# Patient Record
Sex: FEMALE | Race: WHITE | NOT HISPANIC OR LATINO | Employment: OTHER | URBAN - METROPOLITAN AREA
[De-identification: names, ages, dates, MRNs, and addresses within clinical notes are randomized per-mention and may not be internally consistent; named-entity substitution may affect disease eponyms.]

---

## 2017-02-21 ENCOUNTER — TELEPHONE (OUTPATIENT)
Dept: OBSTETRICS AND GYNECOLOGY | Age: 57
End: 2017-02-21

## 2017-02-21 NOTE — TELEPHONE ENCOUNTER
Pt states when she eats chocolate she gets knots under her skin, usually around her neck. Pt states about 10 days ago she ate a lot of chocolate and since then has had three pea sized knots near her vagina. Please advise.    Pt # [phone]

## 2017-02-22 NOTE — TELEPHONE ENCOUNTER
If she stops eating the chocolate these will go away, sounds like little sebaceous cysts.  Stop the chocolate, call if they haven't resolved in a month.

## 2017-11-15 DIAGNOSIS — Z78.0 MENOPAUSE: ICD-10-CM

## 2017-11-15 DIAGNOSIS — Z79.890 POST-MENOPAUSE ON HRT (HORMONE REPLACEMENT THERAPY): ICD-10-CM

## 2017-11-15 RX ORDER — ESTRADIOL 0.04 MG/D
FILM, EXTENDED RELEASE TRANSDERMAL
Qty: 8 PATCH | Refills: 3 | Status: SHIPPED | OUTPATIENT
Start: 2017-11-15 | End: 2017-12-05 | Stop reason: SDUPTHER

## 2017-12-04 ENCOUNTER — OFFICE VISIT (OUTPATIENT)
Dept: OBSTETRICS AND GYNECOLOGY | Age: 57
End: 2017-12-04

## 2017-12-04 VITALS
DIASTOLIC BLOOD PRESSURE: 94 MMHG | WEIGHT: 157 LBS | SYSTOLIC BLOOD PRESSURE: 140 MMHG | HEIGHT: 63 IN | BODY MASS INDEX: 27.82 KG/M2

## 2017-12-04 DIAGNOSIS — Z79.890 POST-MENOPAUSE ON HRT (HORMONE REPLACEMENT THERAPY): ICD-10-CM

## 2017-12-04 DIAGNOSIS — N39.46 MIXED STRESS AND URGE URINARY INCONTINENCE: ICD-10-CM

## 2017-12-04 DIAGNOSIS — Z01.419 WELL WOMAN EXAM WITH ROUTINE GYNECOLOGICAL EXAM: Primary | ICD-10-CM

## 2017-12-04 DIAGNOSIS — Z78.0 MENOPAUSE: ICD-10-CM

## 2017-12-04 LAB
BILIRUB BLD-MCNC: NEGATIVE MG/DL
GLUCOSE UR STRIP-MCNC: NEGATIVE MG/DL
KETONES UR QL: NEGATIVE
LEUKOCYTE EST, POC: NEGATIVE
NITRITE UR-MCNC: NEGATIVE MG/ML
PH UR: 6.5 [PH] (ref 5–8)
PROT UR STRIP-MCNC: NEGATIVE MG/DL
RBC # UR STRIP: ABNORMAL /UL
SP GR UR: 1.02 (ref 1–1.03)
UROBILINOGEN UR QL: NORMAL

## 2017-12-04 PROCEDURE — 81003 URINALYSIS AUTO W/O SCOPE: CPT | Performed by: OBSTETRICS & GYNECOLOGY

## 2017-12-04 PROCEDURE — 99396 PREV VISIT EST AGE 40-64: CPT | Performed by: OBSTETRICS & GYNECOLOGY

## 2017-12-04 NOTE — PROGRESS NOTES
Routine Annual Visit    2017    Patient: Marguerite Abarca          MR#:[id number]    Chief Complaint   Patient presents with   • Gynecologic Exam     Marguerite is here for her Annual Exam and Mammogram.  Her last pap smear was in  neg pap, neg HR-HPV.  Next pap .  She had a colonoscopy in , Dr. Booker.  Currently experiencing upper middle ab pain.  Also has UI.  No new surgeries.  Dr. Espinal discontinued relafen due to stomach pain.        57 y.o. female  who presents for annual exam.     HISTORY OF PRESENT ILLNESS:    GYN Complaints:  Urinary incontinence, both with cough and sneeze, lifting etc, but also spontaneously.     Other Complaints:  Stomach Pains, mid epigastric, better since stopping Relafen. Will see PCP tomorrow.    SCREENINGS:  =Family history of breast cancer: no       Perform regular self breast exam: yes - Several times per Month       Breast self-examination technique  reviewed and the patient encouraged to perform self breast exams monthly.   =Family history of ovarian cancer: no  =Family history of uterine cancer: no  =Family History of colon cancer: yes - Father, sister had colon polyps.    Mammogram: done today.  Colonoscopy: up to date.   Dr. Mcneal,   2016  Dr. Booker= No polyps.   DEXA: up to date. Followed by Dr Espinal.    LIFESTYLE:  =Diet: Not eating healthy.   = We discussed healthy lifestyle modifications.      =Exercise:  no. Active and lifts at work.   =I recommended 30 minutes of aerobic exercise 5 times a week.     =Calcium  no.But drinks 2 glasses of milk daily.  =Vitamin D:  yes -  50,000 units q week..   = We discussed calcium intake needs to prevent osteoporosis. 50,000 units q week.    GYN HISTORY:  1.  Menstrual Hx:, WILLIAM,BSO                 HRT:  yes - .0375 estradiol patch twice weekly.         Current contraception: post menopausal status    2.  History of abnormal Pap smear:  neg pap, neg HR-HPV. Next Pap .       Pap smear Hx: Never had an  "abnormal Pap smear.     NEW PAST MEDICAL HISTORY:    3.  New Medical Hx:  None.    4.  New Surgical Hx:  None.    5.  New Family Medical Hx:  None.    Review of Systems   Constitutional: Positive for fatigue.   HENT: Negative.    Eyes: Negative.    Respiratory: Positive for cough.    Cardiovascular:        Legs and feet toes hurt   Gastrointestinal:        HEARTBURN   Endocrine:        HAIR LOSS   Genitourinary: Positive for frequency.        URINARY INCONTINENCE     Musculoskeletal:        JOINT PAIN   Skin: Negative.    Allergic/Immunologic: Negative.    Neurological: Negative.    Hematological: Negative.    Psychiatric/Behavioral: Negative.        Objective     /94  Ht 160 cm (63\")  Wt 71.2 kg (157 lb)  Breastfeeding? No  BMI 27.81 kg/m2    PHYSICAL EXAM    Constitutional: Well-developed, well-nourished, overweight  female, in no distress, alert and well oriented ×3.    Skin is warm, dry, without rash.       Neck appears normal, trachea in the midline.  Thyroid exam normal.  No carotid bruits bilaterally.         No cervical lymphadenopathy.    Lungs clear to auscultation.  Chest wall appears normal.    Heart with regular rhythm without murmur or gallop.    Breasts:         Right breast nontender, without dominant mass.  No nipple discharge.            No superficial skin changes.  No axillary adenopathy.        Left breast nontender, without dominant mass.  No nipple discharge.            No superficial skin changes.  No axillary adenopathy.      Abdomen is flat, soft and nontender.No palpable mass.           No hepatosplenomegaly.  Flanks are negative.          Bowel sounds normal.  No abdominal bruit.          No inguinal lymphadenopathy bilaterally.     Pelvic exam :  Vulva normal.           External genitalia normal.  BUS negative.             Introitus normal.  Vaginal mucosa normal.  Well estrogenized.  Mild cystocele.           Vaginal cuff normal, uterus absent.  No Pap smear.          " Bimanual exam normal, no tenderness, no mass.  Adnexa absent.          Rectovaginal exam negative .      Musc /Skel: Lower extremities without edema.     Neuro: Coordination is grossly normal.  Gait is normal.    Psych: Mood and affect is normal.  Behavior normal.  Thought content normal.            Judgment normal.       =All questions were answered.      Assessment/Plan   Marguerite was seen today for gynecologic exam.    Diagnoses and all orders for this visit:    Well woman exam with routine gynecological exam  -     POC Urinalysis Dipstick, Automated    Menopause  -     estradiol (VIVELLE-DOT) 0.0375 MG/24HR; Place 1 patch on the skin 2 (Two) Times a Week.    Post-menopause on HRT (hormone replacement therapy)  -     estradiol (VIVELLE-DOT) 0.0375 MG/24HR; Place 1 patch on the skin 2 (Two) Times a Week.    Mixed stress and urge urinary incontinence  Comments:  Mostly stress urinary incontinence, also some degree of urgency incontinence.  Given Myrbetriq as a trial.  May need to see urogynecology.  Orders:  -     Mirabegron ER (MYRBETRIQ) 50 MG tablet sustained-release 24 hour 24 hr tablet; Take 50 mg by mouth Daily.      COMMENTS: Trial of Myrbetriq to see how much improvement that gives her bladder incontinence.  We'll probably need a TVT consult in the future.    Malik Lopez MD  12/5/2017

## 2017-12-05 PROBLEM — N39.46 MIXED STRESS AND URGE URINARY INCONTINENCE: Status: ACTIVE | Noted: 2017-12-05

## 2017-12-05 RX ORDER — ESTRADIOL 0.04 MG/D
1 FILM, EXTENDED RELEASE TRANSDERMAL 2 TIMES WEEKLY
Qty: 8 PATCH | Refills: 12 | Status: SHIPPED | OUTPATIENT
Start: 2017-12-07 | End: 2018-12-13 | Stop reason: SDUPTHER

## 2017-12-12 ENCOUNTER — TELEPHONE (OUTPATIENT)
Dept: OBSTETRICS AND GYNECOLOGY | Age: 57
End: 2017-12-12

## 2017-12-15 ENCOUNTER — OFFICE VISIT (OUTPATIENT)
Dept: GASTROENTEROLOGY | Facility: CLINIC | Age: 57
End: 2017-12-15

## 2017-12-15 VITALS
WEIGHT: 159.4 LBS | DIASTOLIC BLOOD PRESSURE: 98 MMHG | TEMPERATURE: 98.1 F | SYSTOLIC BLOOD PRESSURE: 142 MMHG | HEIGHT: 63 IN | BODY MASS INDEX: 28.24 KG/M2

## 2017-12-15 DIAGNOSIS — R10.10 PAIN OF UPPER ABDOMEN: Primary | ICD-10-CM

## 2017-12-15 PROCEDURE — 99204 OFFICE O/P NEW MOD 45 MIN: CPT | Performed by: INTERNAL MEDICINE

## 2017-12-15 NOTE — PROGRESS NOTES
Chief Complaint   Patient presents with   • Abdominal Pain       Marguerite Abarca is a 57 y.o. female who presents with Epigastric pain, on NSAIDs, 6 weeks    HPI Comments: Colonoscopy last year showed diverticulosis otherwise negative    Abdominal Pain   This is a new problem. The current episode started more than 1 month ago. The onset quality is gradual. The problem occurs daily. The problem has been waxing and waning. The pain is located in the RUQ, epigastric region and LUQ. The pain is at a severity of 5/10. The pain is moderate. The quality of the pain is colicky, burning and aching. The abdominal pain does not radiate. Associated symptoms include belching. Pertinent negatives include no anorexia, constipation, diarrhea, fever, flatus, frequency, headaches, hematochezia, melena, myalgias, nausea, vomiting or weight loss. The pain is aggravated by certain positions and movement. The pain is relieved by being still and certain positions. Treatments tried: She has stopped her NSAIDs. The treatment provided mild relief. There is no history of abdominal surgery, colon cancer, Crohn's disease, gallstones, GERD, irritable bowel syndrome, pancreatitis, PUD or ulcerative colitis.       Past Medical History:   Diagnosis Date   • Anal fissure    • Chronic pelvic pain in female 1999    Resolved with WILLIAM, BSO.   • Diverticulosis    • GERD (gastroesophageal reflux disease)    • Goiter    • Menopause    • Osteoporosis    • Postmenopausal HRT (hormone replacement therapy)    • Rheumatoid arthritis     Dr Chava Espinal; Methotrexate,  Stopped relafen 12/1/2017.   • Uterine fibroid 1999    Resolved with WILLIAM, BSO.   • Vasovagal syncope    • Vitamin D deficiency        Past Surgical History:   Procedure Laterality Date   • COLONOSCOPY N/A 8/25/2016    Diverticulosis in the sigmoid colon, The examination was otherwise normal on direct and retoflexion views   • CYST REMOVAL      ON WRIST-BENIGN   • DIAGNOSTIC LAPAROSCOPY  1983     Exploratory Laparoscopy with myomectomy (twice) and xautery of endometriosis. The ovaries were preserved.   • EXCISION TUMOR NECK / THORAX       Removal of Tumor from her back that was benign, Lumbo-Sacral area. Benign.   • MYOMECTOMY      Exploratory Laparoscopy with myomectomy (twice) and xautery of endometriosis. The ovaries were preserved.   • TOTAL ABDOMINAL HYSTERECTOMY Bilateral     Total Abdominal Hysterectomy with Removal of Both Ovaries. for cronic pelvic pain and adhesions.         Current Outpatient Prescriptions:   •  estradiol (VIVELLE-DOT) 0.0375 MG/24HR, Place 1 patch on the skin 2 (Two) Times a Week., Disp: 8 patch, Rfl: 12  •  folic acid (FOLVITE) 1 MG tablet, Take 1 mg by mouth daily., Disp: , Rfl:   •  methotrexate 2.5 MG tablet, Take 2.5 mg by mouth. 6 tablets taken all at once one day a week, Disp: , Rfl:   •  Mirabegron ER (MYRBETRIQ) 50 MG tablet sustained-release 24 hour 24 hr tablet, Take 50 mg by mouth Daily., Disp: 7 tablet, Rfl: 1  •  pantoprazole (PROTONIX) 40 MG EC tablet, Take 40 mg by mouth daily., Disp: , Rfl:   •  vitamin D (ERGOCALCIFEROL) 38048 UNITS capsule capsule, Take 50,000 Units by mouth 1 (one) time per week., Disp: , Rfl:     No Known Allergies    Social History     Social History   • Marital status:      Spouse name: LETITIA   • Number of children: 0   • Years of education: N/A     Occupational History   • Not on file.     Social History Main Topics   • Smoking status: Never Smoker   • Smokeless tobacco: Never Used   • Alcohol use 0.6 oz/week     1 Shots of liquor per week      Comment: rarely   • Drug use: No   • Sexual activity: Defer      Comment: spouse = LETITIA      Other Topics Concern   • Not on file     Social History Narrative       Family History   Problem Relation Age of Onset   • Valvular heart disease Father       of CHF.   • Colon cancer Father    • Colon polyps Sister    • COPD Mother    • No Known Problems Brother    • No Known Problems  Maternal Grandmother    • No Known Problems Maternal Grandfather    • No Known Problems Paternal Grandmother    • No Known Problems Paternal Grandfather    • No Known Problems Maternal Aunt    • No Known Problems Paternal Aunt    • Kidney disease Maternal Uncle    • BRCA 1/2 Neg Hx    • Breast cancer Neg Hx    • Endometrial cancer Neg Hx    • Ovarian cancer Neg Hx        Review of Systems   Constitutional: Negative for fever and weight loss.   Gastrointestinal: Positive for abdominal pain. Negative for anorexia, constipation, diarrhea, flatus, hematochezia, melena, nausea and vomiting.   Genitourinary: Negative for frequency.   Musculoskeletal: Negative for myalgias.   Neurological: Negative for headaches.   All other systems reviewed and are negative.      There were no vitals filed for this visit.    Physical Exam   Constitutional: She is oriented to person, place, and time. She appears well-developed and well-nourished.   HENT:   Head: Normocephalic and atraumatic.   Eyes: Pupils are equal, round, and reactive to light.   Cardiovascular: Normal rate, regular rhythm and normal heart sounds.    Pulmonary/Chest: Effort normal and breath sounds normal.   Abdominal: Soft. Bowel sounds are normal. She exhibits no shifting dullness, no distension, no pulsatile liver, no fluid wave, no abdominal bruit, no ascites, no pulsatile midline mass and no mass. There is no hepatosplenomegaly. There is tenderness. There is no rigidity and no guarding. No hernia.   She is tender in the epigastric region   Musculoskeletal: Normal range of motion.   Neurological: She is alert and oriented to person, place, and time.   Skin: Skin is warm and dry.   Psychiatric: She has a normal mood and affect. Her behavior is normal. Thought content normal.   Nursing note and vitals reviewed.      Problem list    Epigastric pain  Left upper quadrant pain  NSAID use      Assessment/Plan    I'm going to perform EGD to look for peptic ulcer disease, if  that is negative we'll move to CAT scan of the abdomen    If those tests are unrevealing we may perform a gallbladder workup

## 2017-12-30 ENCOUNTER — ANESTHESIA EVENT (OUTPATIENT)
Dept: GASTROENTEROLOGY | Facility: HOSPITAL | Age: 57
End: 2017-12-30

## 2017-12-30 ENCOUNTER — HOSPITAL ENCOUNTER (OUTPATIENT)
Facility: HOSPITAL | Age: 57
Setting detail: HOSPITAL OUTPATIENT SURGERY
Discharge: HOME | End: 2017-12-30
Attending: INTERNAL MEDICINE | Admitting: INTERNAL MEDICINE

## 2017-12-30 ENCOUNTER — ANESTHESIA (OUTPATIENT)
Dept: GASTROENTEROLOGY | Facility: HOSPITAL | Age: 57
End: 2017-12-30

## 2017-12-30 VITALS
OXYGEN SATURATION: 99 % | TEMPERATURE: 98.4 F | BODY MASS INDEX: 27.29 KG/M2 | HEIGHT: 63 IN | WEIGHT: 154 LBS | RESPIRATION RATE: 16 BRPM | SYSTOLIC BLOOD PRESSURE: 158 MMHG | DIASTOLIC BLOOD PRESSURE: 106 MMHG | HEART RATE: 66 BPM

## 2017-12-30 DIAGNOSIS — R10.10 PAIN OF UPPER ABDOMEN: ICD-10-CM

## 2017-12-30 PROCEDURE — 88312 SPECIAL STAINS GROUP 1: CPT | Performed by: INTERNAL MEDICINE

## 2017-12-30 PROCEDURE — 43239 EGD BIOPSY SINGLE/MULTIPLE: CPT | Performed by: INTERNAL MEDICINE

## 2017-12-30 PROCEDURE — 88305 TISSUE EXAM BY PATHOLOGIST: CPT | Performed by: INTERNAL MEDICINE

## 2017-12-30 PROCEDURE — 25010000002 PROPOFOL 10 MG/ML EMULSION: Performed by: ANESTHESIOLOGY

## 2017-12-30 RX ORDER — PROPOFOL 10 MG/ML
VIAL (ML) INTRAVENOUS CONTINUOUS PRN
Status: DISCONTINUED | OUTPATIENT
Start: 2017-12-30 | End: 2017-12-30 | Stop reason: SURG

## 2017-12-30 RX ORDER — SUCRALFATE 1 G/1
1 TABLET ORAL 2 TIMES DAILY
Qty: 60 TABLET | Refills: 0 | Status: SHIPPED | OUTPATIENT
Start: 2017-12-30 | End: 2018-02-01

## 2017-12-30 RX ORDER — PROPOFOL 10 MG/ML
VIAL (ML) INTRAVENOUS
Status: COMPLETED
Start: 2017-12-30 | End: 2017-12-30

## 2017-12-30 RX ORDER — LIDOCAINE HYDROCHLORIDE 20 MG/ML
INJECTION, SOLUTION INFILTRATION; PERINEURAL AS NEEDED
Status: DISCONTINUED | OUTPATIENT
Start: 2017-12-30 | End: 2017-12-30 | Stop reason: SURG

## 2017-12-30 RX ORDER — SODIUM CHLORIDE, SODIUM LACTATE, POTASSIUM CHLORIDE, CALCIUM CHLORIDE 600; 310; 30; 20 MG/100ML; MG/100ML; MG/100ML; MG/100ML
1000 INJECTION, SOLUTION INTRAVENOUS CONTINUOUS PRN
Status: DISCONTINUED | OUTPATIENT
Start: 2017-12-30 | End: 2017-12-30 | Stop reason: HOSPADM

## 2017-12-30 RX ORDER — PANTOPRAZOLE SODIUM 40 MG/1
40 TABLET, DELAYED RELEASE ORAL
Qty: 60 TABLET | Refills: 2 | Status: SHIPPED | OUTPATIENT
Start: 2017-12-30

## 2017-12-30 RX ADMIN — LIDOCAINE HYDROCHLORIDE 60 MG: 20 INJECTION, SOLUTION INFILTRATION; PERINEURAL at 10:54

## 2017-12-30 RX ADMIN — SODIUM CHLORIDE, POTASSIUM CHLORIDE, SODIUM LACTATE AND CALCIUM CHLORIDE 1000 ML: 600; 310; 30; 20 INJECTION, SOLUTION INTRAVENOUS at 09:33

## 2017-12-30 RX ADMIN — PROPOFOL 150 MCG/KG/MIN: 10 INJECTION, EMULSION INTRAVENOUS at 10:54

## 2017-12-30 RX ADMIN — SODIUM CHLORIDE, POTASSIUM CHLORIDE, SODIUM LACTATE AND CALCIUM CHLORIDE: 600; 310; 30; 20 INJECTION, SOLUTION INTRAVENOUS at 10:54

## 2017-12-30 NOTE — ANESTHESIA PREPROCEDURE EVALUATION
Anesthesia Evaluation     Patient summary reviewed and Nursing notes reviewed   no history of anesthetic complications:  NPO Solid Status: > 6 hours       Airway   Mallampati: II  TM distance: >3 FB  Neck ROM: full  no difficulty expected  Dental - normal exam     Pulmonary - normal exam   Cardiovascular - normal exam    (-) angina      Neuro/Psych  (+) syncope,     GI/Hepatic/Renal/Endo    (+)  GERD,     Musculoskeletal     Abdominal    Substance History      OB/GYN          Other                                                Anesthesia Plan    ASA 2     MAC     Anesthetic plan and risks discussed with patient.

## 2017-12-30 NOTE — ANESTHESIA POSTPROCEDURE EVALUATION
Patient: Marguerite Abarca    Procedure Summary     Date Anesthesia Start Anesthesia Stop Room / Location    12/30/17 1054 1109  [facility] /  [facility]       Procedure Diagnosis Surgeon Provider    ESOPHAGOGASTRODUODENOSCOPY with biopsy (N/A Esophagus) Pain of upper abdomen  (Pain of upper abdomen [R10.10]) MD Davey Stoddard MD          Anesthesia Type: MAC  Last vitals  BP   137/65 (12/30/17 1107)   Temp   36.9 °C (98.4 °F) (12/30/17 1107)   Pulse   67 (12/30/17 1107)   Resp   16 (12/30/17 1107)     SpO2   96 % (12/30/17 1107)     Post Anesthesia Care and Evaluation    Patient location during evaluation: PHASE II  Anesthetic complications: No anesthetic complications

## 2018-01-02 LAB
CYTO UR: NORMAL
LAB AP CASE REPORT: NORMAL
Lab: NORMAL
PATH REPORT.FINAL DX SPEC: NORMAL
PATH REPORT.GROSS SPEC: NORMAL

## 2018-01-03 ENCOUNTER — TELEPHONE (OUTPATIENT)
Dept: GASTROENTEROLOGY | Facility: CLINIC | Age: 58
End: 2018-01-03

## 2018-01-03 DIAGNOSIS — K29.00 ACUTE GASTRITIS WITHOUT HEMORRHAGE, UNSPECIFIED GASTRITIS TYPE: ICD-10-CM

## 2018-01-03 DIAGNOSIS — K21.9 GASTROESOPHAGEAL REFLUX DISEASE WITHOUT ESOPHAGITIS: Primary | ICD-10-CM

## 2018-01-03 NOTE — TELEPHONE ENCOUNTER
----- Message from Sherry Collins sent at 1/3/2018 11:04 AM EST -----  Regarding: PT CALLED - U/S ORDER  Contact: [phone]  PT STATED DR SILVEIRA WANT HER TO HAVE AN U/S and HIDA scan. PLEASE CHECK FOR ORDER AND CALL PT.  Patient called advised Dr. Rodriguez would like for her to have a Liver scan and a HIDA scan done. Advised Central Scheduling at the hospital will be calling her for an appointment time.

## 2018-01-12 ENCOUNTER — HOSPITAL ENCOUNTER (OUTPATIENT)
Dept: NUCLEAR MEDICINE | Facility: HOSPITAL | Age: 58
Discharge: HOME | End: 2018-01-12
Attending: INTERNAL MEDICINE

## 2018-01-12 ENCOUNTER — HOSPITAL ENCOUNTER (OUTPATIENT)
Dept: ULTRASOUND IMAGING | Facility: HOSPITAL | Age: 58
Discharge: HOME | End: 2018-01-12
Attending: INTERNAL MEDICINE | Admitting: INTERNAL MEDICINE

## 2018-01-12 DIAGNOSIS — K29.00 ACUTE GASTRITIS WITHOUT HEMORRHAGE, UNSPECIFIED GASTRITIS TYPE: ICD-10-CM

## 2018-01-12 DIAGNOSIS — K21.9 GASTROESOPHAGEAL REFLUX DISEASE WITHOUT ESOPHAGITIS: ICD-10-CM

## 2018-01-12 PROCEDURE — 76705 ECHO EXAM OF ABDOMEN: CPT

## 2018-01-12 PROCEDURE — A9537 TC99M MEBROFENIN: HCPCS | Performed by: INTERNAL MEDICINE

## 2018-01-12 PROCEDURE — 78227 HEPATOBIL SYST IMAGE W/DRUG: CPT

## 2018-01-12 PROCEDURE — 0 TECHNETIUM TC 99M MEBROFENIN KIT: Performed by: INTERNAL MEDICINE

## 2018-01-12 PROCEDURE — 25010000002 SINCALIDE PER 5 MCG: Performed by: INTERNAL MEDICINE

## 2018-01-12 RX ORDER — KIT FOR THE PREPARATION OF TECHNETIUM TC 99M MEBROFENIN 45 MG/10ML
1 INJECTION, POWDER, LYOPHILIZED, FOR SOLUTION INTRAVENOUS
Status: COMPLETED | OUTPATIENT
Start: 2018-01-12 | End: 2018-01-12

## 2018-01-12 RX ADMIN — SINCALIDE 1.4 MCG: 5 INJECTION, POWDER, LYOPHILIZED, FOR SOLUTION INTRAVENOUS at 08:30

## 2018-01-12 RX ADMIN — MEBROFENIN 1 DOSE: 45 INJECTION, POWDER, LYOPHILIZED, FOR SOLUTION INTRAVENOUS at 07:30

## 2018-01-15 ENCOUNTER — TELEPHONE (OUTPATIENT)
Dept: GASTROENTEROLOGY | Facility: CLINIC | Age: 58
End: 2018-01-15

## 2018-01-15 NOTE — TELEPHONE ENCOUNTER
----- Message from Ignacio Rodriguez MD sent at 1/15/2018  9:24 AM EST -----  Has gallbladder dyskinesia or chronic cholecystitis based on HIDA scan.  She will need to have her gallbladder out.  Would she like a referral to general surgery?  If so referred to KRISTYN Pardo.  If she wants to come talk to me about it in the office first I'm happy to do that this Friday afternoon anytime

## 2018-01-15 NOTE — PROGRESS NOTES
Has gallbladder dyskinesia or chronic cholecystitis based on HIDA scan.  She will need to have her gallbladder out.  Would she like a referral to general surgery?  If so referred to KRISTYN Pardo.  If she wants to come talk to me about it in the office first I'm happy to do that this Friday afternoon anytime

## 2018-01-15 NOTE — TELEPHONE ENCOUNTER
----- Message from Ignacio Rodriguez MD sent at 1/2/2018 12:45 PM EST -----  Pathology is benign, schedule a right upper quadrant ultrasound and HIDA scan with [facility]

## 2018-01-15 NOTE — TELEPHONE ENCOUNTER
Pt called back after Cuca had made appt for her to see Dr Rodriguez and called her with appt info. Apparently her sister has been a RN for 30 years and knows Dr Miranda well. She recommended Dr Miranda to pt. Pt called back and asked if referral could be sent to Dr Miranda instead of Dr Rodriguez since her sister knows Dr Miranda. Cuca stated she would cancel appt with Dr Rodriguez and place referral to Dr Miranda. Referral placed.

## 2018-01-15 NOTE — TELEPHONE ENCOUNTER
Called pt and advised that per Dr Rodriguez: the path results from her upper scope were all negative or normal. Advised that the HIDA scan showed that her gallbladder is not functioning as well as it should: her ejection fraction is only 13% when it should be 35% or greater. Advised that MD recommends to have her gallbladder removed. Advised that we can refer her to a surgeon, Dr Rupesh Rodriguez, if she would like or if she would like to come talk to Dr Rodriguez first, he can see her this Friday. Pt verb understanding and states OK to make referral to Dr Rodriguez.   Note given to scheduling to make referral.

## 2018-02-01 ENCOUNTER — OFFICE VISIT (OUTPATIENT)
Dept: SURGERY | Facility: CLINIC | Age: 58
End: 2018-02-01

## 2018-02-01 VITALS — HEART RATE: 78 BPM | BODY MASS INDEX: 27.46 KG/M2 | OXYGEN SATURATION: 98 % | WEIGHT: 155 LBS | HEIGHT: 63 IN

## 2018-02-01 DIAGNOSIS — K82.8 BILIARY DYSKINESIA: Primary | ICD-10-CM

## 2018-02-01 PROCEDURE — 99243 OFF/OP CNSLTJ NEW/EST LOW 30: CPT | Performed by: SURGERY

## 2018-02-03 NOTE — PROGRESS NOTES
SUMMARY (A/P):    57-year-old lady with typical symptoms of biliary colic, family history of gallbladder disease, and abnormal HIDA scan.  Understanding her options she wishes to proceed with laparoscopic cholecystectomy.  She understands the rationale for the procedure, the nature of the procedure, and the risks including but not limited to bleeding, infection, conversion to open procedure, postoperative bile leak, and the bowel function changes which can accompany cholecystectomy.      CC:  Referred for consultation regarding abdominal pain by Cristiane LOUIE    HPI:  57-year-old lady presents with epigastric and right upper quadrant abdominal pain radiating to the flank.  Intermittent and moderately severe in nature.  Duration of symptoms is approximately 2 years and they seem to be worsening.  Associated with nausea.    PHYSICAL EXAM:   Constitutional: Well-developed well-nourished, no acute distress   Heart rate 78   Weight (pounds) 155   BMI 27.5   Height (inches) 63  Eyes: Conjunctiva normal, sclera nonicteric  ENMT: Hearing grossly normal, oral mucosa moist  Neck: Supple, no palpable mass, normal thyroid, trachea midline  Respiratory: Clear to auscultation, normal inspiratory effort  Cardiovascular: Regular rate, no murmur, no carotid bruit, no peripheral edema, no jugular venous distention  Gastrointestinal: Soft, nontender, no palpable mass, no hepatosplenomegaly, negative for hernia, bowel sounds normal  Lymphatics (palpable nodes):  cervical-negative, axillary-negative  Skin:  Warm, dry, no rash on visualized skin surfaces  Musculoskeletal: Symmetric strength, normal gait  Psychiatric: Alert and oriented ×3, normal affect     ALLERGIES: reviewed, in Epic    MEDICATIONS: reviewed, in Epic    PMH:    Gastroesophageal reflux disease  Rheumatoid arthritis  Osteoporosis    PSH:    Diagnostic laparoscopy 1983  Myomectomy 1995  Colonoscopy August 2016-diverticulosis  Total abdominal hysterectomy  1999  Hemorrhoidectomy    FAMILY HISTORY:    Gallbladder disease in her sister    SOCIAL HISTORY:   Denies tobacco use  Occasional alcohol use    ROS:  No chest pain or shortness of air.  Negative for unexpected weight loss.  All other systems reviewed and negative other than presenting complaints.    RADIOLOGY/ENDOSCOPY:    -HIDA scan with Kinevac stimulation 1/3/2018 13% ejection fraction  -Ultrasound gallbladder 1/3/2018 unremarkable  EGD 12/30/2017 mild gastritis, otherwise unremarkable    ARIE LARRY M.D.

## 2018-02-09 ENCOUNTER — APPOINTMENT (OUTPATIENT)
Dept: PREADMISSION TESTING | Facility: HOSPITAL | Age: 58
End: 2018-02-09

## 2018-02-09 VITALS
HEART RATE: 78 BPM | WEIGHT: 159.6 LBS | OXYGEN SATURATION: 100 % | TEMPERATURE: 98 F | SYSTOLIC BLOOD PRESSURE: 160 MMHG | HEIGHT: 63 IN | DIASTOLIC BLOOD PRESSURE: 89 MMHG | RESPIRATION RATE: 16 BRPM | BODY MASS INDEX: 28.28 KG/M2

## 2018-02-09 LAB
ANION GAP SERPL CALCULATED.3IONS-SCNC: 8.7 MMOL/L
BASOPHILS # BLD AUTO: 0.04 10*3/MM3 (ref 0–0.2)
BASOPHILS NFR BLD AUTO: 0.8 % (ref 0–1.5)
BUN BLD-MCNC: 13 MG/DL (ref 6–20)
BUN/CREAT SERPL: 18.3 (ref 7–25)
CALCIUM SPEC-SCNC: 9.1 MG/DL (ref 8.6–10.5)
CHLORIDE SERPL-SCNC: 105 MMOL/L (ref 98–107)
CO2 SERPL-SCNC: 28.3 MMOL/L (ref 22–29)
CREAT BLD-MCNC: 0.71 MG/DL (ref 0.57–1)
DEPRECATED RDW RBC AUTO: 48.7 FL (ref 37–54)
EOSINOPHIL # BLD AUTO: 0.18 10*3/MM3 (ref 0–0.7)
EOSINOPHIL NFR BLD AUTO: 3.8 % (ref 0.3–6.2)
ERYTHROCYTE [DISTWIDTH] IN BLOOD BY AUTOMATED COUNT: 13.4 % (ref 11.7–13)
GFR SERPL CREATININE-BSD FRML MDRD: 84 ML/MIN/1.73
GLUCOSE BLD-MCNC: 88 MG/DL (ref 65–99)
HCT VFR BLD AUTO: 40.2 % (ref 35.6–45.5)
HGB BLD-MCNC: 12.4 G/DL (ref 11.9–15.5)
IMM GRANULOCYTES # BLD: 0 10*3/MM3 (ref 0–0.03)
IMM GRANULOCYTES NFR BLD: 0 % (ref 0–0.5)
LYMPHOCYTES # BLD AUTO: 1.17 10*3/MM3 (ref 0.9–4.8)
LYMPHOCYTES NFR BLD AUTO: 24.7 % (ref 19.6–45.3)
MCH RBC QN AUTO: 30.8 PG (ref 26.9–32)
MCHC RBC AUTO-ENTMCNC: 30.8 G/DL (ref 32.4–36.3)
MCV RBC AUTO: 99.8 FL (ref 80.5–98.2)
MONOCYTES # BLD AUTO: 0.45 10*3/MM3 (ref 0.2–1.2)
MONOCYTES NFR BLD AUTO: 9.5 % (ref 5–12)
NEUTROPHILS # BLD AUTO: 2.9 10*3/MM3 (ref 1.9–8.1)
NEUTROPHILS NFR BLD AUTO: 61.2 % (ref 42.7–76)
PLATELET # BLD AUTO: 237 10*3/MM3 (ref 140–500)
PMV BLD AUTO: 9.5 FL (ref 6–12)
POTASSIUM BLD-SCNC: 4.1 MMOL/L (ref 3.5–5.2)
RBC # BLD AUTO: 4.03 10*6/MM3 (ref 3.9–5.2)
SODIUM BLD-SCNC: 142 MMOL/L (ref 136–145)
WBC NRBC COR # BLD: 4.74 10*3/MM3 (ref 4.5–10.7)

## 2018-02-09 PROCEDURE — 36415 COLL VENOUS BLD VENIPUNCTURE: CPT

## 2018-02-09 PROCEDURE — 85025 COMPLETE CBC W/AUTO DIFF WBC: CPT | Performed by: SURGERY

## 2018-02-09 PROCEDURE — 80048 BASIC METABOLIC PNL TOTAL CA: CPT | Performed by: SURGERY

## 2018-02-09 NOTE — DISCHARGE INSTRUCTIONS
Take the following medications the morning of surgery with a small sip of water: PROTONIX    ARRIVAL TIME  08:30        General Instructions:  • Do not eat solid food after midnight the night before surgery.  • You may drink clear liquids day of surgery but must stop at least one hour before your hospital arrival time.  • It is beneficial for you to have a clear drink that contains carbohydrates the day of surgery.  We suggest a 12 to 20 ounce bottle of Gatorade or Powerade for non-diabetic patients or a 12 to 20 ounce bottle of G2 or Powerade Zero for diabetic patients. (Pediatric patients, are not advised to drink a 12 to 20 ounce carbohydrate drink)    Clear liquids are liquids you can see through.  Nothing red in color.     Plain water                               Sports drinks  Sodas                                   Gelatin (Jell-O)  Fruit juices without pulp such as white grape juice and apple juice  Popsicles that contain no fruit or yogurt  Tea or coffee (no cream or milk added)  Gatorade / Powerade  G2 / Powerade Zero    .   • Bring any papers given to you in the doctor’s office.  • Wear clean comfortable clothes and socks.  • Do not wear contact lenses or make-up.  Bring a case for your glasses.   • Remove all piercings.  Leave jewelry and any other valuables at home.  • The Pre-Admission Testing nurse will instruct you to bring medications if unable to obtain an accurate list in Pre-Admission Testing.            Preventing a Surgical Site Infection:  • For 2 to 3 days before surgery, avoid shaving with a razor because the razor can irritate skin and make it easier to develop an infection.  • The night prior to surgery sleep in a clean bed with clean clothing.  Do not allow pets to sleep with you.  • Shower on the morning of surgery using a fresh bar of anti-bacterial soap (such as Dial) and clean washcloth.  Dry with a clean towel and dress in clean clothing.  • Ask your surgeon if you will be receiving  antibiotics prior to surgery.  • Make sure you, your family, and all healthcare providers clean their hands with soap and water or an alcohol based hand  before caring for you or your wound.    Day of surgery:  Upon arrival, a Pre-op nurse and Anesthesiologist will review your health history, obtain vital signs, and answer questions you may have.  The only belongings needed at this time will be your home medications and if applicable your C-PAP/BI-PAP machine.  If you are staying overnight your family can leave the rest of your belongings in the car and bring them to your room later.  A Pre-op nurse will start an IV and you may receive medication in preparation for surgery, including something to help you relax.  Your family will be able to see you in the Pre-op area.  While you are in surgery your family should notify the waiting room  if they leave the waiting room area and provide a contact phone number.    Please be aware that surgery does come with discomfort.  We want to make every effort to control your discomfort so please discuss any uncontrolled symptoms with your nurse.   Your doctor will most likely have prescribed pain medications.      If you are going home after surgery you will receive individualized written care instructions before being discharged.  A responsible adult must drive you to and from the hospital on the day of your surgery and stay with you for 24 hours.    If you are staying overnight following surgery, you will be transported to your hospital room following the recovery period.  [facility] has all private rooms.    If you have any questions please call Pre-Admission Testing at [phone].  Deductibles and co-payments are collected on the day of service. Please be prepared to pay the required co-pay, deductible or deposit on the day of service as defined by your plan.

## 2018-02-16 ENCOUNTER — HOSPITAL ENCOUNTER (OUTPATIENT)
Facility: HOSPITAL | Age: 58
Setting detail: HOSPITAL OUTPATIENT SURGERY
Discharge: HOME | End: 2018-02-16
Attending: SURGERY | Admitting: SURGERY

## 2018-02-16 ENCOUNTER — ANESTHESIA (OUTPATIENT)
Dept: PERIOP | Facility: HOSPITAL | Age: 58
End: 2018-02-16

## 2018-02-16 ENCOUNTER — ANESTHESIA EVENT (OUTPATIENT)
Dept: PERIOP | Facility: HOSPITAL | Age: 58
End: 2018-02-16

## 2018-02-16 VITALS
SYSTOLIC BLOOD PRESSURE: 132 MMHG | HEART RATE: 60 BPM | RESPIRATION RATE: 16 BRPM | OXYGEN SATURATION: 98 % | TEMPERATURE: 98.8 F | DIASTOLIC BLOOD PRESSURE: 84 MMHG

## 2018-02-16 DIAGNOSIS — K82.8 BILIARY DYSKINESIA: ICD-10-CM

## 2018-02-16 PROCEDURE — 25010000002 HYDROMORPHONE PER 4 MG: Performed by: ANESTHESIOLOGY

## 2018-02-16 PROCEDURE — 47562 LAPAROSCOPIC CHOLECYSTECTOMY: CPT | Performed by: PHYSICIAN ASSISTANT

## 2018-02-16 PROCEDURE — 88304 TISSUE EXAM BY PATHOLOGIST: CPT | Performed by: SURGERY

## 2018-02-16 PROCEDURE — 25010000002 KETOROLAC TROMETHAMINE PER 15 MG: Performed by: NURSE ANESTHETIST, CERTIFIED REGISTERED

## 2018-02-16 PROCEDURE — 25010000002 FENTANYL CITRATE (PF) 100 MCG/2ML SOLUTION: Performed by: ANESTHESIOLOGY

## 2018-02-16 PROCEDURE — 25010000002 PROPOFOL 10 MG/ML EMULSION: Performed by: NURSE ANESTHETIST, CERTIFIED REGISTERED

## 2018-02-16 PROCEDURE — 0 IOTHALAMATE 60 % SOLUTION: Performed by: SURGERY

## 2018-02-16 PROCEDURE — 25010000002 DEXAMETHASONE PER 1 MG: Performed by: NURSE ANESTHETIST, CERTIFIED REGISTERED

## 2018-02-16 PROCEDURE — 25010000002 PROMETHAZINE PER 50 MG: Performed by: ANESTHESIOLOGY

## 2018-02-16 PROCEDURE — 25010000002 MIDAZOLAM PER 1 MG: Performed by: ANESTHESIOLOGY

## 2018-02-16 PROCEDURE — 25010000002 FENTANYL CITRATE (PF) 100 MCG/2ML SOLUTION: Performed by: NURSE ANESTHETIST, CERTIFIED REGISTERED

## 2018-02-16 PROCEDURE — 47562 LAPAROSCOPIC CHOLECYSTECTOMY: CPT | Performed by: SURGERY

## 2018-02-16 PROCEDURE — 25010000002 ONDANSETRON PER 1 MG: Performed by: NURSE ANESTHETIST, CERTIFIED REGISTERED

## 2018-02-16 RX ORDER — ONDANSETRON 2 MG/ML
INJECTION INTRAMUSCULAR; INTRAVENOUS AS NEEDED
Status: DISCONTINUED | OUTPATIENT
Start: 2018-02-16 | End: 2018-02-16 | Stop reason: SURG

## 2018-02-16 RX ORDER — ONDANSETRON 4 MG/1
4 TABLET, FILM COATED ORAL EVERY 6 HOURS PRN
Qty: 10 TABLET | Refills: 1 | Status: SHIPPED | OUTPATIENT
Start: 2018-02-16 | End: 2018-04-19 | Stop reason: ALTCHOICE

## 2018-02-16 RX ORDER — LIDOCAINE HYDROCHLORIDE 20 MG/ML
INJECTION, SOLUTION INFILTRATION; PERINEURAL AS NEEDED
Status: DISCONTINUED | OUTPATIENT
Start: 2018-02-16 | End: 2018-02-16 | Stop reason: SURG

## 2018-02-16 RX ORDER — LIDOCAINE HYDROCHLORIDE 10 MG/ML
0.5 INJECTION, SOLUTION EPIDURAL; INFILTRATION; INTRACAUDAL; PERINEURAL ONCE AS NEEDED
Status: DISCONTINUED | OUTPATIENT
Start: 2018-02-16 | End: 2018-02-16 | Stop reason: HOSPADM

## 2018-02-16 RX ORDER — KETOROLAC TROMETHAMINE 30 MG/ML
INJECTION, SOLUTION INTRAMUSCULAR; INTRAVENOUS AS NEEDED
Status: DISCONTINUED | OUTPATIENT
Start: 2018-02-16 | End: 2018-02-16 | Stop reason: SURG

## 2018-02-16 RX ORDER — HYDRALAZINE HYDROCHLORIDE 20 MG/ML
5 INJECTION INTRAMUSCULAR; INTRAVENOUS
Status: DISCONTINUED | OUTPATIENT
Start: 2018-02-16 | End: 2018-02-16 | Stop reason: HOSPADM

## 2018-02-16 RX ORDER — PROMETHAZINE HYDROCHLORIDE 25 MG/1
25 TABLET ORAL ONCE AS NEEDED
Status: COMPLETED | OUTPATIENT
Start: 2018-02-16 | End: 2018-02-16

## 2018-02-16 RX ORDER — ACETAMINOPHEN 325 MG/1
650 TABLET ORAL ONCE
Status: COMPLETED | OUTPATIENT
Start: 2018-02-16 | End: 2018-02-16

## 2018-02-16 RX ORDER — PROMETHAZINE HYDROCHLORIDE 25 MG/ML
6.25 INJECTION, SOLUTION INTRAMUSCULAR; INTRAVENOUS ONCE AS NEEDED
Status: COMPLETED | OUTPATIENT
Start: 2018-02-16 | End: 2018-02-16

## 2018-02-16 RX ORDER — PROMETHAZINE HYDROCHLORIDE 25 MG/1
25 SUPPOSITORY RECTAL ONCE AS NEEDED
Status: COMPLETED | OUTPATIENT
Start: 2018-02-16 | End: 2018-02-16

## 2018-02-16 RX ORDER — OXYCODONE HYDROCHLORIDE AND ACETAMINOPHEN 5; 325 MG/1; MG/1
1 TABLET ORAL ONCE AS NEEDED
Status: DISCONTINUED | OUTPATIENT
Start: 2018-02-16 | End: 2018-02-16 | Stop reason: HOSPADM

## 2018-02-16 RX ORDER — FAMOTIDINE 10 MG/ML
20 INJECTION, SOLUTION INTRAVENOUS ONCE
Status: COMPLETED | OUTPATIENT
Start: 2018-02-16 | End: 2018-02-16

## 2018-02-16 RX ORDER — DIPHENHYDRAMINE HYDROCHLORIDE 50 MG/ML
12.5 INJECTION INTRAMUSCULAR; INTRAVENOUS
Status: DISCONTINUED | OUTPATIENT
Start: 2018-02-16 | End: 2018-02-16 | Stop reason: HOSPADM

## 2018-02-16 RX ORDER — PROPOFOL 10 MG/ML
VIAL (ML) INTRAVENOUS AS NEEDED
Status: DISCONTINUED | OUTPATIENT
Start: 2018-02-16 | End: 2018-02-16 | Stop reason: SURG

## 2018-02-16 RX ORDER — ROCURONIUM BROMIDE 10 MG/ML
INJECTION, SOLUTION INTRAVENOUS AS NEEDED
Status: DISCONTINUED | OUTPATIENT
Start: 2018-02-16 | End: 2018-02-16 | Stop reason: SURG

## 2018-02-16 RX ORDER — NALOXONE HCL 0.4 MG/ML
0.4 VIAL (ML) INJECTION AS NEEDED
Status: DISCONTINUED | OUTPATIENT
Start: 2018-02-16 | End: 2018-02-16 | Stop reason: HOSPADM

## 2018-02-16 RX ORDER — NALBUPHINE HCL 10 MG/ML
10 AMPUL (ML) INJECTION EVERY 4 HOURS PRN
Status: DISCONTINUED | OUTPATIENT
Start: 2018-02-16 | End: 2018-02-16 | Stop reason: HOSPADM

## 2018-02-16 RX ORDER — FENTANYL CITRATE 50 UG/ML
INJECTION, SOLUTION INTRAMUSCULAR; INTRAVENOUS AS NEEDED
Status: DISCONTINUED | OUTPATIENT
Start: 2018-02-16 | End: 2018-02-16 | Stop reason: SURG

## 2018-02-16 RX ORDER — MIDAZOLAM HYDROCHLORIDE 1 MG/ML
1 INJECTION INTRAMUSCULAR; INTRAVENOUS
Status: DISCONTINUED | OUTPATIENT
Start: 2018-02-16 | End: 2018-02-16 | Stop reason: HOSPADM

## 2018-02-16 RX ORDER — BUPIVACAINE HYDROCHLORIDE AND EPINEPHRINE 5; 5 MG/ML; UG/ML
INJECTION, SOLUTION PERINEURAL AS NEEDED
Status: DISCONTINUED | OUTPATIENT
Start: 2018-02-16 | End: 2018-02-16 | Stop reason: HOSPADM

## 2018-02-16 RX ORDER — FENTANYL CITRATE 50 UG/ML
50 INJECTION, SOLUTION INTRAMUSCULAR; INTRAVENOUS
Status: DISCONTINUED | OUTPATIENT
Start: 2018-02-16 | End: 2018-02-16 | Stop reason: HOSPADM

## 2018-02-16 RX ORDER — SODIUM CHLORIDE, SODIUM LACTATE, POTASSIUM CHLORIDE, CALCIUM CHLORIDE 600; 310; 30; 20 MG/100ML; MG/100ML; MG/100ML; MG/100ML
9 INJECTION, SOLUTION INTRAVENOUS CONTINUOUS
Status: DISCONTINUED | OUTPATIENT
Start: 2018-02-16 | End: 2018-02-16 | Stop reason: HOSPADM

## 2018-02-16 RX ORDER — MIDAZOLAM HYDROCHLORIDE 1 MG/ML
2 INJECTION INTRAMUSCULAR; INTRAVENOUS
Status: DISCONTINUED | OUTPATIENT
Start: 2018-02-16 | End: 2018-02-16 | Stop reason: HOSPADM

## 2018-02-16 RX ORDER — SODIUM CHLORIDE 0.9 % (FLUSH) 0.9 %
1-10 SYRINGE (ML) INJECTION AS NEEDED
Status: DISCONTINUED | OUTPATIENT
Start: 2018-02-16 | End: 2018-02-16 | Stop reason: HOSPADM

## 2018-02-16 RX ORDER — ACETAMINOPHEN 650 MG/1
650 SUPPOSITORY RECTAL ONCE AS NEEDED
Status: DISCONTINUED | OUTPATIENT
Start: 2018-02-16 | End: 2018-02-16 | Stop reason: HOSPADM

## 2018-02-16 RX ORDER — HYDROCODONE BITARTRATE AND ACETAMINOPHEN 5; 325 MG/1; MG/1
TABLET ORAL
Qty: 30 TABLET | Refills: 0 | Status: SHIPPED | OUTPATIENT
Start: 2018-02-16 | End: 2018-04-19 | Stop reason: ALTCHOICE

## 2018-02-16 RX ORDER — SODIUM CHLORIDE 9 MG/ML
INJECTION, SOLUTION INTRAVENOUS AS NEEDED
Status: DISCONTINUED | OUTPATIENT
Start: 2018-02-16 | End: 2018-02-16 | Stop reason: HOSPADM

## 2018-02-16 RX ORDER — NALBUPHINE HCL 10 MG/ML
2 AMPUL (ML) INJECTION EVERY 4 HOURS PRN
Status: DISCONTINUED | OUTPATIENT
Start: 2018-02-16 | End: 2018-02-16 | Stop reason: HOSPADM

## 2018-02-16 RX ORDER — DEXAMETHASONE SODIUM PHOSPHATE 10 MG/ML
INJECTION INTRAMUSCULAR; INTRAVENOUS AS NEEDED
Status: DISCONTINUED | OUTPATIENT
Start: 2018-02-16 | End: 2018-02-16 | Stop reason: SURG

## 2018-02-16 RX ORDER — ACETAMINOPHEN 325 MG/1
650 TABLET ORAL ONCE AS NEEDED
Status: DISCONTINUED | OUTPATIENT
Start: 2018-02-16 | End: 2018-02-16 | Stop reason: HOSPADM

## 2018-02-16 RX ADMIN — ONDANSETRON 4 MG: 2 INJECTION INTRAMUSCULAR; INTRAVENOUS at 10:38

## 2018-02-16 RX ADMIN — HYDROMORPHONE HYDROCHLORIDE 0.5 MG: 2 INJECTION INTRAMUSCULAR; INTRAVENOUS; SUBCUTANEOUS at 11:43

## 2018-02-16 RX ADMIN — ACETAMINOPHEN 650 MG: 325 TABLET, FILM COATED ORAL at 09:13

## 2018-02-16 RX ADMIN — PROPOFOL 150 MG: 10 INJECTION, EMULSION INTRAVENOUS at 10:15

## 2018-02-16 RX ADMIN — MIDAZOLAM 2 MG: 1 INJECTION INTRAMUSCULAR; INTRAVENOUS at 09:13

## 2018-02-16 RX ADMIN — FENTANYL CITRATE 50 MCG: 50 INJECTION INTRAMUSCULAR; INTRAVENOUS at 10:27

## 2018-02-16 RX ADMIN — PROPOFOL 50 MG: 10 INJECTION, EMULSION INTRAVENOUS at 10:27

## 2018-02-16 RX ADMIN — FAMOTIDINE 20 MG: 10 INJECTION, SOLUTION INTRAVENOUS at 09:13

## 2018-02-16 RX ADMIN — MIDAZOLAM 2 MG: 1 INJECTION INTRAMUSCULAR; INTRAVENOUS at 10:07

## 2018-02-16 RX ADMIN — KETOROLAC TROMETHAMINE 30 MG: 30 INJECTION, SOLUTION INTRAMUSCULAR; INTRAVENOUS at 10:38

## 2018-02-16 RX ADMIN — PROMETHAZINE HYDROCHLORIDE 6.25 MG: 25 INJECTION INTRAMUSCULAR; INTRAVENOUS at 11:35

## 2018-02-16 RX ADMIN — ROCURONIUM BROMIDE 30 MG: 10 INJECTION INTRAVENOUS at 10:15

## 2018-02-16 RX ADMIN — FENTANYL CITRATE 50 MCG: 50 INJECTION, SOLUTION INTRAMUSCULAR; INTRAVENOUS at 11:17

## 2018-02-16 RX ADMIN — FENTANYL CITRATE 50 MCG: 50 INJECTION INTRAMUSCULAR; INTRAVENOUS at 10:15

## 2018-02-16 RX ADMIN — FENTANYL CITRATE 50 MCG: 50 INJECTION, SOLUTION INTRAMUSCULAR; INTRAVENOUS at 11:11

## 2018-02-16 RX ADMIN — SODIUM CHLORIDE, POTASSIUM CHLORIDE, SODIUM LACTATE AND CALCIUM CHLORIDE: 600; 310; 30; 20 INJECTION, SOLUTION INTRAVENOUS at 10:40

## 2018-02-16 RX ADMIN — HYDROMORPHONE HYDROCHLORIDE 0.5 MG: 2 INJECTION INTRAMUSCULAR; INTRAVENOUS; SUBCUTANEOUS at 11:27

## 2018-02-16 RX ADMIN — DEXAMETHASONE SODIUM PHOSPHATE 4 MG: 10 INJECTION INTRAMUSCULAR; INTRAVENOUS at 10:38

## 2018-02-16 RX ADMIN — LIDOCAINE HYDROCHLORIDE 100 MG: 20 INJECTION, SOLUTION INFILTRATION; PERINEURAL at 10:15

## 2018-02-16 RX ADMIN — SODIUM CHLORIDE, POTASSIUM CHLORIDE, SODIUM LACTATE AND CALCIUM CHLORIDE 9 ML/HR: 600; 310; 30; 20 INJECTION, SOLUTION INTRAVENOUS at 11:06

## 2018-02-16 RX ADMIN — SODIUM CHLORIDE, POTASSIUM CHLORIDE, SODIUM LACTATE AND CALCIUM CHLORIDE: 600; 310; 30; 20 INJECTION, SOLUTION INTRAVENOUS at 10:13

## 2018-02-16 NOTE — ANESTHESIA PROCEDURE NOTES
Airway  Urgency: elective    Airway not difficult    General Information and Staff    Patient location during procedure: OR  Anesthesiologist: RENDER, LOPEZ RAY  CRNA: RUSS DIXON    Indications and Patient Condition  Indications for airway management: airway protection    Preoxygenated: yes  MILS not maintained throughout  Mask difficulty assessment: 1 - vent by mask    Final Airway Details  Final airway type: endotracheal airway      Successful airway: ETT  Cuffed: yes   Successful intubation technique: direct laryngoscopy  Facilitating devices/methods: intubating stylet  Endotracheal tube insertion site: oral  Blade: Amezcua  Blade size: #2  ETT size: 7.0 mm  Cormack-Lehane Classification: grade I - full view of glottis  Placement verified by: chest auscultation and capnometry   Cuff volume (mL): 8  Measured from: lips  Number of attempts at approach: 1    Additional Comments  Ett placed easily, appears atraumatic, dentition intact

## 2018-02-16 NOTE — ANESTHESIA POSTPROCEDURE EVALUATION
Patient: Marguerite Abarca    Procedure Summary     Date Anesthesia Start Anesthesia Stop Room / Location    02/16/18 1013 1050  [facility] /  [facility]       Procedure Diagnosis Surgeon Provider    CHOLECYSTECTOMY LAPAROSCOPIC  (N/A Abdomen) Biliary dyskinesia  (Biliary dyskinesia [K82.8]) MD Chicho Mike MD          Anesthesia Type: general  Last vitals  BP   146/79 (02/16/18 1245)   Temp   36.8 °C (98.3 °F) (02/16/18 1049)   Pulse   59 (02/16/18 1245)   Resp   16 (02/16/18 1245)     SpO2   99 % (02/16/18 1245)     Post Anesthesia Care and Evaluation    Patient location during evaluation: bedside  Patient participation: complete - patient participated  Level of consciousness: awake  Pain score: 1  Pain management: adequate  Airway patency: patent  Anesthetic complications: No anesthetic complications    Cardiovascular status: acceptable  Respiratory status: acceptable  Hydration status: acceptable    Comments: --------------------            02/16/18               1245     --------------------   BP:       146/79     Pulse:      59       Resp:       16       Temp:                SpO2:      99%      --------------------

## 2018-02-16 NOTE — OP NOTE
PREOPERATIVE DIAGNOSIS:  Biliary dyskinesia    POSTOPERATIVE DIAGNOSIS (FINDINGS):  Same    PROCEDURE:  Laparoscopic cholecystectomy    SURGEON:  Silvano Rushing MD    ASSISTANT:  Tamica Kaufman    ANESTHESIA:  General    EBL:  Minimal    SPECIMEN(S):  Gallbladder    DESCRIPTION:  Supine position. General anesthesia. Prepped and draped, usual sterile manner.    1/2 % marcaine with epinephrine infiltrated in all incision sites. Small periumbilical incision made, Veress needle inserted with upward traction on abdominal wall. Abdomen insufflated to 15 mm Hg pressure and 5 mm opti-view trocar inserted followed by 10 mm subxiphoid and 5 mm right subcostal trocars.    Gallbladder grasped and elevated. Cystic duct dissected and clipped twice proximally and once distally and divided.  Cystic artery clipped twice proximally, once distally and divided. Gallbladder removed from the liver bed with electrocautery and removed through subxiphoid trocar site. Liver bed copiously irrigated and aspirated with good hemostasis noted. CO2 released, fascia of subxiphoid trocar site closed with 0-vicryl, skin edges 5-0 vicryl subcuticular suture.    Tolerated well.  Stable to PACU.    Silvano Rushing M.D.

## 2018-02-16 NOTE — DISCHARGE INSTRUCTIONS
Dr. Silvano Rushing  [address]  [place] [id number] [phone]    Discharge Instructions for Gall Bladder Surgery      1. Go home, rest and take it easy today; however, you should get up and move about several times today to reduce the risk of developing a clot in your legs.      2. You may experience some dizziness or memory loss from the anesthesia.  This may last for the next 24 hours.  Someone should plan on staying with you for the first 24 hours for your safety.    3. Do not make any important legal decisions or sign any legal papers for the next 24 hours.      4. Eat and drink lightly today.  Start off with liquids, jello, soup, crackers or other bland foods at first. You may advance your diet tomorrow as tolerated as long as you do not experience any nausea or vomiting.     5. If skin glue (Dermabond) was used, your incisions are protected and covered.  The invisible glue will dissolve on its own as your incision heals. If you have dressings, you may remove your outer dressings in 3 days.  The white tapes called steri-strips should stay in place.  They will fall off on their own in 1-2 weeks.  Do not worry if they come off sooner.      6. If you have dressings, you may notice some bleeding/drainage on your outer dressings. A little bloody drainage is normal. If the bleeding/drainage is such that the bandage cannot absorb it, remove the dressing, apply clean gauze and apply firm pressure for a full 15 minutes.  If the bleeding continues, please call me.    7. You may shower tomorrow allowing water to run over the incisions; however, do not scrub the incisions.   No tub baths until your incisions are completely healed.         8. You have received a prescription for a narcotic pain medicine, as you will have some pain following surgery.   You will not be totally pain free, but your pain medicine should make the pain tolerable.  Please take your pain medicine as prescribed and always take  your pills with food to prevent nausea. If you are having severe pain that cannot be controlled by the pain medicine, please contact me.      9. You have also received a prescription for an anti-nausea medicine.  Please take this as prescribed for any nausea or vomiting.  Nausea could be a result of the anesthesia or a result of the narcotic pain medicine.  If you experience severe nausea and vomiting that cannot be controlled by the nausea medicine, please call me.            10. It is not unusual to experience pain/discomfort in your shoulders or under your ribs after surgery.  It is from the gas used during the laparoscopic procedure and usually lasts 1-3 days.  The prescription pain medicine is used to treat the surgical pain and does not typically alleviate this “gassy” pain.     11. No driving for 24 hours and for as long as you are taking your prescription pain medicine.      12. You will need to call the office at [phone] to schedule a follow-up appointment in 6-10 days.     13. Remember to contact me for any of the following:    • Fever > 101 degrees  • Severe pain that cannot be controlled by taking your pain pills  • Severe nausea or vomiting that cannot be controlled by taking your nausea pills  • Significant bleeding of your incisions  • Drainage that has a bad smell or is yellow or green in appearance  • Any other questions or concerns

## 2018-02-16 NOTE — ANESTHESIA PREPROCEDURE EVALUATION
Anesthesia Evaluation     no history of anesthetic complications:  NPO Solid Status: > 8 hours             Airway   Mallampati: II  TM distance: >3 FB  Neck ROM: full  no difficulty expected  Dental - normal exam     Pulmonary - negative pulmonary ROS and normal exam   Cardiovascular   Exercise tolerance: good (4-7 METS)    Rhythm: regular    (-) murmur      Neuro/Psych- negative ROS  GI/Hepatic/Renal/Endo    (+)  GERD,     Musculoskeletal     Abdominal  - normal exam   Substance History      OB/GYN          Other                      Anesthesia Plan    ASA 2     general   (  D/W R&B of GA including but not limited to: heart, lung, liver, kidney, neurologic problems, positioning injuries, dental damage, corneal abrasion and TMJ.  .)  intravenous induction   Anesthetic plan and risks discussed with patient.

## 2018-02-16 NOTE — PLAN OF CARE
Problem: Patient Care Overview (Adult)  Goal: Plan of Care Review  Outcome: Ongoing (interventions implemented as appropriate)   02/16/18 1256   Coping/Psychosocial Response Interventions   Plan Of Care Reviewed With patient   Patient Care Overview   Progress improving   Outcome Evaluation   Outcome Summary/Follow up Plan stablle recovery     Goal: Adult Individualization and Mutuality  Outcome: Ongoing (interventions implemented as appropriate)    Goal: Discharge Needs Assessment  Outcome: Ongoing (interventions implemented as appropriate)   02/16/18 1256   Discharge Needs Assessment   Concerns To Be Addressed no discharge needs identified

## 2018-02-22 ENCOUNTER — OFFICE VISIT (OUTPATIENT)
Dept: SURGERY | Facility: CLINIC | Age: 58
End: 2018-02-22

## 2018-02-22 DIAGNOSIS — Z09 FOLLOW UP: Primary | ICD-10-CM

## 2018-02-22 PROCEDURE — 99024 POSTOP FOLLOW-UP VISIT: CPT | Performed by: SURGERY

## 2018-02-23 NOTE — PROGRESS NOTES
Laparoscopic cholecystectomy 2/16/2018    Pathology: Minimal chronic inflammation    Office visit: Incisions are healing well, she is doing well and reporting no problems from the surgery.  Will return as needed.

## 2018-03-08 ENCOUNTER — TELEPHONE (OUTPATIENT)
Dept: SURGERY | Facility: CLINIC | Age: 58
End: 2018-03-08

## 2018-03-08 NOTE — TELEPHONE ENCOUNTER
These symptoms are not related to her gallbladder surgery and I would strongly advise her to immediately check with her primary care physician or go to the emergency room to make sure they're not cardiac in origin.

## 2018-03-08 NOTE — TELEPHONE ENCOUNTER
Spoke with patient, advised her of Dr. Rushing's response. Pt voiced understanding and stated she will go the ER

## 2018-03-08 NOTE — TELEPHONE ENCOUNTER
Pt states that she is has started to have numbness in her feet/and hands that started this past week. She states that she is having a real sharp pain in her chest that goes down her right arm to her elbow. She would like to know what she can do about these symptoms.    Pt had mayra garcia 2-16-18.

## 2018-03-29 ENCOUNTER — OFFICE VISIT CONVERTED (OUTPATIENT)
Dept: FAMILY MEDICINE CLINIC | Age: 58
End: 2018-03-29
Attending: NURSE PRACTITIONER

## 2018-04-19 ENCOUNTER — OFFICE VISIT (OUTPATIENT)
Dept: OBSTETRICS AND GYNECOLOGY | Age: 58
End: 2018-04-19

## 2018-04-19 ENCOUNTER — TELEPHONE (OUTPATIENT)
Dept: OBSTETRICS AND GYNECOLOGY | Age: 58
End: 2018-04-19

## 2018-04-19 VITALS
HEIGHT: 63 IN | DIASTOLIC BLOOD PRESSURE: 88 MMHG | BODY MASS INDEX: 27.64 KG/M2 | SYSTOLIC BLOOD PRESSURE: 154 MMHG | WEIGHT: 156 LBS

## 2018-04-19 DIAGNOSIS — N89.8 VAGINAL IRRITATION: ICD-10-CM

## 2018-04-19 DIAGNOSIS — N90.89 LABIAL LESION: ICD-10-CM

## 2018-04-19 DIAGNOSIS — R35.0 URINE FREQUENCY: ICD-10-CM

## 2018-04-19 DIAGNOSIS — N39.3 SUI (STRESS URINARY INCONTINENCE, FEMALE): Primary | ICD-10-CM

## 2018-04-19 DIAGNOSIS — N89.8 VAGINAL ITCHING: ICD-10-CM

## 2018-04-19 DIAGNOSIS — B37.31 YEAST VAGINITIS: ICD-10-CM

## 2018-04-19 LAB
BILIRUB BLD-MCNC: NEGATIVE MG/DL
CLARITY, POC: CLEAR
COLOR UR: YELLOW
GLUCOSE UR STRIP-MCNC: NEGATIVE MG/DL
KETONES UR QL: NEGATIVE
LEUKOCYTE EST, POC: ABNORMAL
NITRITE UR-MCNC: NEGATIVE MG/ML
PH UR: 6 [PH] (ref 5–8)
PROT UR STRIP-MCNC: NEGATIVE MG/DL
RBC # UR STRIP: ABNORMAL /UL
SP GR UR: 1.02 (ref 1–1.03)
UROBILINOGEN UR QL: NORMAL

## 2018-04-19 PROCEDURE — 81003 URINALYSIS AUTO W/O SCOPE: CPT | Performed by: OBSTETRICS & GYNECOLOGY

## 2018-04-19 PROCEDURE — G0328 FECAL BLOOD SCRN IMMUNOASSAY: HCPCS | Performed by: OBSTETRICS & GYNECOLOGY

## 2018-04-19 PROCEDURE — 87210 SMEAR WET MOUNT SALINE/INK: CPT | Performed by: OBSTETRICS & GYNECOLOGY

## 2018-04-19 PROCEDURE — 99213 OFFICE O/P EST LOW 20 MIN: CPT | Performed by: OBSTETRICS & GYNECOLOGY

## 2018-04-19 RX ORDER — FLUCONAZOLE 150 MG/1
TABLET ORAL
Qty: 2 TABLET | Refills: 2 | Status: SHIPPED | OUTPATIENT
Start: 2018-04-19 | End: 2018-07-30

## 2018-04-19 NOTE — PROGRESS NOTES
" GYN PROBLEM EXAM                                    2018    Subjective   Bryant Abarca is a 57 y.o.  Female,      Chief complaint:  Follow-up (BRYANT NOTICED A VAGNIAL \"KNOT\" AND IS EXPERIECING INCONTINENCE.  SHE HAS TRIED CORTISONE 10 OTC WITH NO RELIEF.  ALSO, EXPERIEINCING VAGINAL ITCHING AND URINE FREQUENCY.  SHE FEELS THAT SHE MAY BE ALLERGIC TO THE PAD THAT SHE WEARS FOR INCONTINENENCE.   SHE HAD HER GALLBLADDER REMOVED ON 2018. SHE IS STILL HAVING UPPER MIDDLE AB PAIN. )    History of Present Illness:  ***    The following portions of the patient's history were reviewed / updated as appropriate:   allergies, current medications,  past medical history, past surgical history, past family history, past social history, and problem list.    Review of Systems    /88   Ht 160 cm (63\")   Wt 70.8 kg (156 lb)   Breastfeeding? No   BMI 27.63 kg/m²     Objective   OBGyn Exam     PHYSICAL EXAM      Well-developed, well-nourished,***, in no distress, alert and well oriented ×3.          Right breast nontender, without dominant mass.  No nipple discharge.         No superficial skin changes.  No axillary adenopathy.      Left breast nontender, without dominant mass.  No nipple discharge.         No superficial skin changes.  No axillary adenopathy.           Abdomen is flat, soft and nontender.No palpable mass.           No hepatosplenomegaly.  Flanks are negative.          Bowel sounds normal.  No abdominal bruit.          No inguinal lymphadenopathy bilaterally.          Pelvic exam :  Vulva normal.           External genitalia normal.  BUS negative.             Introitus normal.  Vaginal mucosa normal.            Cervix normal,***, no cervical motion tenderness.          Uterus ***, normal size, normal shape, and position.          Adnexa are negative bilaterally.  No tenderness.  No palpable mass.          Rectovaginal exam *** .  Stool heme negative.         Lower extremities " without edema. ***         Coordination is grossly normal.         Mood and affect is normal.  Behavior normal.  Thought content normal.            Judgment normal.         Assessment/Plan   Marguerite was seen today for follow-up.    Diagnoses and all orders for this visit:    Urine frequency  -     POC Urinalysis Dipstick, Automated    Labial lesion  -     Herpes Simplex Virus (HSV) 1 & 2, RAJANI - Swab, Vulva    Vaginal itching  -     NuSwab Vaginitis (VG) - Swab, Vagina    Vaginal irritation  -     NuSwab Vaginitis (VG) - Swab, Vagina    Yeast vaginitis  -     fluconazole (DIFLUCAN) 150 MG tablet; TAKE ONE TABLET TODAY AND REPEAT IN THREE DAYS  -     terconazole (TERAZOL 7) 0.4 % vaginal cream; Insert 1 applicator into the vagina Every Night.        COMMENTS: ***    Malik Lopez MD  4/19/2018

## 2018-04-19 NOTE — TELEPHONE ENCOUNTER
Pt states get vag cysts when she eats chocolate. Pt has not been eating chocolate, but has developed a knot that she feels is going into the vagina. Pt states she has to wear an incontinence pad at work, and thinks that may be irritating it. Pt has tried cortisone 10, no relief. Asking for some type of cream to be sent in. Pharm on file.    Pt # [phone]

## 2018-04-19 NOTE — PROGRESS NOTES
"GYN FOLLOW UP/PELVIC ULTRASOUND            2018    Subjective   Bryant Abarca is a 57 y.o.  Female        Chief complaint:  Follow-up (BRYANT NOTICED A VAGNIAL \"KNOT\" AND IS EXPERIECING INCONTINENCE.  SHE HAS TRIED CORTISONE 10 OTC WITH NO RELIEF.  ALSO, EXPERIEINCING VAGINAL ITCHING AND URINE FREQUENCY.  SHE FEELS THAT SHE MAY BE ALLERGIC TO THE PAD THAT SHE WEARS FOR INCONTINENENCE.   SHE HAD HER GALLBLADDER REMOVED ON 2018. SHE IS STILL HAVING UPPER MIDDLE AB PAIN. )    History of Present Illness:       Bryant has noticed progressive urinary incontinence since her last visit 2017.  She had been having this problem all of last year, but had been able to deal with that it the time of her annual exam on .  However since then it is steadily gotten worse and especially in the last 2 months it has gotten worse.  She has a sudden spurt of urine with most any activity, picking things up, lifting things, handing things off, always with cough or sneeze, getting up off the couch.  Any least strenuous activity will produce stress urinary incontinence.  She also has urgency incontinence if she is full as she is trying to get to the bathroom.  She will also notice that it takes her long time to finish emptying but she does feel like she totally empties.  But then when she gets up and leaves the bathroom she'll notice a sudden spurt of urine again.  She is changing pads about every 2 hours, they are soaked.  She has some vulvar irritation, the labia get red and \"stinging\".  More recently she has had some strong itching and irritation in the vagina and also on the vulva and had used some cortisone 10 with minimal effect but did feel better with Neosporin.     She feels that she gets some \"knots\" on the vulva whenever she eats chocolate.     In February she had a laparoscopic cholecystectomy by Dr. Rushing because of a dysfunctional gallbladder (HIDA scan 13%), and is still " "having some upper abdominal pain.  At that time she received some antibiotics at the time of her surgery.  But she also had some antibiotics for an upper respiratory infection about 2 weeks ago when she had a temperature to 100.2.  That has since resolved but during this time she did notice that the itching was worse.  She was on antibiotics for about 10 days.     She had not had intercourse for about 3 months until the last week when she had intercourse again.  She does notice some leakage of urine with intercourse as well.     Review of Systems: Denies dysuria.  Discharge seems to be about the same.  No vaginal bleeding.  She has changed to a generic estradiol patch, 0.0375 mg twice weekly    The following portions of the patient's history were reviewed / updated as appropriate:   allergies, current medications,  past medical history, past surgical history, past family history, past social history, and problem list.    Objective     /88   Ht 160 cm (63\")   Wt 70.8 kg (156 lb)   Breastfeeding? No   BMI 27.63 kg/m²     PHYSICAL EXAM     Gen'l : Well-developed, overweight  female in no distress.  Well oriented ×3.       Abd   : Not examined.       Pelvic: External genitalia with some mild erythema of the labia.  A few crusty places on the lower mons pubis suggestive of eczema.  There is a linear raw skin lesion in the interlabial fold on the right that is probably an excoriation from a fingernail.  Could be herpes but was not tender on a swab for HSV and she has no palpable lymph nodes.  Vagina with a thick yellow copious discharge that on wet prep has 4+ budding yeast.  No Trichomonas or BV seen.  NuSwab taken for vaginitis.  She does have some pelvic relaxation on external exam.  But I did not demonstrate any stress urinary incontinence.  There is some vaginal laxity.     On bimanual exam the cul-de-sac and pelvis were negative but it felt like her bladder was a little full.  She will void and we " will check a transabdominal ultrasound.    PELVIC ULTRASOUND - performed after the patient sat on the toilet and emptied her bladder.  Ultrasound was immediately following.     Uterus: Surgically absent.     Left ovary: Surgically absent.     Right ovary: Surgically absent.     Cul-de-sac: No free fluid or masses.       Transabdominal ultrasound showed the bladder to be completely empty.       ASSESSMENT: Bladder completely empties, based on ultrasound.       RECOMMENDATION: Ultimately will need a urogynecology evaluation for TVT.      Assessment/Plan   Marguerite was seen today for follow-up.    Diagnoses and all orders for this visit:    RELL (stress urinary incontinence, female)  -     POC Urinalysis Dipstick, Automated    Yeast vaginitis  -     fluconazole (DIFLUCAN) 150 MG tablet; TAKE ONE TABLET TODAY AND REPEAT IN THREE DAYS  -     terconazole (TERAZOL 7) 0.4 % vaginal cream; Insert 1 applicator into the vagina Every Night.    Labial lesion  -     Herpes Simplex Virus (HSV) 1 & 2, RAJANI - Swab, Vulva    Vaginal itching  -     NuSwab Vaginitis (VG) - Swab, Vagina    Vaginal irritation  -     NuSwab Vaginitis (VG) - Swab, Vagina      COMMENTS: We'll treat for yeast vulvovaginitis and then refer on to urogynecology for TVT.    Malik Lopez M.D.         4/19/2018

## 2018-04-22 LAB
HSV1 DNA SPEC QL NAA+PROBE: NEGATIVE
HSV2 DNA SPEC QL NAA+PROBE: POSITIVE

## 2018-04-24 LAB
A VAGINAE DNA VAG QL NAA+PROBE: ABNORMAL SCORE
BVAB2 DNA VAG QL NAA+PROBE: ABNORMAL SCORE
C ALBICANS DNA VAG QL NAA+PROBE: POSITIVE
C GLABRATA DNA VAG QL NAA+PROBE: NEGATIVE
MEGA1 DNA VAG QL NAA+PROBE: ABNORMAL SCORE
T VAGINALIS RRNA SPEC QL NAA+PROBE: NEGATIVE

## 2018-05-02 ENCOUNTER — TELEPHONE (OUTPATIENT)
Dept: OBSTETRICS AND GYNECOLOGY | Age: 58
End: 2018-05-02

## 2018-05-02 NOTE — TELEPHONE ENCOUNTER
Pt would like you to call again today, please leave number so pt can call you back if she misses your call    Pt # [phone]

## 2018-05-11 DIAGNOSIS — N39.3 SUI (STRESS URINARY INCONTINENCE, FEMALE): Primary | ICD-10-CM

## 2018-05-11 NOTE — TELEPHONE ENCOUNTER
May 11, 2018.  5:55 PM.  I spoke to Marguerite clemente and apologized for being so slow getting her referral done.     Dr. Baez and Dr. Mariscal are no longer doing TVT's  and so I have placed a consult for either Dr. David Pelayo or Dr. Anglin for an evaluation for a TVT.

## 2018-05-25 ENCOUNTER — OFFICE VISIT CONVERTED (OUTPATIENT)
Dept: FAMILY MEDICINE CLINIC | Age: 58
End: 2018-05-25
Attending: NURSE PRACTITIONER

## 2018-06-25 ENCOUNTER — OFFICE VISIT CONVERTED (OUTPATIENT)
Dept: FAMILY MEDICINE CLINIC | Age: 58
End: 2018-06-25
Attending: NURSE PRACTITIONER

## 2018-07-30 ENCOUNTER — APPOINTMENT (OUTPATIENT)
Dept: PREADMISSION TESTING | Facility: HOSPITAL | Age: 58
End: 2018-07-30

## 2018-07-30 VITALS
RESPIRATION RATE: 16 BRPM | HEART RATE: 61 BPM | SYSTOLIC BLOOD PRESSURE: 143 MMHG | HEIGHT: 63 IN | BODY MASS INDEX: 27.53 KG/M2 | OXYGEN SATURATION: 99 % | TEMPERATURE: 98.3 F | WEIGHT: 155.38 LBS | DIASTOLIC BLOOD PRESSURE: 81 MMHG

## 2018-07-30 LAB
ANION GAP SERPL CALCULATED.3IONS-SCNC: 9.9 MMOL/L
BACTERIA UR QL AUTO: NORMAL /HPF
BASOPHILS # BLD AUTO: 0.01 10*3/MM3 (ref 0–0.2)
BASOPHILS NFR BLD AUTO: 0.2 % (ref 0–1.5)
BILIRUB UR QL STRIP: NEGATIVE
BUN BLD-MCNC: 8 MG/DL (ref 6–20)
BUN/CREAT SERPL: 10.5 (ref 7–25)
CALCIUM SPEC-SCNC: 9 MG/DL (ref 8.6–10.5)
CHLORIDE SERPL-SCNC: 107 MMOL/L (ref 98–107)
CLARITY UR: CLEAR
CO2 SERPL-SCNC: 26.1 MMOL/L (ref 22–29)
COLOR UR: YELLOW
CREAT BLD-MCNC: 0.76 MG/DL (ref 0.57–1)
DEPRECATED RDW RBC AUTO: 49 FL (ref 37–54)
EOSINOPHIL # BLD AUTO: 0.19 10*3/MM3 (ref 0–0.7)
EOSINOPHIL NFR BLD AUTO: 3.5 % (ref 0.3–6.2)
ERYTHROCYTE [DISTWIDTH] IN BLOOD BY AUTOMATED COUNT: 13.5 % (ref 11.7–13)
GFR SERPL CREATININE-BSD FRML MDRD: 78 ML/MIN/1.73
GLUCOSE BLD-MCNC: 88 MG/DL (ref 65–99)
GLUCOSE UR STRIP-MCNC: NEGATIVE MG/DL
HCT VFR BLD AUTO: 39.3 % (ref 35.6–45.5)
HGB BLD-MCNC: 12.4 G/DL (ref 11.9–15.5)
HGB UR QL STRIP.AUTO: ABNORMAL
HYALINE CASTS UR QL AUTO: NORMAL /LPF
IMM GRANULOCYTES # BLD: 0.01 10*3/MM3 (ref 0–0.03)
IMM GRANULOCYTES NFR BLD: 0.2 % (ref 0–0.5)
KETONES UR QL STRIP: NEGATIVE
LEUKOCYTE ESTERASE UR QL STRIP.AUTO: NEGATIVE
LYMPHOCYTES # BLD AUTO: 1.19 10*3/MM3 (ref 0.9–4.8)
LYMPHOCYTES NFR BLD AUTO: 21.7 % (ref 19.6–45.3)
MCH RBC QN AUTO: 31.2 PG (ref 26.9–32)
MCHC RBC AUTO-ENTMCNC: 31.6 G/DL (ref 32.4–36.3)
MCV RBC AUTO: 99 FL (ref 80.5–98.2)
MONOCYTES # BLD AUTO: 0.35 10*3/MM3 (ref 0.2–1.2)
MONOCYTES NFR BLD AUTO: 6.4 % (ref 5–12)
NEUTROPHILS # BLD AUTO: 3.74 10*3/MM3 (ref 1.9–8.1)
NEUTROPHILS NFR BLD AUTO: 68.2 % (ref 42.7–76)
NITRITE UR QL STRIP: NEGATIVE
PH UR STRIP.AUTO: <=5 [PH] (ref 5–8)
PLATELET # BLD AUTO: 229 10*3/MM3 (ref 140–500)
PMV BLD AUTO: 9.7 FL (ref 6–12)
POTASSIUM BLD-SCNC: 3.7 MMOL/L (ref 3.5–5.2)
PROT UR QL STRIP: NEGATIVE
RBC # BLD AUTO: 3.97 10*6/MM3 (ref 3.9–5.2)
RBC # UR: NORMAL /HPF
REF LAB TEST METHOD: NORMAL
SODIUM BLD-SCNC: 143 MMOL/L (ref 136–145)
SP GR UR STRIP: 1.02 (ref 1–1.03)
SQUAMOUS #/AREA URNS HPF: NORMAL /HPF
UROBILINOGEN UR QL STRIP: ABNORMAL
WBC NRBC COR # BLD: 5.48 10*3/MM3 (ref 4.5–10.7)
WBC UR QL AUTO: NORMAL /HPF

## 2018-07-30 PROCEDURE — 80048 BASIC METABOLIC PNL TOTAL CA: CPT | Performed by: OBSTETRICS & GYNECOLOGY

## 2018-07-30 PROCEDURE — 36415 COLL VENOUS BLD VENIPUNCTURE: CPT

## 2018-07-30 PROCEDURE — 81001 URINALYSIS AUTO W/SCOPE: CPT | Performed by: OBSTETRICS & GYNECOLOGY

## 2018-07-30 PROCEDURE — 85025 COMPLETE CBC W/AUTO DIFF WBC: CPT | Performed by: OBSTETRICS & GYNECOLOGY

## 2018-07-30 RX ORDER — NABUMETONE 750 MG/1
750 TABLET, FILM COATED ORAL 2 TIMES DAILY
COMMUNITY

## 2018-08-07 ENCOUNTER — ANESTHESIA EVENT (OUTPATIENT)
Dept: PERIOP | Facility: HOSPITAL | Age: 58
End: 2018-08-07

## 2018-08-07 ENCOUNTER — HOSPITAL ENCOUNTER (OUTPATIENT)
Facility: HOSPITAL | Age: 58
Setting detail: HOSPITAL OUTPATIENT SURGERY
Discharge: HOME | End: 2018-08-07
Attending: OBSTETRICS & GYNECOLOGY | Admitting: STUDENT IN AN ORGANIZED HEALTH CARE EDUCATION/TRAINING PROGRAM

## 2018-08-07 ENCOUNTER — ANESTHESIA (OUTPATIENT)
Dept: PERIOP | Facility: HOSPITAL | Age: 58
End: 2018-08-07

## 2018-08-07 VITALS
SYSTOLIC BLOOD PRESSURE: 149 MMHG | TEMPERATURE: 97.5 F | DIASTOLIC BLOOD PRESSURE: 84 MMHG | RESPIRATION RATE: 16 BRPM | WEIGHT: 153 LBS | BODY MASS INDEX: 27.11 KG/M2 | HEIGHT: 63 IN | HEART RATE: 77 BPM | OXYGEN SATURATION: 96 %

## 2018-08-07 PROCEDURE — C1771 REP DEV, URINARY, W/SLING: HCPCS | Performed by: OBSTETRICS & GYNECOLOGY

## 2018-08-07 PROCEDURE — 25010000002 ONDANSETRON PER 1 MG: Performed by: NURSE ANESTHETIST, CERTIFIED REGISTERED

## 2018-08-07 PROCEDURE — 25010000002 KETOROLAC TROMETHAMINE PER 15 MG: Performed by: NURSE ANESTHETIST, CERTIFIED REGISTERED

## 2018-08-07 PROCEDURE — 25010000002 EPINEPHRINE PER 0.1 MG: Performed by: OBSTETRICS & GYNECOLOGY

## 2018-08-07 PROCEDURE — 25010000002 PROPOFOL 10 MG/ML EMULSION: Performed by: NURSE ANESTHETIST, CERTIFIED REGISTERED

## 2018-08-07 PROCEDURE — P9612 CATHETERIZE FOR URINE SPEC: HCPCS

## 2018-08-07 PROCEDURE — 25010000003 CEFAZOLIN IN DEXTROSE 2-4 GM/100ML-% SOLUTION: Performed by: STUDENT IN AN ORGANIZED HEALTH CARE EDUCATION/TRAINING PROGRAM

## 2018-08-07 PROCEDURE — 25010000002 FENTANYL CITRATE (PF) 100 MCG/2ML SOLUTION: Performed by: NURSE ANESTHETIST, CERTIFIED REGISTERED

## 2018-08-07 PROCEDURE — 25010000002 MIDAZOLAM PER 1 MG: Performed by: ANESTHESIOLOGY

## 2018-08-07 PROCEDURE — 25010000002 HYDROMORPHONE PER 4 MG: Performed by: NURSE ANESTHETIST, CERTIFIED REGISTERED

## 2018-08-07 PROCEDURE — 25010000002 DEXAMETHASONE PER 1 MG: Performed by: NURSE ANESTHETIST, CERTIFIED REGISTERED

## 2018-08-07 DEVICE — TRANSVAGINAL MID-URETHRAL SLING
Type: IMPLANTABLE DEVICE | Site: VAGINA | Status: FUNCTIONAL
Brand: ADVANTAGE FIT™  SYSTEM

## 2018-08-07 RX ORDER — SCOLOPAMINE TRANSDERMAL SYSTEM 1 MG/1
1 PATCH, EXTENDED RELEASE TRANSDERMAL
Status: DISCONTINUED | OUTPATIENT
Start: 2018-08-07 | End: 2018-08-07 | Stop reason: HOSPADM

## 2018-08-07 RX ORDER — SODIUM CHLORIDE 0.9 % (FLUSH) 0.9 %
1-10 SYRINGE (ML) INJECTION AS NEEDED
Status: DISCONTINUED | OUTPATIENT
Start: 2018-08-07 | End: 2018-08-07 | Stop reason: HOSPADM

## 2018-08-07 RX ORDER — ACETAMINOPHEN 500 MG
1000 TABLET ORAL EVERY 6 HOURS PRN
Qty: 60 TABLET | Refills: 3 | Status: SHIPPED | OUTPATIENT
Start: 2018-08-07 | End: 2019-08-07

## 2018-08-07 RX ORDER — LIDOCAINE HYDROCHLORIDE 10 MG/ML
0.5 INJECTION, SOLUTION EPIDURAL; INFILTRATION; INTRACAUDAL; PERINEURAL ONCE AS NEEDED
Status: DISCONTINUED | OUTPATIENT
Start: 2018-08-07 | End: 2018-08-07 | Stop reason: HOSPADM

## 2018-08-07 RX ORDER — PROMETHAZINE HYDROCHLORIDE 25 MG/ML
12.5 INJECTION, SOLUTION INTRAMUSCULAR; INTRAVENOUS ONCE AS NEEDED
Status: DISCONTINUED | OUTPATIENT
Start: 2018-08-07 | End: 2018-08-07 | Stop reason: HOSPADM

## 2018-08-07 RX ORDER — LIDOCAINE HYDROCHLORIDE 20 MG/ML
INJECTION, SOLUTION INFILTRATION; PERINEURAL AS NEEDED
Status: DISCONTINUED | OUTPATIENT
Start: 2018-08-07 | End: 2018-08-07 | Stop reason: SURG

## 2018-08-07 RX ORDER — PROMETHAZINE HYDROCHLORIDE 25 MG/1
25 TABLET ORAL ONCE AS NEEDED
Status: DISCONTINUED | OUTPATIENT
Start: 2018-08-07 | End: 2018-08-07 | Stop reason: HOSPADM

## 2018-08-07 RX ORDER — OXYCODONE HYDROCHLORIDE AND ACETAMINOPHEN 5; 325 MG/1; MG/1
1 TABLET ORAL EVERY 6 HOURS PRN
Qty: 20 TABLET | Refills: 0 | Status: SHIPPED | OUTPATIENT
Start: 2018-08-07 | End: 2018-12-13

## 2018-08-07 RX ORDER — PROPOFOL 10 MG/ML
VIAL (ML) INTRAVENOUS AS NEEDED
Status: DISCONTINUED | OUTPATIENT
Start: 2018-08-07 | End: 2018-08-07 | Stop reason: SURG

## 2018-08-07 RX ORDER — ONDANSETRON 2 MG/ML
4 INJECTION INTRAMUSCULAR; INTRAVENOUS ONCE AS NEEDED
Status: COMPLETED | OUTPATIENT
Start: 2018-08-07 | End: 2018-08-07

## 2018-08-07 RX ORDER — SODIUM CHLORIDE, SODIUM LACTATE, POTASSIUM CHLORIDE, CALCIUM CHLORIDE 600; 310; 30; 20 MG/100ML; MG/100ML; MG/100ML; MG/100ML
125 INJECTION, SOLUTION INTRAVENOUS CONTINUOUS
Status: DISCONTINUED | OUTPATIENT
Start: 2018-08-07 | End: 2018-08-07 | Stop reason: HOSPADM

## 2018-08-07 RX ORDER — HYDRALAZINE HYDROCHLORIDE 20 MG/ML
5 INJECTION INTRAMUSCULAR; INTRAVENOUS
Status: DISCONTINUED | OUTPATIENT
Start: 2018-08-07 | End: 2018-08-07 | Stop reason: HOSPADM

## 2018-08-07 RX ORDER — OXYCODONE AND ACETAMINOPHEN 7.5; 325 MG/1; MG/1
1 TABLET ORAL ONCE AS NEEDED
Status: COMPLETED | OUTPATIENT
Start: 2018-08-07 | End: 2018-08-07

## 2018-08-07 RX ORDER — CEFAZOLIN SODIUM 2 G/100ML
2 INJECTION, SOLUTION INTRAVENOUS ONCE
Status: COMPLETED | OUTPATIENT
Start: 2018-08-07 | End: 2018-08-07

## 2018-08-07 RX ORDER — ACETAMINOPHEN 325 MG/1
650 TABLET ORAL ONCE AS NEEDED
Status: DISCONTINUED | OUTPATIENT
Start: 2018-08-07 | End: 2018-08-07 | Stop reason: HOSPADM

## 2018-08-07 RX ORDER — DIPHENHYDRAMINE HYDROCHLORIDE 50 MG/ML
12.5 INJECTION INTRAMUSCULAR; INTRAVENOUS
Status: DISCONTINUED | OUTPATIENT
Start: 2018-08-07 | End: 2018-08-07 | Stop reason: HOSPADM

## 2018-08-07 RX ORDER — ONDANSETRON 2 MG/ML
INJECTION INTRAMUSCULAR; INTRAVENOUS AS NEEDED
Status: DISCONTINUED | OUTPATIENT
Start: 2018-08-07 | End: 2018-08-07 | Stop reason: SURG

## 2018-08-07 RX ORDER — PROMETHAZINE HYDROCHLORIDE 25 MG/1
25 SUPPOSITORY RECTAL ONCE AS NEEDED
Status: DISCONTINUED | OUTPATIENT
Start: 2018-08-07 | End: 2018-08-07 | Stop reason: HOSPADM

## 2018-08-07 RX ORDER — HYDROMORPHONE HYDROCHLORIDE 1 MG/ML
0.5 INJECTION, SOLUTION INTRAMUSCULAR; INTRAVENOUS; SUBCUTANEOUS
Status: DISCONTINUED | OUTPATIENT
Start: 2018-08-07 | End: 2018-08-07 | Stop reason: HOSPADM

## 2018-08-07 RX ORDER — MIDAZOLAM HYDROCHLORIDE 1 MG/ML
1 INJECTION INTRAMUSCULAR; INTRAVENOUS
Status: DISCONTINUED | OUTPATIENT
Start: 2018-08-07 | End: 2018-08-07 | Stop reason: HOSPADM

## 2018-08-07 RX ORDER — KETOROLAC TROMETHAMINE 30 MG/ML
INJECTION, SOLUTION INTRAMUSCULAR; INTRAVENOUS AS NEEDED
Status: DISCONTINUED | OUTPATIENT
Start: 2018-08-07 | End: 2018-08-07 | Stop reason: SURG

## 2018-08-07 RX ORDER — HYDROMORPHONE HCL 110MG/55ML
PATIENT CONTROLLED ANALGESIA SYRINGE INTRAVENOUS AS NEEDED
Status: DISCONTINUED | OUTPATIENT
Start: 2018-08-07 | End: 2018-08-07 | Stop reason: SURG

## 2018-08-07 RX ORDER — FENTANYL CITRATE 50 UG/ML
50 INJECTION, SOLUTION INTRAMUSCULAR; INTRAVENOUS
Status: DISCONTINUED | OUTPATIENT
Start: 2018-08-07 | End: 2018-08-07 | Stop reason: HOSPADM

## 2018-08-07 RX ORDER — FAMOTIDINE 10 MG/ML
20 INJECTION, SOLUTION INTRAVENOUS ONCE
Status: COMPLETED | OUTPATIENT
Start: 2018-08-07 | End: 2018-08-07

## 2018-08-07 RX ORDER — FENTANYL CITRATE 50 UG/ML
INJECTION, SOLUTION INTRAMUSCULAR; INTRAVENOUS AS NEEDED
Status: DISCONTINUED | OUTPATIENT
Start: 2018-08-07 | End: 2018-08-07 | Stop reason: SURG

## 2018-08-07 RX ORDER — LABETALOL HYDROCHLORIDE 5 MG/ML
5 INJECTION, SOLUTION INTRAVENOUS
Status: DISCONTINUED | OUTPATIENT
Start: 2018-08-07 | End: 2018-08-07 | Stop reason: HOSPADM

## 2018-08-07 RX ORDER — DEXAMETHASONE SODIUM PHOSPHATE 10 MG/ML
INJECTION INTRAMUSCULAR; INTRAVENOUS AS NEEDED
Status: DISCONTINUED | OUTPATIENT
Start: 2018-08-07 | End: 2018-08-07 | Stop reason: SURG

## 2018-08-07 RX ORDER — MIDAZOLAM HYDROCHLORIDE 1 MG/ML
2 INJECTION INTRAMUSCULAR; INTRAVENOUS
Status: DISCONTINUED | OUTPATIENT
Start: 2018-08-07 | End: 2018-08-07 | Stop reason: HOSPADM

## 2018-08-07 RX ORDER — SODIUM CHLORIDE, SODIUM LACTATE, POTASSIUM CHLORIDE, CALCIUM CHLORIDE 600; 310; 30; 20 MG/100ML; MG/100ML; MG/100ML; MG/100ML
9 INJECTION, SOLUTION INTRAVENOUS CONTINUOUS
Status: DISCONTINUED | OUTPATIENT
Start: 2018-08-07 | End: 2018-08-07 | Stop reason: HOSPADM

## 2018-08-07 RX ORDER — ALBUTEROL SULFATE 2.5 MG/3ML
2.5 SOLUTION RESPIRATORY (INHALATION) ONCE AS NEEDED
Status: DISCONTINUED | OUTPATIENT
Start: 2018-08-07 | End: 2018-08-07 | Stop reason: HOSPADM

## 2018-08-07 RX ORDER — FLUMAZENIL 0.1 MG/ML
0.2 INJECTION INTRAVENOUS AS NEEDED
Status: DISCONTINUED | OUTPATIENT
Start: 2018-08-07 | End: 2018-08-07 | Stop reason: HOSPADM

## 2018-08-07 RX ORDER — NALOXONE HCL 0.4 MG/ML
0.2 VIAL (ML) INJECTION AS NEEDED
Status: DISCONTINUED | OUTPATIENT
Start: 2018-08-07 | End: 2018-08-07 | Stop reason: HOSPADM

## 2018-08-07 RX ORDER — IBUPROFEN 600 MG/1
600 TABLET ORAL EVERY 6 HOURS PRN
Qty: 60 TABLET | Refills: 3 | Status: SHIPPED | OUTPATIENT
Start: 2018-08-07 | End: 2018-12-13

## 2018-08-07 RX ADMIN — ONDANSETRON 4 MG: 2 INJECTION INTRAMUSCULAR; INTRAVENOUS at 15:06

## 2018-08-07 RX ADMIN — FENTANYL CITRATE 25 MCG: 50 INJECTION INTRAMUSCULAR; INTRAVENOUS at 10:18

## 2018-08-07 RX ADMIN — CEFAZOLIN SODIUM 2 G: 2 INJECTION, SOLUTION INTRAVENOUS at 09:20

## 2018-08-07 RX ADMIN — DEXAMETHASONE SODIUM PHOSPHATE 6 MG: 10 INJECTION INTRAMUSCULAR; INTRAVENOUS at 09:26

## 2018-08-07 RX ADMIN — FENTANYL CITRATE 25 MCG: 50 INJECTION INTRAMUSCULAR; INTRAVENOUS at 09:52

## 2018-08-07 RX ADMIN — SCOPOLAMINE 1 PATCH: 1 PATCH, EXTENDED RELEASE TRANSDERMAL at 07:52

## 2018-08-07 RX ADMIN — SODIUM CHLORIDE, POTASSIUM CHLORIDE, SODIUM LACTATE AND CALCIUM CHLORIDE 9 ML/HR: 600; 310; 30; 20 INJECTION, SOLUTION INTRAVENOUS at 07:02

## 2018-08-07 RX ADMIN — FAMOTIDINE 20 MG: 10 INJECTION, SOLUTION INTRAVENOUS at 07:53

## 2018-08-07 RX ADMIN — FENTANYL CITRATE 50 MCG: 50 INJECTION INTRAMUSCULAR; INTRAVENOUS at 11:58

## 2018-08-07 RX ADMIN — FENTANYL CITRATE 50 MCG: 50 INJECTION INTRAMUSCULAR; INTRAVENOUS at 09:12

## 2018-08-07 RX ADMIN — FENTANYL CITRATE 25 MCG: 50 INJECTION INTRAMUSCULAR; INTRAVENOUS at 09:55

## 2018-08-07 RX ADMIN — LIDOCAINE HYDROCHLORIDE 100 MG: 20 INJECTION, SOLUTION INFILTRATION; PERINEURAL at 09:14

## 2018-08-07 RX ADMIN — SODIUM CHLORIDE, POTASSIUM CHLORIDE, SODIUM LACTATE AND CALCIUM CHLORIDE: 600; 310; 30; 20 INJECTION, SOLUTION INTRAVENOUS at 09:52

## 2018-08-07 RX ADMIN — HYDROMORPHONE HYDROCHLORIDE 0.5 MG: 2 INJECTION INTRAMUSCULAR; INTRAVENOUS; SUBCUTANEOUS at 10:55

## 2018-08-07 RX ADMIN — FENTANYL CITRATE 25 MCG: 50 INJECTION INTRAMUSCULAR; INTRAVENOUS at 10:21

## 2018-08-07 RX ADMIN — KETOROLAC TROMETHAMINE 30 MG: 30 INJECTION, SOLUTION INTRAMUSCULAR; INTRAVENOUS at 10:50

## 2018-08-07 RX ADMIN — PROPOFOL 200 MG: 10 INJECTION, EMULSION INTRAVENOUS at 09:14

## 2018-08-07 RX ADMIN — OXYCODONE HYDROCHLORIDE AND ACETAMINOPHEN 1 TABLET: 7.5; 325 TABLET ORAL at 12:37

## 2018-08-07 RX ADMIN — MIDAZOLAM 2 MG: 1 INJECTION INTRAMUSCULAR; INTRAVENOUS at 07:53

## 2018-08-07 RX ADMIN — ONDANSETRON 4 MG: 2 INJECTION INTRAMUSCULAR; INTRAVENOUS at 10:50

## 2018-08-07 NOTE — INTERVAL H&P NOTE
No changes in med/surg history since last seen in clinic.  Denies chest pain, shortness of breath, fevers, chills, infections.  Plan for posterior repair, TVT, cystosocopy.    Carito Cason MD, PGY-5  Fellow, Female Pelvic Medicine & Reconstructive Surgery  [facility]

## 2018-08-07 NOTE — ANESTHESIA POSTPROCEDURE EVALUATION
Patient: Marguerite Abarca    Procedure Summary     Date:  08/07/18 Room / Location:  [facility] OR  / [facility] MAIN OR    Anesthesia Start:  0909 Anesthesia Stop:  1112    Procedure:  POSTERIOR VAGINAL REPAIR MID URETHRAL SLING CYSTOSCOPY (N/A Vagina) Diagnosis:      Surgeon:  David Pelayo MD Provider:  Blaise Mann MD    Anesthesia Type:  general ASA Status:  2          Anesthesia Type: general  Last vitals  BP   123/74 (08/07/18 1125)   Temp   36.4 °C (97.5 °F) (08/07/18 1108)   Pulse   64 (08/07/18 1125)   Resp   16 (08/07/18 1125)     SpO2   97 % (08/07/18 1125)     Post Anesthesia Care and Evaluation    Patient location during evaluation: PACU  Patient participation: complete - patient participated  Level of consciousness: awake and alert  Pain management: adequate  Airway patency: patent  Anesthetic complications: No anesthetic complications    Cardiovascular status: acceptable  Respiratory status: acceptable  Hydration status: acceptable    Comments: --------------------            08/07/18 1125     --------------------   BP:       123/74     Pulse:      64       Resp:       16       Temp:                SpO2:      97%      --------------------

## 2018-08-07 NOTE — ANESTHESIA PROCEDURE NOTES
Airway  Urgency: elective    Airway not difficult    General Information and Staff    Patient location during procedure: OR  Anesthesiologist: RJ LUNDY  CRNA: HARLEY LOPES    Indications and Patient Condition  Indications for airway management: airway protection    Preoxygenated: yes (Pt pre-O2 with 100% O2)  MILS not maintained throughout  Mask difficulty assessment: 0 - not attempted    Final Airway Details  Final airway type: supraglottic airway      Successful airway: classic  Size 4    Number of attempts at approach: 1    Additional Comments  ATOLMA x1. No change in dentition. + ETCO2. Airway seal pressure <20cm H2O.

## 2018-08-07 NOTE — DISCHARGE INSTRUCTIONS
"You were given Roxicet (pain pills) today at 12:37 pm.  You may have the next dose at 6:37 pm today 8/7/18.        Vaginal Surgery for Pelvic Organ Prolapse      Vaginal surgery is surgery to treat falling down or protruding/bulging vaginal walls. You will have incisions in your vagina.  If you had a uterus (womb) before the surgery, this surgery also usually involves a hysterectomy (removal of the uterus and possibly tubes or ovaries). All the sutures are closed with absorbably sutures; you should expect light vaginal spotting or discharge off and on for 2-4 weeks.      RISKS AND POSSIBLE COMPLICATIONS   Most women feel like they returned to \"normal\" in 2-4 weeks.      However, as with any procedure, complications can occur. Possible complications include:  • Bleeding, particularly in the urine or from vagina. It is normal to have blood in the urine (pink urine) the first few times you void after the surgery, but if this persists or urine is thick and bright red please contact your doctor.  • Bleeding from vagina: If you have very heavy bleeding (soaking >1 pad per hour or \"gushing blood\"), please contact your doctor immediately. Light bleeding or spotting is very normal.  • Urinary tract infection. If you have burning in the urine after the first day, fever, pain in the lower abdomen not helped by pain medication, please call your doctor  • Injury to the bladder, urethra, or surrounding organs. This is very unlikely, and your doctor will tell you if there are any special precautions you need to take  •Problems related to the use of anesthetics. The most common are nausea or constipation. It may take up to 3-5 days to have a bowel movement after surgery. You can take colace (over the counter stool softener) 100 mg once or twice a day to help stool be softer, or even take miralax 17 grams a night to help with bowel movements. We recommend a high fiber diet and/or probiotics to help your guts be healthy after " surgery.  • You may have difficulty urinating after surgery. The nurses in the recovery area will do a test to see if you can empty your bladder after surgery. One in 3 women need to use a catheter (Stanford) in the bladder at home for 3-5 days after the procedure, and your nurse/doctor will let you know if you need this and how to use it. If you go home with a catheter, you doctor will make an appointment for you in the clinic in 3-5 days to remove the catheter  • You may have leaking of urine. No procedure to help with pelvic floor problems is 100% in solving all leakage issues, and your doctor would have let you know before the surgery the chances of being dry or having leakage after your prolapse surgery.  Some women also have a surgery for leakage (sling surgery) at the time of their prolapse surgery; you should know from your plan with your doctor whether or not this was planned.    PROCEDURE  • You will be given a medicine that makes you go to sleep (general anesthetic) or a medicine injected into your spine that numbs your body below the waist (spinal anesthetic).  • The surgeon will operate through the vagina to suspend/lift the vagina and attach it to natural ligaments in your pelvis.  The doctor may also have removed your uterus (done a hysterectomy) if you have not had your uterus removed in a prior surgery.  • The surgeon uses a camera in the bladder (a cystoscope) to look inside your bladder and the tube you urinate from (the urethra).    AFTER THE PROCEDURE  • You should expect some vaginal bleeding or spotting, like the light day of a period, and this may come and go for a few weeks after the surgery. If you have very heavy bleeding (like discussed above) please call your doctor.   • You will be taken to a recovery area where your progress will be monitored. Once you are awake and stable, you will likely be moved to a regular hospital room.  • You will have some pain after the surgery, and different  women have different levels of pain. Your doctor will prescribe pain medication for use while you are in the hospital and for at home after the surgery. Your doctor and nurses will tell you what medicines you are to use and how to take them.  • You may walk around, climb stairs, eat regular food, shower, and do normal activities after this surgery. You will probably feel more tired for 1-2 weeks after the surgery due to the anesthesia. We do not recommend that you do very dangerous, high-impact, or aggressive physical activity (where there is a high risk of falling or straining) for 4-6 weeks after the surgery.  • You may return to sexual intercourse 6 weeks after this procedure.  • You may return to work or school 6 weeks after this procedure. If you and your doctor have not arranged or talked about this before the surgery, please talk to your doctor at your follow up clinic visit after surgery.  • You may drive again when no longer using narcotic pain medicine (medication with hydrocodone, oxycodone, morphine, codeine, etc.) and when you feel that you can comfortably make sudden movements that are required for safe driving. For most people, this is 1-2 weeks after surgery.  • We do not recommend that you leave town or travel long distances for 4-6 weeks after the surgery due to dangers associated with long car/plane trips (risk of blood clots in legs or lungs) or risk of not being able to access your doctor if there are issues related to the surgery. If you and your doctor are not already planning on your leaving town sooner than this, please discuss with your physician.  • You may have a thin, flexible tube (catheter) in your bladder to drain urine. This may stay in place until your bladder is working properly on its own. The catheter may be removed before you are discharged, or it may stay in place when you go home (see above).  • You may not have a bowel movement for 3-7 days after the surgery; this is normal.  If you are eating, holding down food, and passing gas from your bottom this is okay. If you are more than 5-7 days after the surgery and feel there is hard stool in your rectum, you may take miralax 17 grams per night or other over the counter laxatives like dulcolax 10 mg orally or in the bottom to help pass the bowel movement.  • You will follow up with your physician when planned (usually 6 weeks after the procedure or sooner if you left the hospital with a catheter in place). Please call the office of Dr. Anglin or Dr. Pelayo at [phone] or [phone] or [phone] to make an appointment, or if you have an urgent question or problem. If it is after hours (nighttime) or weekends, please call the answering service at [phone] to reach a physician that can help you and to get in touch with your doctor.            Scopolamine Patch  This patch has been applied to the skin behind one of your ears.  It may stay in place up to 24 hours. You may remove it at any time after your surgery; however, it should be removed after you are up and walking around the next day.  This medicine reduces stomach upset. Side effects may include: dry mouth, dizziness, sleepiness, constipation, or upset stomach.  An allergy would show up as: a rash, itching, wheezing or shortness of breath.  Follow these instructions:  1. Do not drink alcohol, drive or operate machinery while taking this medicine.  2. Wear only 1 patch at a time. You can leave the patch on for up to 24 hours.  3. When you remove the patch, fold it in half with the sticky sides together and throw it away. Wash your hands and the area under the patch.  4. Do not touch your eye with your hand if it has touched the patch.  5. Wash your hands well before and after touching the patch.  6. Sit or stand slowly to avoid dizziness.  Call your doctor if you have:  1. Any sign of allergy  2. No relief  3. Trouble passing urine  4. Any new or severe  symptoms

## 2018-08-07 NOTE — BRIEF OP NOTE
Brief operative note  Subjective     Date of Service:  08/07/18  Time of Service:  11:11 AM    Surgical Staff: Surgeon(s) and Role:     * David Pelayo MD - Primary   Additional Staff: Carito Cason MD, PGY-5   Pre-operative diagnosis(es): * Posterior wall prolapse, stress incontinence     Post-operative diagnosis(es): * Same, s/p below procedure   Procedure(s): Procedure(s):  POSTERIOR VAGINAL REPAIR MID URETHRAL SLING CYSTOSCOPY     Antibiotics: cefazolin (Ancef) ordered on call to OR     Anesthesia: Type: General  ASA:  II     Objective      Operative findings: Posterior wall prolapse   Specimens removed: None   Fluid Intake: 1600 mL   Output: Documented Output  Est. Blood Loss 50 mL  Urine Output 150 mL    I/O this shift:  In: 1600 [I.V.:1600]  Out: 50 [Blood:50]     Blood products used: No   Drains: Urethral Catheter Other (Comment);Silicone 16 Fr. (Active)      Implant Information:   Implant Name Type Inv. Item Serial No.  Lot No. LRB No. Used   SLNG MID URETH TRANSVAG ADV FIT - [id number] Implant SLNG MID URETH TRANSVAG ADV FIT   [employer] [id number] N/A 1      Complications: None   Intraoperative consult(s):   None   Condition: stable   Disposition: to PACU and then discharge to home         Carito Cason MD, PGY-5  Fellow, Female Pelvic Medicine & Reconstructive Surgery  [facility]

## 2018-08-07 NOTE — ANESTHESIA PREPROCEDURE EVALUATION
Anesthesia Evaluation     Patient summary reviewed and Nursing notes reviewed                Airway   Mallampati: II  Dental      Pulmonary - negative pulmonary ROS   Cardiovascular - negative cardio ROS    Rhythm: regular  Rate: normal        Neuro/Psych- negative ROS  GI/Hepatic/Renal/Endo    (+)  GERD,      Musculoskeletal     Abdominal    Substance History - negative use     OB/GYN negative ob/gyn ROS         Other   (+) arthritis                     Anesthesia Plan    ASA 2     general     intravenous induction   Anesthetic plan and risks discussed with patient.

## 2018-08-08 NOTE — BRIEF OP NOTE
Brief operative note  Subjective     Date of Service:  08/08/18  Time of Service:  10:53 AM    Surgical Staff: Surgeon(s) and Role:     * David Pelayo MD - Primary   Additional Staff: Carito Cason MD, PGY-5   Pre-operative diagnosis(es): * Posterior wall prolapse, stress incontinence     Post-operative diagnosis(es): * Same, s/p below procedure   Procedure(s): Procedure(s):  POSTERIOR VAGINAL REPAIR MID URETHRAL SLING CYSTOSCOPY     Antibiotics: cefazolin (Ancef) ordered on call to OR     Anesthesia: Type: General  ASA:  II     Objective      Operative findings: Posterior wall prolapse   Specimens removed: None   Fluid Intake: 1600 mL   Output: Documented Output  Est. Blood Loss 50 mL  Urine Output 150 mL    I/O this shift:  In: 2090 [P.O.:240; I.V.:1850]  Out: 627 [Urine:577; Blood:50]     Blood products used: No   Drains: Urethral Catheter Other (Comment);Silicone 16 Fr. (Active)      Implant Information:   Implant Name Type Inv. Item Serial No.  Lot No. LRB No. Used   SLNG MID URETH TRANSVAG ADV FIT - [id number] Implant SLNG MID URETH TRANSVAG ADV FIT   [employer] [id number] N/A 1      Complications: None   Intraoperative consult(s):   None   Condition: stable   Disposition: to PACU and then discharge to home       Procedure in detail:    Two Allis clamps were placed on the vaginal introitus at 5 and 7 o'clock positions. The mucosa was injected with epinephrine solution.   An incision was made at the vaginal introitus medial to the clamp and extending down the perineum to create an inverted triangle. An inverted triangular incision was excised from the posterior perineum and extend cephalad on the posterior vagina over the entire length of the rectocele defect.  The vaginal mucosa was dissected off the underlying muscularis layer in a lateral direction from the midline in a split thickness towards the iliococcygeus insertion of the posterior vaginal wall.  The fascia over  the levator muscles was reapproximated in the midline using #0 Vicryl sutures taking care not to reapproximate the bulk of the levator muscles. The vaginal rectovaginal muscularis was then plicated toward the midline to reduce the prolapse with interrupted 2-0 Vicryl sutures.  Excess vaginal mucosa was trimmed.  The vaginal mucosa was closed using #0 Vicryl suture in a running locking fashion. A crown stitch reapproximated the distal levator muscles using #0 Vicryl suture. The perineal body was then recreated by reapproximating the superficial transverse perineal, bulbocavernosus, and raphe of the external anal sphincter using  #1 Vicryl suture. The perineal skin was closed with 3-0 Vicryl in a running subcuticular fashion. Hemostasis was noted.    Attention was turned to the suprapubic region where after emptying the bladder, the space of Retzius was injected with epinephrine and normal saline solution bilaterally, after which, 2 Allis clamps were placed in the midurethra vaginally. Again, the space of Retzius was injected with epinephrine and normal saline transvaginally. A midline incision was made between the 2 Allis clamps through the mucosa and Metzenbaum scissors were used to dissect lateral to the urethra for approximately a 2 cm distance. At that point in this case, the [employer] Advantage Fit retropubic sling was used. A trocar was placed into the incision on the patient's right. A urethral guide was placed deviating the urethra away from the trocar. The trocar was passed vaginally, delivered suprapubically without any resistance. This was then repeated on the left side. The mesh was attached and cystoscopy was performed. Cystoscopy demonstrated no evidence of perforation or damage to the bladder and/or urethra. At that point, a large Tressa clamp was placed between the urethra and the mesh to ensure tension-free placement. The mesh was tightened around a Tressa clamp and the mesh was left in place  once the Tressa clamp could be removed and placed back into the space between the urethra and the mesh without difficulty. After removing the plastic sheaths, the excess mesh was trimmed. The vaginal mucosa was closed in 2 layers, 1st with a 2-0 Vicryl suture in a horizontal mattress fashion, followed by a running more superficial 2-0 Vicryl suture. The skin incisions were closed with 4-0 Monocryl after trimming the excess mesh. Careful attention was made not to touch the mesh once it was in place in order to avoid adjusting the tension-free placement. The Stanford catheter was replaced and anchored.    Sponge, lap, and needle counts were correct x2. The patient tolerated the procedure well, was taken to the recovery room in stable condition.    Dr. David Pelayo was scrubbed and present for the entire case.      Carito Cason MD, PGY-5  Fellow, Female Pelvic Medicine & Reconstructive Surgery  [facility]  I have reviewed the attached history and physical and there are no updates or changes to history and or physical.

## 2018-08-30 ENCOUNTER — OFFICE VISIT CONVERTED (OUTPATIENT)
Dept: FAMILY MEDICINE CLINIC | Age: 58
End: 2018-08-30
Attending: NURSE PRACTITIONER

## 2018-09-15 ENCOUNTER — OFFICE VISIT CONVERTED (OUTPATIENT)
Dept: FAMILY MEDICINE CLINIC | Age: 58
End: 2018-09-15
Attending: NURSE PRACTITIONER

## 2018-11-30 ENCOUNTER — OFFICE VISIT CONVERTED (OUTPATIENT)
Dept: FAMILY MEDICINE CLINIC | Age: 58
End: 2018-11-30
Attending: NURSE PRACTITIONER

## 2018-12-13 ENCOUNTER — APPOINTMENT (OUTPATIENT)
Dept: WOMENS IMAGING | Facility: HOSPITAL | Age: 58
End: 2018-12-13

## 2018-12-13 ENCOUNTER — OFFICE VISIT (OUTPATIENT)
Dept: OBSTETRICS AND GYNECOLOGY | Age: 58
End: 2018-12-13

## 2018-12-13 ENCOUNTER — PROCEDURE VISIT (OUTPATIENT)
Dept: OBSTETRICS AND GYNECOLOGY | Age: 58
End: 2018-12-13

## 2018-12-13 VITALS
BODY MASS INDEX: 28.17 KG/M2 | SYSTOLIC BLOOD PRESSURE: 150 MMHG | WEIGHT: 159 LBS | DIASTOLIC BLOOD PRESSURE: 90 MMHG | HEIGHT: 63 IN

## 2018-12-13 DIAGNOSIS — Z12.31 VISIT FOR SCREENING MAMMOGRAM: Primary | ICD-10-CM

## 2018-12-13 DIAGNOSIS — Z79.890 POSTMENOPAUSAL HRT (HORMONE REPLACEMENT THERAPY): ICD-10-CM

## 2018-12-13 DIAGNOSIS — Z12.4 SCREENING FOR MALIGNANT NEOPLASM OF CERVIX: ICD-10-CM

## 2018-12-13 DIAGNOSIS — Z78.0 MENOPAUSE: ICD-10-CM

## 2018-12-13 DIAGNOSIS — Z01.419 WELL WOMAN EXAM WITH ROUTINE GYNECOLOGICAL EXAM: Primary | ICD-10-CM

## 2018-12-13 DIAGNOSIS — Z13.89 SCREENING FOR BLOOD OR PROTEIN IN URINE: ICD-10-CM

## 2018-12-13 LAB
BILIRUB BLD-MCNC: NEGATIVE MG/DL
GLUCOSE UR STRIP-MCNC: NEGATIVE MG/DL
KETONES UR QL: NEGATIVE
LEUKOCYTE EST, POC: NEGATIVE
NITRITE UR-MCNC: NEGATIVE MG/ML
PH UR: 6 [PH] (ref 5–8)
PROT UR STRIP-MCNC: NEGATIVE MG/DL
RBC # UR STRIP: ABNORMAL /UL
SP GR UR: 1.02 (ref 1–1.03)
UROBILINOGEN UR QL: NORMAL

## 2018-12-13 PROCEDURE — 77067 SCR MAMMO BI INCL CAD: CPT | Performed by: RADIOLOGY

## 2018-12-13 PROCEDURE — 77067 SCR MAMMO BI INCL CAD: CPT | Performed by: OBSTETRICS & GYNECOLOGY

## 2018-12-13 PROCEDURE — 99396 PREV VISIT EST AGE 40-64: CPT | Performed by: OBSTETRICS & GYNECOLOGY

## 2018-12-13 PROCEDURE — 81002 URINALYSIS NONAUTO W/O SCOPE: CPT | Performed by: OBSTETRICS & GYNECOLOGY

## 2018-12-13 RX ORDER — ESTRADIOL 0.04 MG/D
1 FILM, EXTENDED RELEASE TRANSDERMAL 2 TIMES WEEKLY
Qty: 8 PATCH | Refills: 12 | Status: SHIPPED | OUTPATIENT
Start: 2018-12-13 | End: 2019-11-18 | Stop reason: SDUPTHER

## 2018-12-18 LAB
CYTOLOGIST CVX/VAG CYTO: NORMAL
CYTOLOGY CVX/VAG DOC THIN PREP: NORMAL
DX ICD CODE: NORMAL
HIV 1 & 2 AB SER-IMP: NORMAL
HPV I/H RISK 4 DNA CVX QL PROBE+SIG AMP: NEGATIVE
OTHER STN SPEC: NORMAL
PATH REPORT.FINAL DX SPEC: NORMAL
STAT OF ADQ CVX/VAG CYTO-IMP: NORMAL

## 2018-12-18 NOTE — PROGRESS NOTES
Normal Mammogram  . Breasts with scattered areas of fibroglandular density.  No suspicious findings or significant change.  BI-RADS 1. Repeat in one year.

## 2019-01-25 ENCOUNTER — HOSPITAL ENCOUNTER (OUTPATIENT)
Dept: OTHER | Facility: HOSPITAL | Age: 59
Discharge: HOME | End: 2019-01-25

## 2019-01-25 ENCOUNTER — OFFICE VISIT CONVERTED (OUTPATIENT)
Dept: FAMILY MEDICINE CLINIC | Age: 59
End: 2019-01-25
Attending: NURSE PRACTITIONER

## 2019-01-25 LAB
ALBUMIN SERPL-MCNC: 4 G/DL (ref 3.5–5)
ALBUMIN/GLOB SERPL: 1.4 {RATIO} (ref 1.4–2.6)
ALP SERPL-CCNC: 88 U/L (ref 53–141)
ALT SERPL-CCNC: 12 U/L (ref 10–40)
ANION GAP SERPL CALC-SCNC: 16 MMOL/L (ref 8–19)
AST SERPL-CCNC: 17 U/L (ref 15–50)
BASOPHILS # BLD MANUAL: 0.03 10*3/UL (ref 0–0.2)
BASOPHILS NFR BLD MANUAL: 0.6 % (ref 0–3)
BILIRUB SERPL-MCNC: 0.3 MG/DL (ref 0.2–1.3)
BUN SERPL-MCNC: 13 MG/DL (ref 5–25)
BUN/CREAT SERPL: 15 {RATIO} (ref 6–20)
CALCIUM SERPL-MCNC: 8.8 MG/DL (ref 8.7–10.4)
CHLORIDE SERPL-SCNC: 104 MMOL/L (ref 99–111)
CONV CO2: 24 MMOL/L (ref 22–32)
CONV TOTAL PROTEIN: 6.8 G/DL (ref 6.3–8.2)
CREAT UR-MCNC: 0.87 MG/DL (ref 0.5–0.9)
DEPRECATED RDW RBC AUTO: 47.1 FL
EOSINOPHIL # BLD MANUAL: 0.37 10*3/UL (ref 0–0.7)
EOSINOPHIL NFR BLD MANUAL: 6.9 % (ref 0–7)
ERYTHROCYTE [DISTWIDTH] IN BLOOD BY AUTOMATED COUNT: 13.2 % (ref 11.5–14.5)
GFR SERPLBLD BASED ON 1.73 SQ M-ARVRAT: >60 ML/MIN/{1.73_M2}
GLOBULIN UR ELPH-MCNC: 2.8 G/DL (ref 2–3.5)
GLUCOSE SERPL-MCNC: 87 MG/DL (ref 65–99)
GRANS (ABSOLUTE): 3.07 10*3/UL (ref 2–8)
GRANS: 57.3 % (ref 30–85)
HBA1C MFR BLD: 12.6 G/DL (ref 12–16)
HCT VFR BLD AUTO: 39.3 % (ref 37–47)
IMM GRANULOCYTES # BLD: 0.01 10*3/UL (ref 0–0.54)
IMM GRANULOCYTES NFR BLD: 0.2 % (ref 0–0.43)
LYMPHOCYTES # BLD MANUAL: 1.32 10*3/UL (ref 1–5)
LYMPHOCYTES NFR BLD MANUAL: 10.3 % (ref 3–10)
MCH RBC QN AUTO: 31.4 PG (ref 27–31)
MCHC RBC AUTO-ENTMCNC: 32.1 G/DL (ref 33–37)
MCV RBC AUTO: 98 FL (ref 81–99)
MONOCYTES # BLD AUTO: 0.55 10*3/UL (ref 0.2–1.2)
OSMOLALITY SERPL CALC.SUM OF ELEC: 289 MOSM/KG (ref 273–304)
PLATELET # BLD AUTO: 234 10*3/UL (ref 130–400)
PMV BLD AUTO: 9.3 FL (ref 7.4–10.4)
POTASSIUM SERPL-SCNC: 4.2 MMOL/L (ref 3.5–5.3)
RBC # BLD AUTO: 4.01 10*6/UL (ref 4.2–5.4)
SODIUM SERPL-SCNC: 140 MMOL/L (ref 135–147)
T4 FREE SERPL-MCNC: 1.3 NG/DL (ref 0.9–1.8)
TSH SERPL-ACNC: 7.23 M[IU]/L (ref 0.27–4.2)
VARIANT LYMPHS NFR BLD MANUAL: 24.7 % (ref 20–45)
WBC # BLD AUTO: 5.35 10*3/UL (ref 4.8–10.8)

## 2019-01-31 ENCOUNTER — CONVERSION ENCOUNTER (OUTPATIENT)
Dept: CARDIOLOGY | Facility: CLINIC | Age: 59
End: 2019-01-31
Attending: INTERNAL MEDICINE

## 2019-02-14 ENCOUNTER — OFFICE VISIT CONVERTED (OUTPATIENT)
Dept: FAMILY MEDICINE CLINIC | Age: 59
End: 2019-02-14
Attending: NURSE PRACTITIONER

## 2019-02-14 ENCOUNTER — HOSPITAL ENCOUNTER (OUTPATIENT)
Dept: OTHER | Facility: HOSPITAL | Age: 59
Discharge: HOME | End: 2019-02-14

## 2019-02-14 LAB — TSH SERPL-ACNC: 5.06 M[IU]/L (ref 0.27–4.2)

## 2019-03-22 ENCOUNTER — HOSPITAL ENCOUNTER (OUTPATIENT)
Dept: OTHER | Facility: HOSPITAL | Age: 59
Discharge: HOME | End: 2019-03-22

## 2019-03-22 LAB — TSH SERPL-ACNC: 3.96 M[IU]/L (ref 0.27–4.2)

## 2019-03-29 ENCOUNTER — CONVERSION ENCOUNTER (OUTPATIENT)
Dept: CARDIOLOGY | Facility: CLINIC | Age: 59
End: 2019-03-29
Attending: INTERNAL MEDICINE

## 2019-06-27 ENCOUNTER — CLINICAL SUPPORT (OUTPATIENT)
Dept: OBSTETRICS AND GYNECOLOGY | Age: 59
End: 2019-06-27

## 2019-06-27 ENCOUNTER — TELEPHONE (OUTPATIENT)
Dept: OBSTETRICS AND GYNECOLOGY | Age: 59
End: 2019-06-27

## 2019-06-27 DIAGNOSIS — R39.15 URINARY URGENCY: Primary | ICD-10-CM

## 2019-06-27 LAB
BILIRUB BLD-MCNC: NEGATIVE MG/DL
CLARITY, POC: CLEAR
COLOR UR: YELLOW
GLUCOSE UR STRIP-MCNC: NEGATIVE MG/DL
KETONES UR QL: NEGATIVE
LEUKOCYTE EST, POC: NEGATIVE
NITRITE UR-MCNC: NEGATIVE MG/ML
PH UR: 7 [PH] (ref 5–8)
PROT UR STRIP-MCNC: NEGATIVE MG/DL
RBC # UR STRIP: ABNORMAL /UL
SP GR UR: 1.02 (ref 1–1.03)
UROBILINOGEN UR QL: NORMAL

## 2019-06-27 PROCEDURE — 81003 URINALYSIS AUTO W/O SCOPE: CPT | Performed by: OBSTETRICS & GYNECOLOGY

## 2019-06-27 NOTE — TELEPHONE ENCOUNTER
Pt is c/o urgency. She feels like she has the urge to urinate all the time. When she goes to the bathroom she does feel like she is emptying all the way, but has the urge to go again soon after,What to do?

## 2019-06-29 LAB
BACTERIA UR CULT: ABNORMAL
BACTERIA UR CULT: ABNORMAL

## 2019-06-30 DIAGNOSIS — N30.00 ACUTE CYSTITIS WITHOUT HEMATURIA: Primary | ICD-10-CM

## 2019-06-30 RX ORDER — AMPICILLIN 500 MG/1
500 CAPSULE ORAL 4 TIMES DAILY
Qty: 28 CAPSULE | Refills: 0 | Status: SHIPPED | OUTPATIENT
Start: 2019-06-30 | End: 2020-02-19

## 2019-07-03 ENCOUNTER — TELEPHONE (OUTPATIENT)
Dept: OBSTETRICS AND GYNECOLOGY | Age: 59
End: 2019-07-03

## 2019-07-03 NOTE — TELEPHONE ENCOUNTER
I called and spoke to patient, she is taking the methotrexate for RA. She is not going to take the ampicillin. Per patient Dr. Lopez gave her samples of Myrbetriq and it has really helped. She will call the pharmacy and tell them she doesn't need the ampicillin.

## 2019-07-03 NOTE — TELEPHONE ENCOUNTER
Please call patient and tell her that apparently there is an interaction between methotrexate and ampicillin.  I do not see why she is on the methotrexate is just not in the chart.  Because it was a sub-clinical infection if she is feeling better she can stop the medicine.  If not I asked need to know why she is on methotrexate and would she be willing to stop for a week

## 2019-11-18 DIAGNOSIS — Z78.0 MENOPAUSE: ICD-10-CM

## 2019-11-18 RX ORDER — ESTRADIOL 0.04 MG/D
1 FILM, EXTENDED RELEASE TRANSDERMAL 2 TIMES WEEKLY
Qty: 8 PATCH | Refills: 12 | Status: SHIPPED | OUTPATIENT
Start: 2019-11-18 | End: 2019-12-30

## 2019-11-18 NOTE — TELEPHONE ENCOUNTER
(Jessica/Lester pt) Pt is needing a refill on her estradiol patches. Pt is Scheduled with you for December.     [phone]

## 2019-12-30 ENCOUNTER — OFFICE VISIT (OUTPATIENT)
Dept: OBSTETRICS AND GYNECOLOGY | Age: 59
End: 2019-12-30

## 2019-12-30 ENCOUNTER — PROCEDURE VISIT (OUTPATIENT)
Dept: OBSTETRICS AND GYNECOLOGY | Age: 59
End: 2019-12-30

## 2019-12-30 ENCOUNTER — APPOINTMENT (OUTPATIENT)
Dept: WOMENS IMAGING | Facility: HOSPITAL | Age: 59
End: 2019-12-30

## 2019-12-30 VITALS
WEIGHT: 163.2 LBS | SYSTOLIC BLOOD PRESSURE: 155 MMHG | BODY MASS INDEX: 28.92 KG/M2 | DIASTOLIC BLOOD PRESSURE: 80 MMHG | HEIGHT: 63 IN

## 2019-12-30 DIAGNOSIS — Z79.890 POSTMENOPAUSAL HRT (HORMONE REPLACEMENT THERAPY): ICD-10-CM

## 2019-12-30 DIAGNOSIS — Z79.890 POST-MENOPAUSE ON HRT (HORMONE REPLACEMENT THERAPY): ICD-10-CM

## 2019-12-30 DIAGNOSIS — Z12.31 VISIT FOR SCREENING MAMMOGRAM: Primary | ICD-10-CM

## 2019-12-30 DIAGNOSIS — N81.4 CYSTOCELE WITH PROLAPSE: Primary | ICD-10-CM

## 2019-12-30 PROCEDURE — 77067 SCR MAMMO BI INCL CAD: CPT | Performed by: OBSTETRICS & GYNECOLOGY

## 2019-12-30 PROCEDURE — 99396 PREV VISIT EST AGE 40-64: CPT | Performed by: OBSTETRICS & GYNECOLOGY

## 2019-12-30 PROCEDURE — 77067 SCR MAMMO BI INCL CAD: CPT | Performed by: RADIOLOGY

## 2019-12-30 PROCEDURE — 77063 BREAST TOMOSYNTHESIS BI: CPT | Performed by: RADIOLOGY

## 2019-12-30 NOTE — PROGRESS NOTES
Routine Annual Visit    2019    Patient: Marguerite Abarca          MR#:[id number]      Chief Complaint   Patient presents with   • Gynecologic Exam     New gyn, last pap 18 nml and HPV neg, Mg today, Colonoscopy 16, Dexa done, c/o bladder problems       History of Present Illness    59 y.o. female  who presents for annual exam.     She has a history of posterior repair and TVT with Dr. Foster in 2018 but has noted that something else has now fallen and has vaginal protrusion. Notes that it comes out when she lifts things, like gallons of milk at work. She has no urinary leakage, though.     Not really sexually active, says they both feel too old    Health Maintenance  Gardasil no  Last pap 2018 normal  Mammogram today  Colonoscopy 2016, every 5 yr repeat dad w colon cancer  PCP Dr. Alejandra    No LMP recorded. Patient has had a hysterectomy.  Obstetric History:  OB History        0    Para   0    Term   0       0    AB   0    Living   0       SAB   0    TAB   0    Ectopic   0    Molar        Multiple   0    Live Births                   Menstrual History:     No LMP recorded. Patient has had a hysterectomy.       ________________________________________  Patient Active Problem List   Diagnosis   • Post-menopause on HRT (hormone replacement therapy)   • Menopause   • GERD (gastroesophageal reflux disease)   • Vitamin D deficiency   • Goiter   • Uterine fibroid   • Postmenopausal HRT (hormone replacement therapy)   • Rheumatoid arthritis (CMS/HCC)   • Mixed stress and urge urinary incontinence   • Pain of upper abdomen   • Biliary dyskinesia       Past Medical History:   Diagnosis Date   • GERD (gastroesophageal reflux disease)    • Herpes genitalis 2018    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 Lesion in the right interlabial sulcus. Min symptoms, resolved in couple days.  No lymphadenopathy.  Probable recurrence of old infection, discussed IgG and IgM testing to determine if recent infection.   •  History of Papanicolaou smear of cervix      Pap Smear History: , , , , 96, , , , '02, '05, '07, '08, '10, , Normal Paps.  , Neg Pap, Neg HR-HPV.     • Incontinence of urine in female 2018    Resolved after surgery.   • Osteoporosis    • Postmenopausal HRT (hormone replacement therapy)    • Rheumatoid arthritis (CMS/[facility])     Dr Chava Espinal; Methotrexate,  Decreased relafen 2017.   • Right-sided nosebleed    • Urinary incontinence, mixed     Resolved after surgery.   • Vaginal prolapse    • Vitamin D deficiency        Family History   Problem Relation Age of Onset   • Valvular heart disease Father          of CHF.   • Colon cancer Father    • Colon polyps Sister    • COPD Mother    • No Known Problems Brother    • No Known Problems Maternal Grandmother    • No Known Problems Maternal Grandfather    • No Known Problems Paternal Grandmother    • No Known Problems Paternal Grandfather    • No Known Problems Maternal Aunt         x3   • No Known Problems Paternal Aunt         x3   • Kidney disease Maternal Uncle    • BRCA 1/2 Neg Hx    • Breast cancer Neg Hx    • Endometrial cancer Neg Hx    • Ovarian cancer Neg Hx    • Malig Hyperthermia Neg Hx        Past Surgical History:   Procedure Laterality Date   • ANTERIOR AND POSTERIOR VAGINAL REPAIR N/A 2018    Procedure: POSTERIOR VAGINAL REPAIR MID URETHRAL SLING CYSTOSCOPY;  Surgeon: David Pelayo MD;  [facility];  Service: Gynecology   • CHOLECYSTECTOMY WITH INTRAOPERATIVE CHOLANGIOGRAM N/A 2018    Procedure: CHOLECYSTECTOMY LAPAROSCOPIC ;  Surgeon: Silvano Rushing MD;  Location: [facility];  Service:    • COLONOSCOPY N/A 2016    Diverticulosis in the sigmoid colon, The examination was otherwise normal on direct and retoflexion views   • COLONOSCOPY N/A 2010    Diverticulosis sigmoid colon, otherwise normal-Dr. Arnold Mcneal   • COLONOSCOPY N/A 2007    One 7 mm polyp in the  distal sigmoid colon-Dr. Arnold Mcneal   • CYST REMOVAL Bilateral     Both wrists, benign.  Sounds like ganglion cyst.   • DIAGNOSTIC LAPAROSCOPY  1983    Exploratory Laparoscopy with myomectomy (twice) and cautery of endometriosis. The ovaries were preserved.   • ENDOSCOPY N/A 12/30/2017    Procedure: ESOPHAGOGASTRODUODENOSCOPY with biopsy; no H. pylori on biopsy.  Surgeon: Ignacio Rodriguez MD;  Location: [facility] ENDOSCOPY;  Service:    • EXCISION TUMOR NECK / THORAX  1987     Removal of Tumor from her back that was Benign, Lumbo-Sacral area.    • HEMORRHOIDECTOMY N/A 05/18/2010    w/ fissurectomy-Dr. Arnold Mcneal   • MYOMECTOMY  1995    Exploratory Laparoscopy with myomectomy (twice) and cautery of endometriosis. The ovaries were preserved.   • TOTAL ABDOMINAL HYSTERECTOMY WITH SALPINGO OOPHORECTOMY Bilateral 1999    Total Abdominal Hysterectomy with Removal of Both Tubes and Ovaries. for chronic pelvic pain and adhesions.   • VOCAL CORD BIOPSY Right 07/31/2003    Microlaryngoscopy, biopsy of right true cord-Dr. Vamshi Barron       Social History     Tobacco Use   Smoking Status Never Smoker   Smokeless Tobacco Never Used       has a current medication list which includes the following prescription(s): estradiol, folic acid, methotrexate, nabumetone, pantoprazole, ampicillin, and vitamin d.  ________________________________________    Current contraception: post menopausal status  History of abnormal Pap smear: no  Family history of Breast, ovarian, uterine, colon, pancreatic cancer: yes - colon ca  History of abnormal mammogram: no    The following portions of the patient's history were reviewed and updated as appropriate: allergies, current medications, past family history, past medical history, past social history, past surgical history and problem list.    Review of Systems   Constitutional: Negative for fever and unexpected weight change.   Respiratory: Negative for shortness of breath.    Cardiovascular:  "Negative for chest pain.   Gastrointestinal: Negative for abdominal pain, constipation and diarrhea.   Genitourinary: Positive for frequency. Negative for urgency.   Musculoskeletal: Positive for arthralgias and back pain.   Neurological: Negative for dizziness and light-headedness.   Hematological: Negative for adenopathy.   Psychiatric/Behavioral: Negative for dysphoric mood.       Objective   Physical Exam    /94   Ht 160 cm (63\")   Wt 74 kg (163 lb 3.2 oz)   Breastfeeding No   BMI 28.91 kg/m²    BP Readings from Last 3 Encounters:   12/30/19 166/94   12/13/18 150/90   08/07/18 149/84      Wt Readings from Last 3 Encounters:   12/30/19 74 kg (163 lb 3.2 oz)   12/13/18 72.1 kg (159 lb)   08/07/18 69.4 kg (153 lb)         BMI: Body mass index is 28.91 kg/m².       General:   alert, appears stated age and cooperative. Moves slowly during exam due to arthralgias   Heart:: regular rate and rhythm, S1, S2 normal, no murmur, click, rub or gallop   Lungs: normal respiratory effort and auscultation   Abdomen: soft, non-tender, without masses or organomegaly   Breast: inspection negative, no nipple discharge or bleeding, no masses or nodularity palpable   Urethra and bladder: urethral meatus normal; bladder nontender to palpation; no palpable sling   Vulva: normal, Bartholin's, Urethra, Beecher's normal   Vagina: normal mucosa, normal discharge, cystocele grade 2 present. no posterior prolapse   Cervix: absent   Uterus: absent    Adnexa: normal adnexa and no mass, fullness, tenderness       Assessment:    normal annual exam   Cystocele  Urinary urgency  Hypertension  HRT    Plan:    Plan     [x]  Mammogram request made  []  PAP done  []  Labs:   []  GC/Chl/TV  []  DEXA scan   []  Referral for colonoscopy:     Discussed that she does appear to have hypertension. Reviewed BP checks from prior visits at our office and others and is most always elevated. Her BP did improve upon recheck but I discussed with her that she " appears to have hypertension.    I reviewed that after age 60, risks of hormone replacement therapy including blood clot, stroke are increased.  She is interested in discontinuing estrogen therapy, and no refill was sent this visit.    I recommended returning to Dr. Pelayo for consideration of repair of her cystocele.     Counseling  [x]  Nutrition  [x]  Physical activity/regular exercise   [x]  Healthy weight  []  Injury prevention  []  Smoking cessation  []  Substance misuse/abuse  [x]  Sexual behavior  []  STD prevention  []  Contraception  []  Dental health  []  Mental health  []  Immunization  [x]  Encouraged SBE  Recommended calcium in diet, vitamin D supplement, and weightbearing exercise to help with bone strength     With her    Ynes Batista MD  12/30/2019  9:51 AM

## 2020-02-18 ENCOUNTER — TELEPHONE (OUTPATIENT)
Dept: OBSTETRICS AND GYNECOLOGY | Age: 60
End: 2020-02-18

## 2020-02-18 NOTE — TELEPHONE ENCOUNTER
Pt called requesting to switch providers from Dr. Batista to Dr. Melendez, advised patient we are not allow to schedule with a provider other than their own. Pt preferred to switch but will continue with Dr. Batista. [facility]    [phone]

## 2020-02-19 ENCOUNTER — APPOINTMENT (OUTPATIENT)
Dept: PREADMISSION TESTING | Facility: HOSPITAL | Age: 60
End: 2020-02-19

## 2020-02-19 VITALS
OXYGEN SATURATION: 96 % | WEIGHT: 164 LBS | HEIGHT: 62 IN | DIASTOLIC BLOOD PRESSURE: 86 MMHG | SYSTOLIC BLOOD PRESSURE: 179 MMHG | TEMPERATURE: 97.2 F | HEART RATE: 82 BPM | BODY MASS INDEX: 30.18 KG/M2 | RESPIRATION RATE: 18 BRPM

## 2020-02-19 LAB
ANION GAP SERPL CALCULATED.3IONS-SCNC: 13 MMOL/L (ref 5–15)
BUN BLD-MCNC: 13 MG/DL (ref 8–23)
BUN/CREAT SERPL: 17.1 (ref 7–25)
CALCIUM SPEC-SCNC: 9.4 MG/DL (ref 8.6–10.5)
CHLORIDE SERPL-SCNC: 103 MMOL/L (ref 98–107)
CO2 SERPL-SCNC: 26 MMOL/L (ref 22–29)
CREAT BLD-MCNC: 0.76 MG/DL (ref 0.57–1)
DEPRECATED RDW RBC AUTO: 43.5 FL (ref 37–54)
ERYTHROCYTE [DISTWIDTH] IN BLOOD BY AUTOMATED COUNT: 12.7 % (ref 12.3–15.4)
GFR SERPL CREATININE-BSD FRML MDRD: 77 ML/MIN/1.73
GLUCOSE BLD-MCNC: 97 MG/DL (ref 65–99)
HCT VFR BLD AUTO: 37.9 % (ref 34–46.6)
HGB BLD-MCNC: 12.5 G/DL (ref 12–15.9)
MCH RBC QN AUTO: 31.4 PG (ref 26.6–33)
MCHC RBC AUTO-ENTMCNC: 33 G/DL (ref 31.5–35.7)
MCV RBC AUTO: 95.2 FL (ref 79–97)
PLATELET # BLD AUTO: 224 10*3/MM3 (ref 140–450)
PMV BLD AUTO: 9.4 FL (ref 6–12)
POTASSIUM BLD-SCNC: 4.2 MMOL/L (ref 3.5–5.2)
RBC # BLD AUTO: 3.98 10*6/MM3 (ref 3.77–5.28)
SODIUM BLD-SCNC: 142 MMOL/L (ref 136–145)
WBC NRBC COR # BLD: 8.14 10*3/MM3 (ref 3.4–10.8)

## 2020-02-19 PROCEDURE — 80048 BASIC METABOLIC PNL TOTAL CA: CPT | Performed by: OBSTETRICS & GYNECOLOGY

## 2020-02-19 PROCEDURE — 93010 ELECTROCARDIOGRAM REPORT: CPT | Performed by: INTERNAL MEDICINE

## 2020-02-19 PROCEDURE — 85027 COMPLETE CBC AUTOMATED: CPT | Performed by: OBSTETRICS & GYNECOLOGY

## 2020-02-19 PROCEDURE — 93005 ELECTROCARDIOGRAM TRACING: CPT

## 2020-02-19 PROCEDURE — 36415 COLL VENOUS BLD VENIPUNCTURE: CPT

## 2020-02-19 RX ORDER — LEVOTHYROXINE SODIUM 0.03 MG/1
25 TABLET ORAL DAILY
COMMUNITY
End: 2021-01-04

## 2020-02-19 NOTE — DISCHARGE INSTRUCTIONS
Take the following medications the morning of surgery: NONE    ARRIVE AT 1130    General Instructions:  • Do not eat solid food after midnight the night before surgery.  • You may drink clear liquids day of surgery but must stop at least one hour before your hospital arrival time.  • It is beneficial for you to have a clear drink that contains carbohydrates the day of surgery.  We suggest a 12 to 20 ounce bottle of Gatorade or Powerade for non-diabetic patients or a 12 to 20 ounce bottle of G2 or Powerade Zero for diabetic patients. (Pediatric patients, are not advised to drink a 12 to 20 ounce carbohydrate drink)    Clear liquids are liquids you can see through.  Nothing red in color.     Plain water                               Sports drinks  Sodas                                   Gelatin (Jell-O)  Fruit juices without pulp such as white grape juice and apple juice  Popsicles that contain no fruit or yogurt  Tea or coffee (no cream or milk added)  Gatorade / Powerade  G2 / Powerade Zero    • Infants may have breast milk up to four hours before surgery.  • Infants drinking formula may drink formula up to six hours before surgery.   • Patients who avoid smoking, chewing tobacco and alcohol for 4 weeks prior to surgery have a reduced risk of post-operative complications.  Quit smoking as many days before surgery as you can.  • Do not smoke, use chewing tobacco or drink alcohol the day of surgery.   • If applicable bring your C-PAP/ BI-PAP machine.  • Bring any papers given to you in the doctor’s office.  • Wear clean comfortable clothes.  • Do not wear contact lenses, false eyelashes or make-up.  Bring a case for your glasses.   • Bring crutches or walker if applicable.  • Remove all piercings.  Leave jewelry and any other valuables at home.  • Hair extensions with metal clips must be removed prior to surgery.  • The Pre-Admission Testing nurse will instruct you to bring medications if unable to obtain an accurate  list in Pre-Admission Testing.            Preventing a Surgical Site Infection:  • For 2 to 3 days before surgery, avoid shaving with a razor because the razor can irritate skin and make it easier to develop an infection.    • Any areas of open skin can increase the risk of a post-operative wound infection by allowing bacteria to enter and travel throughout the body.  Notify your surgeon if you have any skin wounds / rashes even if it is not near the expected surgical site.  The area will need assessed to determine if surgery should be delayed until it is healed.  • The night prior to surgery sleep in a clean bed with clean clothing.  Do not allow pets to sleep with you.  • Shower on the morning of surgery using a fresh bar of anti-bacterial soap (such as Dial) and clean washcloth.  Dry with a clean towel and dress in clean clothing.  • Ask your surgeon if you will be receiving antibiotics prior to surgery.  • Make sure you, your family, and all healthcare providers clean their hands with soap and water or an alcohol based hand  before caring for you or your wound.    Day of surgery:  Your arrival time is approximately two hours before your scheduled surgery time.  Upon arrival, a Pre-op nurse and Anesthesiologist will review your health history, obtain vital signs, and answer questions you may have.  The only belongings needed at this time will be a list of your home medications and if applicable your C-PAP/BI-PAP machine.  If you are staying overnight your family can leave the rest of your belongings in the car and bring them to your room later.  A Pre-op nurse will start an IV and you may receive medication in preparation for surgery, including something to help you relax.  Your family will be able to see you in the Pre-op area.  Two visitors at a time will be allowed in the Pre-op room.  While you are in surgery your family should notify the waiting room  if they leave the waiting room area  and provide a contact phone number.    Please be aware that surgery does come with discomfort.  We want to make every effort to control your discomfort so please discuss any uncontrolled symptoms with your nurse.   Your doctor will most likely have prescribed pain medications.      If you are going home after surgery you will receive individualized written care instructions before being discharged.  A responsible adult must drive you to and from the hospital on the day of your surgery and stay with you for 24 hours.    If you are staying overnight following surgery, you will be transported to your hospital room following the recovery period.  [facility] has all private rooms.    If you have any questions please call Pre-Admission Testing at [phone].  Deductibles and co-payments are collected on the day of service. Please be prepared to pay the required co-pay, deductible or deposit on the day of service as defined by your plan.

## 2020-02-27 ENCOUNTER — HOSPITAL ENCOUNTER (OUTPATIENT)
Facility: HOSPITAL | Age: 60
Discharge: HOME | End: 2020-02-28
Attending: OBSTETRICS & GYNECOLOGY | Admitting: OBSTETRICS & GYNECOLOGY

## 2020-02-27 ENCOUNTER — ANESTHESIA (OUTPATIENT)
Dept: PERIOP | Facility: HOSPITAL | Age: 60
End: 2020-02-27

## 2020-02-27 ENCOUNTER — ANESTHESIA EVENT (OUTPATIENT)
Dept: PERIOP | Facility: HOSPITAL | Age: 60
End: 2020-02-27

## 2020-02-27 DIAGNOSIS — N99.3 VAGINAL VAULT PROLAPSE AFTER HYSTERECTOMY: Primary | ICD-10-CM

## 2020-02-27 PROBLEM — N81.9 VAGINAL VAULT PROLAPSE: Status: ACTIVE | Noted: 2020-02-27

## 2020-02-27 PROCEDURE — 25010000002 NEOSTIGMINE PER 0.5 MG: Performed by: NURSE ANESTHETIST, CERTIFIED REGISTERED

## 2020-02-27 PROCEDURE — 25010000002 EPINEPHRINE PER 0.1 MG: Performed by: OBSTETRICS & GYNECOLOGY

## 2020-02-27 PROCEDURE — 25010000002 FENTANYL CITRATE (PF) 100 MCG/2ML SOLUTION: Performed by: NURSE ANESTHETIST, CERTIFIED REGISTERED

## 2020-02-27 PROCEDURE — 25010000002 HYDROMORPHONE PER 4 MG: Performed by: NURSE ANESTHETIST, CERTIFIED REGISTERED

## 2020-02-27 PROCEDURE — G0378 HOSPITAL OBSERVATION PER HR: HCPCS

## 2020-02-27 PROCEDURE — 25010000002 PROPOFOL 10 MG/ML EMULSION: Performed by: NURSE ANESTHETIST, CERTIFIED REGISTERED

## 2020-02-27 PROCEDURE — 25010000003 CEFAZOLIN IN DEXTROSE 2-4 GM/100ML-% SOLUTION: Performed by: OBSTETRICS & GYNECOLOGY

## 2020-02-27 PROCEDURE — 25010000002 DEXAMETHASONE PER 1 MG: Performed by: NURSE ANESTHETIST, CERTIFIED REGISTERED

## 2020-02-27 PROCEDURE — 25010000002 ONDANSETRON PER 1 MG: Performed by: NURSE ANESTHETIST, CERTIFIED REGISTERED

## 2020-02-27 RX ORDER — HYDROMORPHONE HYDROCHLORIDE 1 MG/ML
0.5 INJECTION, SOLUTION INTRAMUSCULAR; INTRAVENOUS; SUBCUTANEOUS
Status: DISCONTINUED | OUTPATIENT
Start: 2020-02-27 | End: 2020-02-28 | Stop reason: HOSPADM

## 2020-02-27 RX ORDER — DIPHENHYDRAMINE HYDROCHLORIDE 50 MG/ML
12.5 INJECTION INTRAMUSCULAR; INTRAVENOUS
Status: DISCONTINUED | OUTPATIENT
Start: 2020-02-27 | End: 2020-02-27 | Stop reason: HOSPADM

## 2020-02-27 RX ORDER — ROCURONIUM BROMIDE 10 MG/ML
INJECTION, SOLUTION INTRAVENOUS AS NEEDED
Status: DISCONTINUED | OUTPATIENT
Start: 2020-02-27 | End: 2020-02-27 | Stop reason: SURG

## 2020-02-27 RX ORDER — SODIUM CHLORIDE 0.9 % (FLUSH) 0.9 %
10 SYRINGE (ML) INJECTION AS NEEDED
Status: DISCONTINUED | OUTPATIENT
Start: 2020-02-27 | End: 2020-02-27 | Stop reason: HOSPADM

## 2020-02-27 RX ORDER — HYDROMORPHONE HYDROCHLORIDE 1 MG/ML
0.5 INJECTION, SOLUTION INTRAMUSCULAR; INTRAVENOUS; SUBCUTANEOUS
Status: DISCONTINUED | OUTPATIENT
Start: 2020-02-27 | End: 2020-02-27 | Stop reason: HOSPADM

## 2020-02-27 RX ORDER — HYDROCODONE BITARTRATE AND ACETAMINOPHEN 7.5; 325 MG/1; MG/1
1 TABLET ORAL ONCE AS NEEDED
Status: DISCONTINUED | OUTPATIENT
Start: 2020-02-27 | End: 2020-02-27 | Stop reason: HOSPADM

## 2020-02-27 RX ORDER — DOCUSATE SODIUM 100 MG/1
100 CAPSULE, LIQUID FILLED ORAL 2 TIMES DAILY PRN
Status: DISCONTINUED | OUTPATIENT
Start: 2020-02-27 | End: 2020-02-28 | Stop reason: HOSPADM

## 2020-02-27 RX ORDER — FAMOTIDINE 10 MG/ML
20 INJECTION, SOLUTION INTRAVENOUS ONCE
Status: COMPLETED | OUTPATIENT
Start: 2020-02-27 | End: 2020-02-27

## 2020-02-27 RX ORDER — LIDOCAINE HYDROCHLORIDE 10 MG/ML
0.5 INJECTION, SOLUTION EPIDURAL; INFILTRATION; INTRACAUDAL; PERINEURAL ONCE AS NEEDED
Status: DISCONTINUED | OUTPATIENT
Start: 2020-02-27 | End: 2020-02-27 | Stop reason: HOSPADM

## 2020-02-27 RX ORDER — LABETALOL HYDROCHLORIDE 5 MG/ML
5 INJECTION, SOLUTION INTRAVENOUS
Status: DISCONTINUED | OUTPATIENT
Start: 2020-02-27 | End: 2020-02-27 | Stop reason: HOSPADM

## 2020-02-27 RX ORDER — FENTANYL CITRATE 50 UG/ML
INJECTION, SOLUTION INTRAMUSCULAR; INTRAVENOUS AS NEEDED
Status: DISCONTINUED | OUTPATIENT
Start: 2020-02-27 | End: 2020-02-27 | Stop reason: SURG

## 2020-02-27 RX ORDER — PANTOPRAZOLE SODIUM 40 MG/1
40 TABLET, DELAYED RELEASE ORAL
Status: DISCONTINUED | OUTPATIENT
Start: 2020-02-28 | End: 2020-02-28 | Stop reason: HOSPADM

## 2020-02-27 RX ORDER — SODIUM CHLORIDE 0.9 % (FLUSH) 0.9 %
3 SYRINGE (ML) INJECTION EVERY 12 HOURS SCHEDULED
Status: DISCONTINUED | OUTPATIENT
Start: 2020-02-27 | End: 2020-02-27 | Stop reason: HOSPADM

## 2020-02-27 RX ORDER — FENTANYL CITRATE 50 UG/ML
50 INJECTION, SOLUTION INTRAMUSCULAR; INTRAVENOUS
Status: DISCONTINUED | OUTPATIENT
Start: 2020-02-27 | End: 2020-02-27 | Stop reason: HOSPADM

## 2020-02-27 RX ORDER — PROMETHAZINE HYDROCHLORIDE 25 MG/ML
6.25 INJECTION, SOLUTION INTRAMUSCULAR; INTRAVENOUS
Status: DISCONTINUED | OUTPATIENT
Start: 2020-02-27 | End: 2020-02-27 | Stop reason: HOSPADM

## 2020-02-27 RX ORDER — SODIUM CHLORIDE 9 MG/ML
100 INJECTION, SOLUTION INTRAVENOUS CONTINUOUS
Status: DISCONTINUED | OUTPATIENT
Start: 2020-02-27 | End: 2020-02-28 | Stop reason: HOSPADM

## 2020-02-27 RX ORDER — NALOXONE HCL 0.4 MG/ML
0.1 VIAL (ML) INJECTION
Status: DISCONTINUED | OUTPATIENT
Start: 2020-02-27 | End: 2020-02-28 | Stop reason: HOSPADM

## 2020-02-27 RX ORDER — HYDRALAZINE HYDROCHLORIDE 20 MG/ML
5 INJECTION INTRAMUSCULAR; INTRAVENOUS
Status: DISCONTINUED | OUTPATIENT
Start: 2020-02-27 | End: 2020-02-27 | Stop reason: HOSPADM

## 2020-02-27 RX ORDER — PROMETHAZINE HYDROCHLORIDE 25 MG/1
25 SUPPOSITORY RECTAL ONCE AS NEEDED
Status: DISCONTINUED | OUTPATIENT
Start: 2020-02-27 | End: 2020-02-27 | Stop reason: HOSPADM

## 2020-02-27 RX ORDER — NAPROXEN 500 MG/1
500 TABLET ORAL 2 TIMES DAILY WITH MEALS
Status: DISCONTINUED | OUTPATIENT
Start: 2020-02-27 | End: 2020-02-28 | Stop reason: HOSPADM

## 2020-02-27 RX ORDER — CEFAZOLIN SODIUM 2 G/100ML
2 INJECTION, SOLUTION INTRAVENOUS ONCE
Status: COMPLETED | OUTPATIENT
Start: 2020-02-27 | End: 2020-02-27

## 2020-02-27 RX ORDER — ONDANSETRON 2 MG/ML
4 INJECTION INTRAMUSCULAR; INTRAVENOUS EVERY 6 HOURS PRN
Status: DISCONTINUED | OUTPATIENT
Start: 2020-02-27 | End: 2020-02-28 | Stop reason: HOSPADM

## 2020-02-27 RX ORDER — PROPOFOL 10 MG/ML
VIAL (ML) INTRAVENOUS AS NEEDED
Status: DISCONTINUED | OUTPATIENT
Start: 2020-02-27 | End: 2020-02-27 | Stop reason: SURG

## 2020-02-27 RX ORDER — ONDANSETRON 2 MG/ML
INJECTION INTRAMUSCULAR; INTRAVENOUS AS NEEDED
Status: DISCONTINUED | OUTPATIENT
Start: 2020-02-27 | End: 2020-02-27 | Stop reason: SURG

## 2020-02-27 RX ORDER — PROMETHAZINE HYDROCHLORIDE 25 MG/ML
12.5 INJECTION, SOLUTION INTRAMUSCULAR; INTRAVENOUS ONCE AS NEEDED
Status: DISCONTINUED | OUTPATIENT
Start: 2020-02-27 | End: 2020-02-27 | Stop reason: HOSPADM

## 2020-02-27 RX ORDER — GLYCOPYRROLATE 0.2 MG/ML
INJECTION INTRAMUSCULAR; INTRAVENOUS AS NEEDED
Status: DISCONTINUED | OUTPATIENT
Start: 2020-02-27 | End: 2020-02-27 | Stop reason: SURG

## 2020-02-27 RX ORDER — HYDROMORPHONE HCL 110MG/55ML
PATIENT CONTROLLED ANALGESIA SYRINGE INTRAVENOUS AS NEEDED
Status: DISCONTINUED | OUTPATIENT
Start: 2020-02-27 | End: 2020-02-27 | Stop reason: SURG

## 2020-02-27 RX ORDER — MIDAZOLAM HYDROCHLORIDE 1 MG/ML
2 INJECTION INTRAMUSCULAR; INTRAVENOUS
Status: DISCONTINUED | OUTPATIENT
Start: 2020-02-27 | End: 2020-02-27 | Stop reason: HOSPADM

## 2020-02-27 RX ORDER — ACETAMINOPHEN 325 MG/1
650 TABLET ORAL ONCE AS NEEDED
Status: DISCONTINUED | OUTPATIENT
Start: 2020-02-27 | End: 2020-02-27 | Stop reason: HOSPADM

## 2020-02-27 RX ORDER — DEXAMETHASONE SODIUM PHOSPHATE 10 MG/ML
INJECTION INTRAMUSCULAR; INTRAVENOUS AS NEEDED
Status: DISCONTINUED | OUTPATIENT
Start: 2020-02-27 | End: 2020-02-27 | Stop reason: SURG

## 2020-02-27 RX ORDER — MIDAZOLAM HYDROCHLORIDE 1 MG/ML
1 INJECTION INTRAMUSCULAR; INTRAVENOUS
Status: DISCONTINUED | OUTPATIENT
Start: 2020-02-27 | End: 2020-02-27 | Stop reason: HOSPADM

## 2020-02-27 RX ORDER — OXYCODONE HYDROCHLORIDE AND ACETAMINOPHEN 5; 325 MG/1; MG/1
1 TABLET ORAL EVERY 4 HOURS PRN
Status: DISCONTINUED | OUTPATIENT
Start: 2020-02-27 | End: 2020-02-28 | Stop reason: HOSPADM

## 2020-02-27 RX ORDER — OXYCODONE AND ACETAMINOPHEN 7.5; 325 MG/1; MG/1
1 TABLET ORAL ONCE AS NEEDED
Status: DISCONTINUED | OUTPATIENT
Start: 2020-02-27 | End: 2020-02-27 | Stop reason: HOSPADM

## 2020-02-27 RX ORDER — PROMETHAZINE HYDROCHLORIDE 25 MG/1
25 TABLET ORAL ONCE AS NEEDED
Status: DISCONTINUED | OUTPATIENT
Start: 2020-02-27 | End: 2020-02-27 | Stop reason: HOSPADM

## 2020-02-27 RX ORDER — DIPHENHYDRAMINE HCL 25 MG
25 CAPSULE ORAL
Status: DISCONTINUED | OUTPATIENT
Start: 2020-02-27 | End: 2020-02-27 | Stop reason: HOSPADM

## 2020-02-27 RX ORDER — SIMETHICONE 80 MG
80 TABLET,CHEWABLE ORAL 4 TIMES DAILY PRN
Status: DISCONTINUED | OUTPATIENT
Start: 2020-02-27 | End: 2020-02-28 | Stop reason: HOSPADM

## 2020-02-27 RX ORDER — FLUMAZENIL 0.1 MG/ML
0.2 INJECTION INTRAVENOUS AS NEEDED
Status: DISCONTINUED | OUTPATIENT
Start: 2020-02-27 | End: 2020-02-27 | Stop reason: HOSPADM

## 2020-02-27 RX ORDER — ONDANSETRON 4 MG/1
4 TABLET, FILM COATED ORAL EVERY 6 HOURS PRN
Status: DISCONTINUED | OUTPATIENT
Start: 2020-02-27 | End: 2020-02-28 | Stop reason: HOSPADM

## 2020-02-27 RX ORDER — LEVOTHYROXINE SODIUM 0.03 MG/1
25 TABLET ORAL DAILY
Status: DISCONTINUED | OUTPATIENT
Start: 2020-02-27 | End: 2020-02-28 | Stop reason: HOSPADM

## 2020-02-27 RX ORDER — SODIUM CHLORIDE, SODIUM LACTATE, POTASSIUM CHLORIDE, CALCIUM CHLORIDE 600; 310; 30; 20 MG/100ML; MG/100ML; MG/100ML; MG/100ML
9 INJECTION, SOLUTION INTRAVENOUS CONTINUOUS
Status: DISCONTINUED | OUTPATIENT
Start: 2020-02-27 | End: 2020-02-28 | Stop reason: HOSPADM

## 2020-02-27 RX ORDER — OXYCODONE AND ACETAMINOPHEN 10; 325 MG/1; MG/1
1 TABLET ORAL EVERY 4 HOURS PRN
Status: DISCONTINUED | OUTPATIENT
Start: 2020-02-27 | End: 2020-02-28 | Stop reason: HOSPADM

## 2020-02-27 RX ORDER — ONDANSETRON 2 MG/ML
4 INJECTION INTRAMUSCULAR; INTRAVENOUS ONCE AS NEEDED
Status: DISCONTINUED | OUTPATIENT
Start: 2020-02-27 | End: 2020-02-27 | Stop reason: HOSPADM

## 2020-02-27 RX ORDER — NALOXONE HCL 0.4 MG/ML
0.2 VIAL (ML) INJECTION AS NEEDED
Status: DISCONTINUED | OUTPATIENT
Start: 2020-02-27 | End: 2020-02-27 | Stop reason: HOSPADM

## 2020-02-27 RX ORDER — LIDOCAINE HYDROCHLORIDE 20 MG/ML
INJECTION, SOLUTION INFILTRATION; PERINEURAL AS NEEDED
Status: DISCONTINUED | OUTPATIENT
Start: 2020-02-27 | End: 2020-02-27 | Stop reason: SURG

## 2020-02-27 RX ORDER — EPHEDRINE SULFATE 50 MG/ML
5 INJECTION, SOLUTION INTRAVENOUS ONCE AS NEEDED
Status: DISCONTINUED | OUTPATIENT
Start: 2020-02-27 | End: 2020-02-27 | Stop reason: HOSPADM

## 2020-02-27 RX ORDER — SODIUM CHLORIDE 0.9 % (FLUSH) 0.9 %
3-10 SYRINGE (ML) INJECTION AS NEEDED
Status: DISCONTINUED | OUTPATIENT
Start: 2020-02-27 | End: 2020-02-27 | Stop reason: HOSPADM

## 2020-02-27 RX ADMIN — LIDOCAINE HYDROCHLORIDE 100 MG: 20 INJECTION, SOLUTION INFILTRATION; PERINEURAL at 13:39

## 2020-02-27 RX ADMIN — ROCURONIUM BROMIDE 40 MG: 10 INJECTION, SOLUTION INTRAVENOUS at 13:39

## 2020-02-27 RX ADMIN — FENTANYL CITRATE 50 MCG: 50 INJECTION, SOLUTION INTRAMUSCULAR; INTRAVENOUS at 16:20

## 2020-02-27 RX ADMIN — ONDANSETRON HYDROCHLORIDE 4 MG: 2 SOLUTION INTRAMUSCULAR; INTRAVENOUS at 15:28

## 2020-02-27 RX ADMIN — HYDROMORPHONE HYDROCHLORIDE 0.5 MG: 1 INJECTION, SOLUTION INTRAMUSCULAR; INTRAVENOUS; SUBCUTANEOUS at 16:35

## 2020-02-27 RX ADMIN — DEXAMETHASONE SODIUM PHOSPHATE 8 MG: 10 INJECTION INTRAMUSCULAR; INTRAVENOUS at 13:49

## 2020-02-27 RX ADMIN — CEFAZOLIN SODIUM 2 G: 2 INJECTION, SOLUTION INTRAVENOUS at 13:33

## 2020-02-27 RX ADMIN — GLYCOPYRROLATE 0.4 MG: 0.2 INJECTION INTRAMUSCULAR; INTRAVENOUS at 15:28

## 2020-02-27 RX ADMIN — OXYCODONE HYDROCHLORIDE AND ACETAMINOPHEN 1 TABLET: 10; 325 TABLET ORAL at 20:42

## 2020-02-27 RX ADMIN — HYDROMORPHONE HYDROCHLORIDE 0.5 MG: 2 INJECTION, SOLUTION INTRAMUSCULAR; INTRAVENOUS; SUBCUTANEOUS at 14:11

## 2020-02-27 RX ADMIN — DOCUSATE SODIUM 100 MG: 100 CAPSULE, LIQUID FILLED ORAL at 23:29

## 2020-02-27 RX ADMIN — HYDROMORPHONE HYDROCHLORIDE 0.5 MG: 2 INJECTION, SOLUTION INTRAMUSCULAR; INTRAVENOUS; SUBCUTANEOUS at 14:17

## 2020-02-27 RX ADMIN — SODIUM CHLORIDE 100 ML/HR: 9 INJECTION, SOLUTION INTRAVENOUS at 20:42

## 2020-02-27 RX ADMIN — SODIUM CHLORIDE, POTASSIUM CHLORIDE, SODIUM LACTATE AND CALCIUM CHLORIDE 9 ML/HR: 600; 310; 30; 20 INJECTION, SOLUTION INTRAVENOUS at 12:54

## 2020-02-27 RX ADMIN — FAMOTIDINE 20 MG: 10 INJECTION INTRAVENOUS at 12:54

## 2020-02-27 RX ADMIN — NEOSTIGMINE METHYLSULFATE 2 MG: 1 INJECTION INTRAMUSCULAR; INTRAVENOUS; SUBCUTANEOUS at 15:28

## 2020-02-27 RX ADMIN — SODIUM CHLORIDE, POTASSIUM CHLORIDE, SODIUM LACTATE AND CALCIUM CHLORIDE: 600; 310; 30; 20 INJECTION, SOLUTION INTRAVENOUS at 15:33

## 2020-02-27 RX ADMIN — PROPOFOL 200 MG: 10 INJECTION, EMULSION INTRAVENOUS at 13:39

## 2020-02-27 RX ADMIN — FENTANYL CITRATE 100 MCG: 50 INJECTION INTRAMUSCULAR; INTRAVENOUS at 13:35

## 2020-02-27 NOTE — ANESTHESIA PROCEDURE NOTES
Airway  Urgency: elective    Date/Time: 2/27/2020 1:42 PM  Airway not difficult    General Information and Staff    Patient location during procedure: OR  Anesthesiologist: Taz Escudero MD  CRNA: Jer Saldaña CRNA    Indications and Patient Condition  Indications for airway management: airway protection    Preoxygenated: yes  MILS maintained throughout  Mask difficulty assessment: 1 - vent by mask    Final Airway Details  Final airway type: endotracheal airway      Successful airway: ETT  Cuffed: yes   Successful intubation technique: direct laryngoscopy  Endotracheal tube insertion site: oral  Blade: Jeremy  Blade size: 3  ETT size (mm): 7.0  Cormack-Lehane Classification: grade I - full view of glottis  Placement verified by: chest auscultation and capnometry   Cuff volume (mL): 6  Measured from: lips  ETT/EBT  to lips (cm): 20  Number of attempts at approach: 1  Assessment: lips, teeth, and gum same as pre-op and atraumatic intubation

## 2020-02-27 NOTE — ANESTHESIA PREPROCEDURE EVALUATION
Anesthesia Evaluation     NPO Solid Status: > 8 hours             Airway   Mallampati: II  TM distance: >3 FB  Neck ROM: full  No difficulty expected  Dental - normal exam     Pulmonary - normal exam   (-) not a smoker  Cardiovascular - normal exam        Neuro/Psych  GI/Hepatic/Renal/Endo    (+)  GERD,      Musculoskeletal     Abdominal    Substance History      OB/GYN          Other   arthritis,        Other Comment: Rheumatoid arthritis on methotrexate                  Anesthesia Plan    ASA 3     general     intravenous induction     Anesthetic plan, all risks, benefits, and alternatives have been provided, discussed and informed consent has been obtained with: patient.       Problem: Adult Inpatient Plan of Care  Goal: Plan of Care Review  Outcome: Ongoing (interventions implemented as appropriate)  Arrived from home in stable condition.Report Dr Head started him on metolazone 5 mg daily about 2 weeks now.Also according to his scale he loss 18 lbs.Wt down 4.85 kg from 158 to 153.15 kg this visit.Orders given per D Rolling PA and carried out.IV started and labs sent.Lasix 80 mg ivp followed by lasix drip @20 cc/hr  x 6 hours.Notified PA of am labs,Cr+1.7 and K+3.4.Supplemented with Kcl 60 mEq po x 1 dose.Per instruction pt to call MD office and inform of Cr+ bump up 1.7 and Kcl 3.4 today which he spoke with staff.Awaiting MD return call.1100 pt requested stopping his lasix drip because he was very tired today.He also requested taking he off the schedule for 1 month per Dr Head ([facility]).Explained the importance of notifying  MD with any concerns/changes in health.If condition warrant get to ED.Verbalized understanding.Left floor in stable condition.Left floor ambulating in stable condition.Return about 1 month.

## 2020-02-28 VITALS
SYSTOLIC BLOOD PRESSURE: 151 MMHG | RESPIRATION RATE: 18 BRPM | BODY MASS INDEX: 30.19 KG/M2 | HEIGHT: 62 IN | HEART RATE: 86 BPM | TEMPERATURE: 97.8 F | WEIGHT: 164.06 LBS | OXYGEN SATURATION: 98 % | DIASTOLIC BLOOD PRESSURE: 80 MMHG

## 2020-02-28 RX ORDER — OXYCODONE HYDROCHLORIDE AND ACETAMINOPHEN 5; 325 MG/1; MG/1
1 TABLET ORAL EVERY 6 HOURS PRN
Qty: 20 TABLET | Refills: 0 | Status: SHIPPED | OUTPATIENT
Start: 2020-02-28 | End: 2020-03-08

## 2020-02-28 RX ORDER — VALACYCLOVIR HYDROCHLORIDE 1 G/1
1000 TABLET, FILM COATED ORAL 3 TIMES DAILY
Qty: 21 TABLET | Refills: 0 | Status: SHIPPED | OUTPATIENT
Start: 2020-02-28 | End: 2020-03-06

## 2020-02-28 RX ORDER — PSEUDOEPHEDRINE HCL 30 MG
100 TABLET ORAL 2 TIMES DAILY PRN
Qty: 30 EACH | Refills: 0 | Status: SHIPPED | OUTPATIENT
Start: 2020-02-28 | End: 2020-02-28

## 2020-02-28 RX ORDER — PSEUDOEPHEDRINE HCL 30 MG
100 TABLET ORAL 2 TIMES DAILY PRN
Qty: 30 EACH | Refills: 0 | Status: SHIPPED | OUTPATIENT
Start: 2020-02-28 | End: 2021-08-26

## 2020-02-28 RX ORDER — OXYCODONE HYDROCHLORIDE AND ACETAMINOPHEN 5; 325 MG/1; MG/1
1 TABLET ORAL EVERY 4 HOURS PRN
Qty: 20 TABLET | Refills: 0 | Status: SHIPPED | OUTPATIENT
Start: 2020-02-28 | End: 2020-02-28

## 2020-02-28 RX ADMIN — DOCUSATE SODIUM 100 MG: 100 CAPSULE, LIQUID FILLED ORAL at 09:35

## 2020-03-10 ENCOUNTER — OFFICE VISIT CONVERTED (OUTPATIENT)
Dept: FAMILY MEDICINE CLINIC | Age: 60
End: 2020-03-10
Attending: NURSE PRACTITIONER

## 2020-03-12 ENCOUNTER — TELEPHONE (OUTPATIENT)
Dept: OBSTETRICS AND GYNECOLOGY | Age: 60
End: 2020-03-12

## 2020-03-12 NOTE — TELEPHONE ENCOUNTER
Pt came into the office today requesting to switch providers.  informed her that we would give her a call back. I called pt back and informed her that as of right now, our office policy is that a patient cannot transfer providers within the office, that if she wished to transfer to a different provider it would have to be at a different facility. Explained to pt that is our policy only because if there were ever an emergency and Dr. Batista was the one that was on call, Dr. Batista would not be able to treat her. Pt asked if I asked Dr. Melendez about transferring to him and I told her that I hadn't because of our office wide policy. Pt was not happy with this and stated that she had to have surgery because Dr. Batista didn't treat her bladder issue. Pt states that she will talk to her sister and have her sister reach out to Dr. Melendez.      Pt was referred to Urogynecology by Dr. Lopez in May of 2018. Pt saw Dr. Pelayo and had a surgery with him in 2018. Pt came to see Dr. Batista in December of 2019 and Dr. Batista recommended that she see Dr. Pelayo again for prolapse.

## 2020-04-28 ENCOUNTER — HOSPITAL ENCOUNTER (OUTPATIENT)
Dept: OTHER | Facility: HOSPITAL | Age: 60
Discharge: HOME | End: 2020-04-28
Attending: NURSE PRACTITIONER

## 2020-04-28 ENCOUNTER — OFFICE VISIT CONVERTED (OUTPATIENT)
Dept: FAMILY MEDICINE CLINIC | Age: 60
End: 2020-04-28
Attending: NURSE PRACTITIONER

## 2020-04-29 LAB
T4 FREE SERPL-MCNC: 1.3 NG/DL (ref 0.9–1.8)
TSH SERPL-ACNC: 4.46 M[IU]/L (ref 0.27–4.2)

## 2020-06-10 ENCOUNTER — OFFICE VISIT CONVERTED (OUTPATIENT)
Dept: FAMILY MEDICINE CLINIC | Age: 60
End: 2020-06-10
Attending: NURSE PRACTITIONER

## 2020-06-10 ENCOUNTER — HOSPITAL ENCOUNTER (OUTPATIENT)
Dept: OTHER | Facility: HOSPITAL | Age: 60
Discharge: HOME | End: 2020-06-10
Attending: NURSE PRACTITIONER

## 2020-06-12 LAB
AMOXICILLIN+CLAV SUSC ISLT: <=2
AMPICILLIN SUSC ISLT: <=2
AMPICILLIN+SULBAC SUSC ISLT: <=2
BACTERIA UR CULT: ABNORMAL
CEFAZOLIN SUSC ISLT: <=4
CEFEPIME SUSC ISLT: <=1
CEFTAZIDIME SUSC ISLT: <=1
CEFTRIAXONE SUSC ISLT: <=1
CEFUROXIME ORAL SUSC ISLT: 4
CEFUROXIME PARENTER SUSC ISLT: 4
CIPROFLOXACIN SUSC ISLT: <=0.25
ERTAPENEM SUSC ISLT: <=0.5
GENTAMICIN SUSC ISLT: <=1
LEVOFLOXACIN SUSC ISLT: <=0.12
NITROFURANTOIN SUSC ISLT: <=16
TETRACYCLINE SUSC ISLT: <=1
TMP SMX SUSC ISLT: <=20
TOBRAMYCIN SUSC ISLT: <=1

## 2020-08-28 ENCOUNTER — TELEPHONE (OUTPATIENT)
Dept: OBSTETRICS AND GYNECOLOGY | Age: 60
End: 2020-08-28

## 2020-08-28 NOTE — TELEPHONE ENCOUNTER
I do not have openings this afternoon- she can schedule next week. I would advise to use warm compresses on it this weekend QID.

## 2020-08-28 NOTE — TELEPHONE ENCOUNTER
([facility] pt) Pt states she has a knot inside her vagina.  She has mild pain and it bled once.  Pt says it feels like something pulling inside.  Pt would like to be looked at today if possible.    [phone]

## 2020-08-31 ENCOUNTER — OFFICE VISIT (OUTPATIENT)
Dept: OBSTETRICS AND GYNECOLOGY | Age: 60
End: 2020-08-31

## 2020-08-31 VITALS
BODY MASS INDEX: 29.08 KG/M2 | DIASTOLIC BLOOD PRESSURE: 86 MMHG | SYSTOLIC BLOOD PRESSURE: 132 MMHG | HEIGHT: 62 IN | WEIGHT: 158 LBS

## 2020-08-31 DIAGNOSIS — N89.8 VAGINAL WALL CYST: Primary | ICD-10-CM

## 2020-08-31 PROCEDURE — 99213 OFFICE O/P EST LOW 20 MIN: CPT | Performed by: NURSE PRACTITIONER

## 2020-08-31 NOTE — PROGRESS NOTES
"Subjective   Marguerite Abarca is a 59 y.o. female is being seen today for   Chief Complaint   Patient presents with   • Gynecologic Exam     c/o knot in vaginal area, pt is concerned she has had bladder repair twice is concerned it could be her bladder again.   .    History of Present Illness     Previous patient of Dr Lopez- she would like to switch to Dr Melendez (her sister sees him for years)  Patient noticed a knot in her vaginal area last week.  She is unable to see it but can feel it if she puts her finger inside.  She also had a small amount of blood when wiping last week that has resolved.  She thinks she may have had some type of cyst  She denies any other symptoms but wanted to be checked out and make sure it wasn't anything to do with her bladder again  She has a history of TVT x 2, most recent surgery was in Feb of 2020  No abnormal discharge or pain currently  No vaginal bleeding  States she does have a history of herpes but only has outbreaks if she eats chocolate      The following portions of the patient's history were reviewed and updated as appropriate: allergies, current medications, past family history, past medical history, past social history, past surgical history and problem list.    /86   Ht 157.5 cm (62\")   Wt 71.7 kg (158 lb)   Breastfeeding No   BMI 28.90 kg/m²         Review of Systems   Constitutional: Negative.    HENT: Negative.    Eyes: Negative.    Respiratory: Negative.    Cardiovascular: Negative.    Gastrointestinal: Negative.    Endocrine: Negative.    Genitourinary: Positive for genital sores. Negative for difficulty urinating, vaginal discharge and vaginal pain.   Musculoskeletal: Negative.    Skin: Negative.    Allergic/Immunologic: Negative.    Neurological: Negative.    Hematological: Negative.    Psychiatric/Behavioral: Negative.        Objective   Physical Exam   Constitutional: She is oriented to person, place, and time. She appears well-developed and " well-nourished.   Genitourinary: Vagina normal. No tenderness or bleeding in the vagina. No signs of injury around the vagina. No vaginal discharge found.   Genitourinary Comments: I see no abnormal lesions or growths externally or internally, bladder is well supported      Neurological: She is alert and oriented to person, place, and time.   Skin: Skin is warm and dry.   Psychiatric: She has a normal mood and affect.         Assessment/Plan   Marguerite was seen today for gynecologic exam.    Diagnoses and all orders for this visit:    Vaginal wall cyst      Exam today is normal and patient denies any current symptoms or complaints  Based on her description I suspect she was feeling a vaginal wall cyst that has resolved or is in the process of resolving  She will monitor for any more symptoms and let us know if she has any concerns

## 2020-11-16 ENCOUNTER — OFFICE VISIT CONVERTED (OUTPATIENT)
Dept: FAMILY MEDICINE CLINIC | Age: 60
End: 2020-11-16
Attending: NURSE PRACTITIONER

## 2020-11-24 ENCOUNTER — OFFICE VISIT CONVERTED (OUTPATIENT)
Dept: FAMILY MEDICINE CLINIC | Age: 60
End: 2020-11-24

## 2020-11-24 ENCOUNTER — HOSPITAL ENCOUNTER (OUTPATIENT)
Dept: OTHER | Facility: HOSPITAL | Age: 60
Discharge: HOME | End: 2020-11-24
Attending: NURSE PRACTITIONER

## 2020-11-30 RX ORDER — ESTRADIOL 0.04 MG/D
FILM, EXTENDED RELEASE TRANSDERMAL
Qty: 24 EACH | Refills: 7 | Status: SHIPPED | OUTPATIENT
Start: 2020-11-30 | End: 2021-01-04 | Stop reason: SDUPTHER

## 2020-12-08 ENCOUNTER — OFFICE VISIT CONVERTED (OUTPATIENT)
Dept: FAMILY MEDICINE CLINIC | Age: 60
End: 2020-12-08

## 2021-01-04 ENCOUNTER — OFFICE VISIT (OUTPATIENT)
Dept: OBSTETRICS AND GYNECOLOGY | Age: 61
End: 2021-01-04

## 2021-01-04 ENCOUNTER — PROCEDURE VISIT (OUTPATIENT)
Dept: OBSTETRICS AND GYNECOLOGY | Age: 61
End: 2021-01-04

## 2021-01-04 ENCOUNTER — APPOINTMENT (OUTPATIENT)
Dept: WOMENS IMAGING | Facility: HOSPITAL | Age: 61
End: 2021-01-04

## 2021-01-04 VITALS
WEIGHT: 153 LBS | DIASTOLIC BLOOD PRESSURE: 80 MMHG | SYSTOLIC BLOOD PRESSURE: 140 MMHG | BODY MASS INDEX: 28.16 KG/M2 | HEIGHT: 62 IN

## 2021-01-04 DIAGNOSIS — Z79.890 POSTMENOPAUSAL HRT (HORMONE REPLACEMENT THERAPY): Primary | ICD-10-CM

## 2021-01-04 DIAGNOSIS — Z12.31 VISIT FOR SCREENING MAMMOGRAM: Primary | ICD-10-CM

## 2021-01-04 DIAGNOSIS — Z01.419 WELL WOMAN EXAM WITH ROUTINE GYNECOLOGICAL EXAM: ICD-10-CM

## 2021-01-04 PROCEDURE — 99396 PREV VISIT EST AGE 40-64: CPT | Performed by: OBSTETRICS & GYNECOLOGY

## 2021-01-04 PROCEDURE — 77067 SCR MAMMO BI INCL CAD: CPT | Performed by: OBSTETRICS & GYNECOLOGY

## 2021-01-04 PROCEDURE — 77067 SCR MAMMO BI INCL CAD: CPT | Performed by: RADIOLOGY

## 2021-01-04 RX ORDER — VALACYCLOVIR HYDROCHLORIDE 500 MG/1
500 TABLET, FILM COATED ORAL 2 TIMES DAILY PRN
COMMUNITY

## 2021-01-04 RX ORDER — ESTRADIOL 0.04 MG/D
1 FILM, EXTENDED RELEASE TRANSDERMAL 2 TIMES WEEKLY
Qty: 24 EACH | Refills: 3 | Status: SHIPPED | OUTPATIENT
Start: 2021-01-04 | End: 2021-12-02

## 2021-01-04 NOTE — PROGRESS NOTES
Routine Annual Visit    2021    Patient: Marguerite Abarca          MR#:[id number]      Chief Complaint   Patient presents with   • Gynecologic Exam     cc: annual visit today , mmg today at the office , last pap 18 neg , colonoscopy 2016 , TVT @ dr nath 2018 .        History of Present Illness    60 y.o. female  who presents for annual exam.     She had a sacrospinous suspension with Dr. Pelayo in February of this year. She had some leakage afterward, says that she reached into the vagina and removed a suture and has been dry since then, no incontinence except with very heavy lifting.  She has had an outbreak of what sounds like shingles on her left buttock and Dr. Pelayo gave her valtrex to take.    She had COVID in November, was very sick but did not want to go to the hospital.     She is retiring at the end of February. Has worked in a dairy.  Discussed staying active and exercising in [facility]    She sees Dr. Espinal for rheumatoid arthritis, is on Methotrexate and doing well    Health Maintenance  Last pap: 2018  Mammogram: today  Colonoscopy 2016, repeat due every 5 years, father colon cancer.  Her sister has a new GI doctor she wants her to go to for a colonoscopy, she plans to call and make appt  Family history of Breast, ovarian, uterine, colon, pancreatic cancer: yes - father colon cancer, sister has had polyps  DEXA: managed by Dr. Epsinal  PCP at [facility]?      No LMP recorded (lmp unknown). Patient has had a hysterectomy.  Obstetric History:  OB History        0    Para   0    Term   0       0    AB   0    Living   0       SAB   0    TAB   0    Ectopic   0    Molar        Multiple   0    Live Births                   Menstrual History:     No LMP recorded (lmp unknown). Patient has had a hysterectomy.       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 Lesion in the right interlabial sulcus. Min symptoms, resolved in couple days.  No lymphadenopathy.  Probable recurrence of old infection, discussed IgG and IgM testing to determine if recent infection.   • History of Papanicolaou smear of cervix 2018     Pap Smear History: , , , , , , , , , , , , '10, , Normal Paps.  , Neg Pap, Neg HR-HPV.     • Osteoporosis    • Rheumatoid arthritis (CMS/HCC)     Dr Chava Espinal; Methotrexate,  Decreased relafen 2017.   • Right-sided nosebleed    • Sinus drainage    • Urinary incontinence, mixed     Resolved after surgery.   • Vaginal prolapse    • Vitamin D deficiency        Family History   Problem Relation Age of Onset   • Valvular heart disease Father          of CHF.   • Colon cancer Father    • Colon polyps Sister    • COPD Mother    • No Known Problems Brother    • No Known Problems Maternal Grandmother    • No Known Problems Maternal Grandfather    • No Known Problems Paternal Grandmother    • No Known Problems Paternal Grandfather    • No Known Problems Maternal Aunt         x3   • No Known Problems Paternal Aunt         x3   • Kidney disease Maternal Uncle    • BRCA 1/2 Neg Hx    • Breast cancer Neg Hx    • Endometrial cancer Neg Hx    • Ovarian cancer Neg Hx    • Malig Hyperthermia Neg Hx    • Uterine cancer Neg Hx        Past Surgical History:   Procedure Laterality  Date   • ANTERIOR AND POSTERIOR VAGINAL REPAIR N/A 8/7/2018    Procedure: POSTERIOR VAGINAL REPAIR MID URETHRAL SLING CYSTOSCOPY;  Surgeon: David Pelayo MD;  Location: [facility] OR;  Service: Gynecology   • CHOLECYSTECTOMY WITH INTRAOPERATIVE CHOLANGIOGRAM N/A 2/16/2018    Procedure: CHOLECYSTECTOMY LAPAROSCOPIC ;  Surgeon: Silvano Rushing MD;  Location: [facility] OR O[facility];  Service:    • COLONOSCOPY N/A 8/25/2016    Diverticulosis in the sigmoid colon, The examination was otherwise normal on direct and retoflexion views   • COLONOSCOPY N/A 05/07/2010    Diverticulosis sigmoid colon, otherwise normal-Dr. Arnold Mcneal   • COLONOSCOPY N/A 05/04/2007    One 7 mm polyp in the distal sigmoid colon-Dr. Arnold Mcneal   • CYST REMOVAL Bilateral     Both wrists, benign.  Sounds like ganglion cyst.   • DIAGNOSTIC LAPAROSCOPY  1983    Exploratory Laparoscopy with myomectomy (twice) and cautery of endometriosis. The ovaries were preserved.   • ENDOSCOPY N/A 12/30/2017    Procedure: ESOPHAGOGASTRODUODENOSCOPY with biopsy; no H. pylori on biopsy.  Surgeon: Ignacio Rodriguez MD;  Location: [facility] ENDOSCOPY;  Service:    • EXCISION TUMOR NECK / THORAX  1987     Removal of Tumor from her back that was Benign, Lumbo-Sacral area.    • HEMORRHOIDECTOMY N/A 05/18/2010    w/ fissurectomy-Dr. Arnold Mcneal   • MYOMECTOMY  1995    Exploratory Laparoscopy with myomectomy (twice) and cautery of endometriosis. The ovaries were preserved.   • SACROSPINUS SUSPENSION N/A 2/27/2020    Procedure: SACROSPINUS LIGAMENT SUSPENSION REPAIR CYSTOSCOPY;  Surgeon: David Pelayo MD;  Location: [facility] OR;  Service: Gynecology;  Laterality: N/A;   • SINUS SURGERY     • TOTAL ABDOMINAL HYSTERECTOMY WITH SALPINGO OOPHORECTOMY Bilateral 1999    Total Abdominal Hysterectomy with Removal of Both Tubes and Ovaries. for chronic pelvic pain and adhesions.   • VOCAL CORD BIOPSY Right 07/31/2003    Microlaryngoscopy, biopsy of right true cord-Dr. Bonds  "[facility]       Social History     Tobacco Use   Smoking Status Never Smoker   Smokeless Tobacco Never Used       has a current medication list which includes the following prescription(s): docusate sodium, estradiol, folic acid, levothyroxine, methotrexate, nabumetone, pantoprazole, and vitamin d.  ________________________________________      Review of Systems   Constitutional: Negative for fever and unexpected weight change.   Respiratory: Positive for cough. Negative for shortness of breath.    Cardiovascular: Negative for chest pain.   Gastrointestinal: Negative for abdominal pain, constipation and diarrhea.   Genitourinary: Negative for frequency, urgency and vaginal bleeding.   Musculoskeletal: Positive for back pain.   Neurological: Negative for dizziness and headaches.   Hematological: Negative for adenopathy.   Psychiatric/Behavioral: Negative for dysphoric mood.       Objective   Physical Exam    Ht 157.5 cm (62\")   LMP  (LMP Unknown)   Breastfeeding No   BMI 28.90 kg/m²    BP Readings from Last 3 Encounters:   08/31/20 132/86   02/28/20 151/80   02/19/20 179/86      Wt Readings from Last 3 Encounters:   08/31/20 71.7 kg (158 lb)   02/27/20 74.4 kg (164 lb 1 oz)   02/19/20 74.4 kg (164 lb)         BMI: Body mass index is 28.9 kg/m².       General:   alert, appears stated age and cooperative   Neck: No thyromegaly or LAD, no carotid bruit noted   Heart:: regular rate and rhythm, S1, S2 normal, no murmur, click, rub or gallop   Lungs: normal respiratory effort and auscultation   Abdomen: soft, non-tender, without masses or organomegaly   Breast: inspection negative, no nipple discharge or bleeding, no masses or nodularity palpable   Urethra and bladder: urethral meatus normal; bladder nontender to palpation;   Vulva: normal, Bartholin's, Urethra, [place]'s normal   Vagina: normal mucosa, normal discharge, mild apical and anterior prolapse but just grade 1   Cervix: absent   Uterus: absent    Adnexa: normal " adnexa and no mass, fullness, tenderness       Assessment:    normal annual exam   Cystocele and apical prolapse s/p repair  HRT    Plan:    Plan     [x]  Mammogram request made  []  PAP done-plan none further  []  Labs:   []  GC/Chl/TV  []  DEXA scan   []  Referral for colonoscopy: She declines referral, she has a number from her sister of a new doctor that she wants to see for colonoscopy.  Encouraged her to call make appointment.  She cannot recall the name    She had previously discontinued hormone replacement therapy, but she had a recurrence of terrible hot flashes.  We reviewed risks including blood clot, stroke, acceleration of heart disease if already present.  She states that the hot flashes were debilitating, and she is very much interested in continuing on hormone replacement therapy.  Discussed that we will continue to monitor for any contraindications to therapy, her blood pressure today is within normal limits.  Refill sent to pharmacy.    Counseling  [x]  Nutrition  [x]  Physical activity/regular exercise   [x]  Healthy weight   Recommended adequate calcium and vitamin D intake  []  Injury prevention  []  Smoking cessation  []  Substance misuse/abuse  []  Sexual behavior  []  STD prevention  []  Contraception  []  Dental health  [x]  Mental health  [x]  Immunization encouraged flu shot  [x]  Encouraged SBE      Ynes Batista MD  01/04/2021  08:16 EST

## 2021-01-08 ENCOUNTER — OFFICE VISIT CONVERTED (OUTPATIENT)
Dept: FAMILY MEDICINE CLINIC | Age: 61
End: 2021-01-08
Attending: NURSE PRACTITIONER

## 2021-01-08 ENCOUNTER — HOSPITAL ENCOUNTER (OUTPATIENT)
Dept: OTHER | Facility: HOSPITAL | Age: 61
Discharge: HOME | End: 2021-01-08
Attending: NURSE PRACTITIONER

## 2021-05-15 VITALS
SYSTOLIC BLOOD PRESSURE: 161 MMHG | HEART RATE: 67 BPM | BODY MASS INDEX: 29.44 KG/M2 | DIASTOLIC BLOOD PRESSURE: 81 MMHG | HEIGHT: 62 IN | WEIGHT: 160 LBS

## 2021-05-18 NOTE — PROGRESS NOTES
Marguerite Abarca 1960     Office/Outpatient Visit    Visit Date:  03:37 pm    Provider: Dora Alejandra N.P. (Assistant: Susan Mcdaniel MA)    Location: [facility]        Electronically signed by Dora Alejandra N.P. on  2019 05:08:01 PM                             SUBJECTIVE:        CC:     Mrs. Abarca is a 58 year old White female.  presents today due to complaints of pain in left ear, cough and congestion X 2-3 weeks         HPI:         Patient to be evaluated for ear pain.      Pt states bilateral ear pain x 2-3 weeks. She was previously on cefdinir and then symptoms returned. States now with sinus pressure, and headaches. No meds taken.      ROS:     CONSTITUTIONAL:  Negative for chills, fatigue, fever, and weight change.      EYES:  Negative for blurred vision.      CARDIOVASCULAR:  Negative for chest pain, orthopnea, paroxysmal nocturnal dyspnea and pedal edema.      RESPIRATORY:  Negative for dyspnea.      GASTROINTESTINAL:  Negative for abdominal pain, constipation, diarrhea, nausea and vomiting.      NEUROLOGICAL:  Negative for dizziness, headaches, paresthesias, and weakness.      PSYCHIATRIC:  Negative for anxiety, depression, and sleep disturbances.          PM/[facility]/:     Last Reviewed on 9/15/2018 09:45 AM by Dora Alejandra    Past Medical History:                 PAST MEDICAL HISTORY         mild mitral regurgitation         GYNECOLOGICAL HISTORY:             CURRENT MEDICAL PROVIDERS:    Obstetrician/Gynecologist    Rheumatologist: Kylie         [facility] HEALTH MAINTENANCE             COLORECTAL CANCER SCREENING: Up to date (colonoscopy q10y; sigmoidoscopy q5y; Cologuard q3y) was last done 2018, Results are in chart; colonoscopy with the following abnormalities noted-- few diverticula     MAMMOGRAM: was last done 2016 with normal results     PAP SMEAR: was last done 2018 with normal results Dr. Lopez in [facility]          Surgical History:         Hysterectomy: WITH B.S.O.;;;     sinus surgery  per dr caballero; Procedures: colonoscopy ;;     Cholecystectomy: 2-16-18;         Family History:         Positive for PACEMAKER.      Positive for Colon Cancer.  Father:  at age 90;  Arrhythmia; Congestive Heart Failure;  Colon Cancer     Mother:  at age 68; Cause of death was COPD         Social History:     Occupation: [employer]     Marital Status:      Children: None         Tobacco/Alcohol/Supplements:     Last Reviewed on 2019 09:28 AM by Tressa Lindsay    Tobacco: She has never smoked.  Non-drinker     Caffeine:  She admits to consuming caffeine via coffee ( 1 serving per day ).          Substance Abuse History:     Last Reviewed on 9/15/2018 09:45 AM by Dora Alejandra        Mental Health History:     Last Reviewed on 9/15/2018 09:45 AM by Dora Alejandra        Communicable Diseases (eg STDs):     Last Reviewed on 9/15/2018 09:45 AM by Dora Alejandra            Current Problems:     Last Reviewed on 9/15/2018 09:45 AM by Dora Alejandra    Hypothyroidism     Bulging lumbar disc     Low back pain     Diverticulosis     Elevated blood pressure without a diagnosis of hypertension     Surgical menopause     Rheumatoid arthritis     Gastroesophageal reflux disease     Abnormal thyroid findings     Ear pain     Heart palpitation     Screening for depression         Immunizations:     None        Allergies:     Last Reviewed on 2019 09:28 AM by Terssa Lindsay      No Known Drug Allergies.         Current Medications:     Last Reviewed on 2019 09:28 AM by Tressa Lindsay    Minivelle 0.075mg Transdermal Patch Apply 1 patch(es) to lower abdomen 2 times weekly     Pantoprazole 40mg Tablets, Delayed Release 1 tab daily PRN     Folic Acid 1mg Tablets Take 1 tablet(s) by mouth daily     Cefdinir 300mg Capsules 1 bid for 10 days     Vitamin D2     Methotrexate Sodium 2.5mg Tablet 6 tabs  once weekly         OBJECTIVE:        Vitals:         Current: 2/14/2019 3:43:05 PM    Ht:  5 ft, 2.75 in;  Wt: 162.2 lbs;  BMI: 29.0    T: 97.9 F (oral);  BP: 175/95 mm Hg (left arm, sitting);  P: 76 bpm (left arm (BP Cuff), sitting);  sCr: 0.87 mg/dL;  GFR: 69.62        Exams:     PHYSICAL EXAM:     GENERAL: vital signs recorded - well developed, well nourished;  no apparent distress;     EYES: extraocular movements intact; conjunctiva and cornea are normal; PERRLA;     E/N/T: EARS: external auditory canal erythematous bilaterally;  NOSE: bilateral maxillary sinus tenderness present; OROPHARYNX: oral mucosa reveals erythema;     NECK: range of motion is normal; thyroid is non-palpable;     RESPIRATORY: normal respiratory rate and pattern with no distress; normal breath sounds with no rales, rhonchi, wheezes or rubs;     CARDIOVASCULAR: normal rate; rhythm is regular;  no systolic murmur; no edema;     GASTROINTESTINAL: nontender; normal bowel sounds; no organomegaly;     NEUROLOGICAL:  cranial nerves, motor and sensory function, reflexes, gait and coordination are all intact;     PSYCHIATRIC:  appropriate affect and demeanor; normal speech pattern; grossly normal memory;         ASSESSMENT:           388.70   H92.02  Ear pain              DDx:         ORDERS:         Meds Prescribed:       Cefdinir 300mg Capsules 1 bid for 14 days  #28 (Twenty Eight) capsule(s) Refills: 0                 PLAN:          Ear pain           Prescriptions:       Cefdinir 300mg Capsules 1 bid for 14 days  #28 (Twenty Eight) capsule(s) Refills: 0           Patient Education Handouts:       Acute Ear Infection (Otitis Media)              CHARGE CAPTURE:           Primary Diagnosis:     388.70 Ear pain            H92.02    Otalgia, left ear              Orders:          26457   Office/outpatient visit; established patient, level 3  (In-House)

## 2021-05-18 NOTE — PROGRESS NOTES
Marguerite Abarca  1960     Office/Outpatient Visit    Visit Date: Wed, David 10, 2020 03:21 pm    Provider: Mackenzie Ash N.P. (Assistant: Margoth Mccrary MA)    Location: [facility]        Electronically signed by Mackenzie Ash N.P. on  06/10/2020 04:12:12 PM                             Subjective:        CC: Mrs. Abarca is a 59 year old White female.  dysuria;         HPI:           Patient complains of dysuria.  This began within the last 3 days.  Associated symptoms include urgency.  She denies associated fever.  Treatments tried thus far include None.  Has been drinking more sodas recently and wonders if that has anything to do with her symptoms. She also has history of bladder repair 2020.      ROS:     CONSTITUTIONAL:  Negative for chills and fever.      CARDIOVASCULAR:  Negative for chest pain and palpitations.      RESPIRATORY:  Negative for dyspnea and frequent wheezing.      GASTROINTESTINAL:  Negative for abdominal pain and vomiting.      GENITOURINARY:  Positive for dysuria and urgency.      NEUROLOGICAL:  Negative for dizziness and headaches.          Past Medical History / Family History / Social History:         Last Reviewed on 3/10/2020 10:00 AM by Allison Hernandez    Past Medical History:                 PAST MEDICAL HISTORY         mild mitral regurgitation         GYNECOLOGICAL HISTORY:             CURRENT MEDICAL PROVIDERS:    Obstetrician/Gynecologist    Rheumatologist: Kylie         PREVENTIVE HEALTH MAINTENANCE             COLORECTAL CANCER SCREENING: colonoscopy with the following abnormalities noted-- few diverticula; The next colonoscopy is due   (Q 5 years d/t father hx of colon cancer)     MAMMOGRAM: was last done 2020 with normal results The next one is due  2021     PAP SMEAR: was last done 2018 with normal results Dr. Lopez in [facility]         Surgical History:     Other Surgeries    Hysterectomy: WITH B.S.O.;;;     sinus surgery  per  dr caballero;    Bladder repair (2020);     Cholecystectomy: 16-18;         Family History:     Father:  at age 90;  Arrhythmia; Congestive Heart Failure;  Colon Cancer     Mother:  at age 68; Cause of death was COPD         Social History:     Occupation: [employer] (will retire 2020)     Marital Status:      Children: None         Tobacco/Alcohol/Supplements:     Last Reviewed on 2020 03:02 PM by Yasmeen Varner    Tobacco: She has never smoked.          Alcohol: Frequency: Socially     Caffeine:  She admits to consuming caffeine via coffee ( 1 serving per day ).          Substance Abuse History:     Last Reviewed on 3/10/2020 10:00 AM by Allison Hernandez    None         Mental Health History:     Last Reviewed on 3/10/2020 10:00 AM by Allison Hernandez    NEGATIVE         Communicable Diseases (eg STDs):     Last Reviewed on 9/15/2018 09:45 AM by Dora Alejandra        Current Problems:     Last Reviewed on 3/10/2020 09:58 AM by Allison Hernandez    Gastroesophageal reflux disease    Rheumatoid arthritis    Surgical menopause    Elevated blood pressure without a diagnosis of hypertension    Elevated blood-pressure reading, without diagnosis of hypertension    Diverticulosis    Low back pain    Other intervertebral disc displacement, lumbar region    Bulging lumbar disc    Heart palpitation    Palpitations    Hypothyroidism    (RA) Other specified rheumatoid arthritis, multiple sites    Dysuria    Encounter for screening for depression    Subclinical iodine-deficiency hypothyroidism    Unspecified disorder of nose and nasal sinuses        Immunizations:     None        Allergies:     Last Reviewed on 6/10/2020 03:25 PM by Margoth Mccrary    No Known Allergies.        Current Medications:     Last Reviewed on 6/10/2020 03:25 PM by Margoth Mccrary    Folic Acid 1 mg oral tablet [Take 1 tablet(s) by mouth daily]    Methotrexate Sodium 2.5 mg oral tablet [6 tabs once weekly]    Pantoprazole  40 mg oral tablet, delayed release (enteric coated) [1 tab daily PRN]    MYRBETRIQ ER 50 MG TABLET  [once daily]    VITAMIN D2 1.25MG(50,000 UNIT)  [once weekly]        Objective:        Vitals:         Current: 6/10/2020 3:27:41 PM    Ht:  5 ft, 2.75 in;  Wt: 159.8 lbs;  BMI: 28.5T: 98.5 F (tympanic);  BP: 157/79 mm Hg (left arm, sitting);  P: 74 bpm (left arm (BP Cuff), sitting);  sCr: 0.87 mg/dL;  GFR: 68.34        Exams:     PHYSICAL EXAM:     GENERAL: well developed, well nourished;  no apparent distress;     RESPIRATORY: normal respiratory rate and pattern with no distress; normal breath sounds with no rales, rhonchi, wheezes or rubs;     CARDIOVASCULAR: normal rate; rhythm is regular;     GASTROINTESTINAL: nontender; normal bowel sounds; no organomegaly;     MUSCULOSKELETAL: normal gait;     NEUROLOGIC: mental status: alert and oriented x 3; GROSSLY INTACT     PSYCHIATRIC: appropriate affect and demeanor; normal speech pattern;         Lab/Test Results:         Glucose, Urine: Neg (06/10/2020),     Bilirubin, urine: Negative (06/10/2020),     Ketones, Urine Strip: Trace (06/10/2020),     Specific Gravity, urine: 1.025 (06/10/2020),     Blood in Urine: trace (06/10/2020),     pH, urine: 7.0 (06/10/2020),     Protein Urine QL: trace (06/10/2020),     Urobilinogen, urine: 0.2 E.U./dL (06/10/2020),     Nitrite, Urine: Positive (06/10/2020),     Leukoctyes, urine: Moderate (06/10/2020),     Appearance: Clear (06/10/2020),     collection source: Clean-catch (06/10/2020),     Color: Yellow (06/10/2020),     Performed by:: ruben (06/10/2020),             Assessment:         N39.0   Urinary tract infection, site not specified       R03.0   Elevated blood-pressure reading, without diagnosis of hypertension           ORDERS:         Meds Prescribed:       [New Rx] Cipro 500 mg oral tablet [take 1 tablet (500 mg) by oral route 2 times per day], #14 (fourteen) tablets, Refills: 0 (zero)         Lab Orders:       [id number]   Urinalysis, automated, without microscopy  (In-House)            98074  [facility] - [facility] Urine Culture  (Send-Out)                      Plan:         Urinary tract infection, site not specifiedDecrease soda and coffee intake. Increase water intake. Await urine culture.    LABORATORY:  Labs ordered to be performed today include Urine culture.      FOLLOW-UP: Chronic visit follow up           Prescriptions:       [New Rx] Cipro 500 mg oral tablet [take 1 tablet (500 mg) by oral route 2 times per day], #14 (fourteen) tablets, Refills: 0 (zero)           Orders:       [id number]  Urinalysis, automated, without microscopy  (In-House)            [id number]  [facility] Urine Culture  (Send-Out)              Elevated blood-pressure reading, without diagnosis of hypertensionThis is being followed by her PCP. Reports lower readings at home than in clinic.             Charge Capture:         Primary Diagnosis:     N39.0  Urinary tract infection, site not specified           Orders:      [id number]  Office/outpatient visit; established patient, level 3  (In-House)            95462  Urinalysis, automated, without microscopy  (In-House)              R03.0  Elevated blood-pressure reading, without diagnosis of hypertension

## 2021-05-18 NOTE — PROGRESS NOTES
Marguerite Abarca  1960     Office/Outpatient Visit    Visit Date:  01:46 pm    Provider: Mackenzie Ash N.P. (Assistant: Susan Mcdaniel MA)    Location: [facility]        Electronically signed by Mackenzie Ash N.P. on  2021 08:43:13 AM                             Subjective:        CC: Mrs. Abarca is a 60 year old White female.  Patient presents today for follow up on Pnuemonia;         HPI: Marguerite is here today to follow up on covid19 PNA. Chest xray on 2020 with multifocal PNA,  bibasilar ground-glass and interstitial opacities with likely small bilateral pleural effusions. She was treated with Zithromax, Delsym, and Mucinex. Reports that she is doing much better. Denies cough, fever, or SOA. Has started back to work without problems.    ROS:     CONSTITUTIONAL:  Negative for chills and fever.      CARDIOVASCULAR:  Negative for chest pain and palpitations.      RESPIRATORY:  Positive for recent cough ( resolved ) and dyspnea ( resolved ).      GASTROINTESTINAL:  Negative for abdominal pain and vomiting.      NEUROLOGICAL:  Negative for dizziness and headaches.          Past Medical History / Family History / Social History:         Last Reviewed on 2020 09:35 AM by Tania Naidu    Past Medical History:                 PAST MEDICAL HISTORY         mild mitral regurgitation         GYNECOLOGICAL HISTORY:             CURRENT MEDICAL PROVIDERS:    Obstetrician/Gynecologist    Rheumatologist: Kylie         [facility] MAINTENANCE             COLORECTAL CANCER SCREENING: colonoscopy with the following abnormalities noted-- few diverticula; The next colonoscopy is due   (Q 5 years d/t father hx of colon cancer)     MAMMOGRAM: was last done 2020 with normal results The next one is due  2021     PAP SMEAR: was last done 2018 with normal results Dr. Lopez in [facility]         Surgical History:     Other Surgeries    Hysterectomy: WITH  B.S.O.;;;     sinus surgery  per dr caballero;    Bladder repair (2020);     Cholecystectomy: 2-16-18;         Family History:     Father:  at age 90;  Arrhythmia; Congestive Heart Failure;  Colon Cancer     Mother:  at age 68; Cause of death was COPD         Social History:     Occupation: [employer] (will retire 2020)     Marital Status:      Children: None         Tobacco/Alcohol/Supplements:     Last Reviewed on 2020 11:02 AM by Ally Mcclendon    Tobacco: She has never smoked.          Alcohol: Frequency: Socially     Caffeine:  She admits to consuming caffeine via coffee ( 1 serving per day ).          Substance Abuse History:     Last Reviewed on 2020 09:35 AM by Tania Naidu    None         Mental Health History:     Last Reviewed on 2020 09:35 AM by Tania Naidu    NEGATIVE         Communicable Diseases (eg STDs):     Last Reviewed on 9/15/2018 09:45 AM by Dora Alejandra        Current Problems:     Last Reviewed on 2020 09:35 AM by Tania Naidu    Gastroesophageal reflux disease    Rheumatoid arthritis    Surgical menopause    Elevated blood pressure without a diagnosis of hypertension    Elevated blood-pressure reading, without diagnosis of hypertension    Diverticulosis    Low back pain    Other intervertebral disc displacement, lumbar region    Bulging lumbar disc    Heart palpitation    Palpitations    Hypothyroidism    (RA) Other specified rheumatoid arthritis, multiple sites    Dysuria    Subclinical iodine-deficiency hypothyroidism    Encounter for screening for depression    Unspecified disorder of nose and nasal sinuses    Urinary tract infection, site not specified    Hypothyroidism, unspecified    Acute bronchitis, unspecified    Pneumonia due to other specified infectious organisms    Cough    COVID-19    Pneumonia, unspecified organism        Immunizations:     None        Allergies:     Last Reviewed on 2021 01:49 PM by  Susan Mcdaniel    No Known Allergies.        Current Medications:     Last Reviewed on 1/08/2021 01:49 PM by Susan Mcdaniel    Folic Acid 1 mg oral tablet [Take 1 tablet(s) by mouth daily]    Methotrexate Sodium 2.5 mg oral tablet [6 tabs once weekly]    Pantoprazole 40 mg oral tablet, delayed release (enteric coated) [1 tab daily PRN]    VITAMIN D2 1.25MG(50,000 UNIT)  [once weekly]    nabumetone 750 mg oral tablet [BID]        Objective:        Vitals:         Historical:     12/8/2020  BP:   140/88 mm Hg ( (left arm, , sitting, );) 12/8/2020  P:   67bpm ( (left arm (BP Cuff), , sitting, );) 12/8/2020  Wt:   155.6lbs    Current: 1/8/2021 1:50:28 PM    Ht:  5 ft, 2.75 in;  Wt: 155.4 lbs;  BMI: 27.7T: 97.1 F (temporal);  BP: 182/87 mm Hg (left arm, sitting);  P: 69 bpm (left arm (BP Cuff), sitting);  sCr: 0.87 mg/dL;  GFR: 66.70O2 Sat: 98 % (room air)        Repeat:     1:51:16 PM  BP:   176/83mm Hg (left arm, sitting, HR: 67) 2:25:44 PM  BP:   156/82mm Hg (left arm, sitting)     Exams:     PHYSICAL EXAM:     GENERAL: well developed, well nourished;  no apparent distress;     EYES: PERRL, EOMI     E/N/T: EARS: external auditory canal normal;  bilateral TMs are normal;  NOSE: normal turbinates; no sinus tenderness; OROPHARYNX: oral mucosa is normal; posterior pharynx shows no exudate;     NECK: range of motion is normal; trachea is midline;     RESPIRATORY: normal respiratory rate and pattern with no distress; normal breath sounds with no rales, rhonchi, wheezes or rubs;     CARDIOVASCULAR: normal rate; rhythm is regular;     NEUROLOGIC: mental status: alert and oriented x 3; GROSSLY INTACT     PSYCHIATRIC: appropriate affect and demeanor;         Assessment:         J18.9   Pneumonia, unspecified organism       M06.89   (RA) Other specified rheumatoid arthritis, multiple sites           ORDERS:         Radiology/Test Orders:       [id number]  Radiologic exam chest 2 views  (Send-Out)            3017F  Colorectal CA  screen results documented and reviewed (PV)  (In-House)              Other Orders:         Screening mammogram results documented  (Send-Out)                      Plan:         Pneumonia, unspecified organismCecilia reports much improvement since diagnosis of PNA. Will repeat chest xray today to reevaluate small bilateral pleural effusions seen on 11/24/2020 chest xray.         RADIOLOGY:  I have ordered a chest x-ray (PA and lateral) to be done today.      RECOMMENDATIONS given include: Further recommendation to be given after test results are complete.  MIPS Vaccines Flu and Pneumonia updated in Shot record Screening mammomgram done within last 2 years and results in are chart Colorectal Cancer Screening is up to date and the results are in the chart     FOLLOW-UP: Chronic visit follow up           Orders:       [id number]  Radiologic exam chest 2 views  (Send-Out)              Screening mammogram results documented  (Send-Out)            3017F  Colorectal CA screen results documented and reviewed (PV)  (In-House)              (RA) Other specified rheumatoid arthritis, multiple sitesContinue follow up with rheumatology as per their recommendations.             Charge Capture:         Primary Diagnosis:     J18.9  Pneumonia, unspecified organism           Orders:      [id number]  Office/outpatient visit; established patient, level 3  (In-House)            3017F  Colorectal CA screen results documented and reviewed (PV)  (In-House)              M06.89  (RA) Other specified rheumatoid arthritis, multiple sites

## 2021-05-18 NOTE — PROGRESS NOTES
"Marguerite Abarca 1960     Office/Outpatient Visit    Visit Date: Mon, Jun 25, 2018 03:02 pm    Provider: Dora Alejandra N.P. (Assistant: Tressa Lindsay MA)    Location: [facility]        Electronically signed by Dora Alejandra N.P. on  06/25/2018 05:49:22 PM                             SUBJECTIVE:        CC: Patient was also seen by Shanice Choudhury Np Student     Mrs. Abarca is a 57 year old White female.  This visit is covered under Worker's Compensation.  discuss back pain, ct scan;         HPI:         Patient to be evaluated for low back pain.  The location is primarily in the lower lumbar spine and in the sacroiliac area.  The pain radiates to the abdomen.  She characterizes it as intermittent, moderate in intensity, and sharp.  This is a chronic problem, with essentially constant pain.  She states that the current episode of pain started 7 to 8 months ago.  The event which precipitated this pain was a fall Feel at work ([employer]), was pushing milk and tripped over zip ties laying in the floor.  This occurred at work.  Aggravating factors contributing to the back pain may be lifting, bending over, pushing a heavy object, and Patient states when ever she bends over and raises up she pees on herself.  Medical history is significant for rheumatoid arthritis.  Patient is scheduled for [facility] Aug 7th to \"pin bladder up\" TVT     ROS:     CONSTITUTIONAL:  Negative for chills, fatigue, fever, and weight change.      CARDIOVASCULAR:  Negative for chest pain, orthopnea, paroxysmal nocturnal dyspnea and pedal edema.      RESPIRATORY:  Negative for dyspnea.      GENITOURINARY:  Positive for urinary incontinence Urianry incontience when bending over and standing back up.      MUSCULOSKELETAL:  Positive for back pain ( chronic ).      NEUROLOGICAL:  Negative for dizziness, headaches, paresthesias, and weakness.      PSYCHIATRIC:  Negative for anxiety, depression, and sleep disturbances.     "      PMH/FMH/SH:     Last Reviewed on 2018 03:52 PM by Dora Alejandra    Past Medical History:                 PAST MEDICAL HISTORY         mild mitral regurgitation         GYNECOLOGICAL HISTORY:             CURRENT MEDICAL PROVIDERS:    Obstetrician/Gynecologist    Rheumatologist: Kylie         PREVENTIVE HEALTH MAINTENANCE             COLONOSCOPY: Done within the last 10 years was last done 2016 with normal results     MAMMOGRAM: was last done 2016 with normal results     PAP SMEAR: was last done 2016 with normal results Dr. Lopez in [facility]         Surgical History:         Hysterectomy: WITH B.S.O.;;;     sinus surgery  per dr caballero; Procedures: colonoscopy ;;     Cholecystectomy: 16-18;         Family History:         Positive for PACEMAKER.      Positive for Colon Cancer.  Father:  at age 90;  Arrhythmia; Congestive Heart Failure;  Colon Cancer     Mother:  at age 68; Cause of death was COPD         Social History:     Occupation: [employer]     Marital Status:      Children: None         Tobacco/Alcohol/Supplements:     Last Reviewed on 2018 03:52 PM by Dora Alejandra    Tobacco: She has never smoked.  Non-drinker     Caffeine:  She admits to consuming caffeine via coffee ( 1 serving per day ).          Substance Abuse History:     Last Reviewed on 2018 03:52 PM by Dora Alejandra        Mental Health History:     Last Reviewed on 2018 03:52 PM by Dora Alejandra        Communicable Diseases (eg STDs):     Last Reviewed on 2018 03:52 PM by Dora Alejandra            Current Problems:     Last Reviewed on 2018 03:52 PM by Dora Alejandra    Low back pain     Diverticulosis     Elevated blood pressure without a diagnosis of hypertension     Surgical menopause     Rheumatoid arthritis     Gastroesophageal reflux disease     Rib pain         Immunizations:     None        Allergies:     Last Reviewed on 2018 03:52 PM by  Dora Alejandra      No Known Drug Allergies.         Current Medications:     Last Reviewed on 6/25/2018 03:52 PM by Dora Alejandra    Minivelle 0.075mg Transdermal Patch Apply 1 patch(es) to lower abdomen 2 times weekly     Pantoprazole 40mg Tablets, Delayed Release 1 tab daily PRN     Folic Acid 1mg Tablets Take 1 tablet(s) by mouth daily     Methotrexate Sodium 2.5mg Tablets 8 TABLETS PER WEEK     Vitamin D2         OBJECTIVE:        Vitals:         Current: 6/25/2018 3:05:01 PM    Ht:  5 ft, 2.75 in;  Wt: 150.2 lbs;  BMI: 26.8    T: 98.3 F (oral);  BP: 150/86 mm Hg (left arm, sitting);  P: 75 bpm (left arm (BP Cuff), sitting);  sCr: 0.7 mg/dL;  GFR: 84.76        Exams:     PHYSICAL EXAM:     GENERAL: vital signs recorded - well developed, well nourished;  no apparent distress;     RESPIRATORY: normal respiratory rate and pattern with no distress; normal breath sounds with no rales, rhonchi, wheezes or rubs;     CARDIOVASCULAR: normal rate; rhythm is regular;  no systolic murmur; no edema;     MUSCULOSKELETAL: decreased range of motion noted in: back flexion and lateral flexion;  pain with range of motion in: back flexion;  Right SLR positive at 60 degrees, L SLR posititve at 40 degrees;     PSYCHIATRIC:  appropriate affect and demeanor; normal speech pattern; grossly normal memory;         ASSESSMENT:           724.2   M54.5  Low back pain              DDx:         ORDERS:         Meds Prescribed:       Robaxin (Methocarbamol) 500mg Tablet 1 tab HS PRN  #20 (Twenty) tablet(s) Refills: 1         Radiology/Test Orders:       [id number]  Magnetic resonance imaging, spinal canal and contents, lumbar; without contrast  (Send-Out)           Lab Orders:       APPTO  Appointment need  (In-House)                   PLAN: Refused Hep A injection.          Low back pain         RADIOLOGY:  I have ordered MRI Lumbar Spine w/o contrast to be done today.      FOLLOW-UP: Schedule a follow-up visit in 3 months..             Prescriptions:       Robaxin (Methocarbamol) 500mg Tablet 1 tab HS PRN  #20 (Twenty) tablet(s) Refills: 1           Orders:       [id number]  Magnetic resonance imaging, spinal canal and contents, lumbar; without contrast  (Send-Out)         APPTO  Appointment need  (In-House)             Patient Education Handouts:       Acute Low Back Pain              Patient Recommendations:        For  Low back pain:     Schedule a follow-up visit in 3 months.                APPOINTMENT INFORMATION:        Monday Tuesday Wednesday Thursday Friday Saturday Sunday            Time:___________________AM  PM   Date:_____________________             CHARGE CAPTURE:           Primary Diagnosis:     724.2 Low back pain            M54.5    Low back pain              Orders:          [id number]   Office/outpatient visit; established patient, level 3  (In-House)             APPTO   Appointment need  (In-House)

## 2021-05-18 NOTE — PROGRESS NOTES
Marguerite Abarca 1960     Office/Outpatient Visit    Visit Date: Thu, Aug 30, 2018 10:46 am    Provider: Dora Alejandra N.P. (Assistant: Susan Mcdaniel MA)    Location: [facility]        Electronically signed by Dora Alejandra N.P. on  2018 02:26:17 PM                             SUBJECTIVE:        CC:     Mrs. Abarca is a 57 year old White female.  This is a follow-up visit.  patient presents today for follow up on MRI;         HPI:         Bulging disc: Pt states not going to PT d/t not thinking it helped. They wanted her to get an MRI. Here today to discuss resuts. Pt states going to abdullahi, Dr. France in [facility]. She hasn't been to him a years. She is afraid of pain management d/t needles. She is not ready for surgery yet and does not want a consult at this time. She will try chiropractor first and then go from there.      ROS:     CONSTITUTIONAL:  Negative for chills, fatigue, fever, and weight change.      EYES:  Negative for blurred vision.      CARDIOVASCULAR:  Negative for chest pain, orthopnea, paroxysmal nocturnal dyspnea and pedal edema.      RESPIRATORY:  Negative for dyspnea.      GASTROINTESTINAL:  Negative for abdominal pain, constipation, diarrhea, nausea and vomiting.      MUSCULOSKELETAL:  Positive for back pain.      NEUROLOGICAL:  Negative for dizziness, headaches, paresthesias, and weakness.      PSYCHIATRIC:  Negative for anxiety, depression, and sleep disturbances.          PMH/FMH/SH:     Last Reviewed on 2018 03:52 PM by Dora Alejandra    Past Medical History:                 PAST MEDICAL HISTORY         mild mitral regurgitation         GYNECOLOGICAL HISTORY:             CURRENT MEDICAL PROVIDERS:    Obstetrician/Gynecologist    Rheumatologist: Kylie         PREVENTIVE HEALTH MAINTENANCE             COLONOSCOPY: Done within the last 10 years was last done 2016 with normal results     MAMMOGRAM: was last done 2016 with normal  results     PAP SMEAR: was last done 2016 with normal results Dr. Lopez in [facility]         Surgical History:         Hysterectomy: WITH B.S.O.;;;     sinus surgery  per dr caballero; Procedures: colonoscopy ;;     Cholecystectomy: 16-18;         Family History:         Positive for PACEMAKER.      Positive for Colon Cancer.  Father:  at age 90;  Arrhythmia; Congestive Heart Failure;  Colon Cancer     Mother:  at age 68; Cause of death was COPD         Social History:     Occupation: [employer]     Marital Status:      Children: None         Tobacco/Alcohol/Supplements:     Last Reviewed on 2018 03:52 PM by Dora Alejandra    Tobacco: She has never smoked.  Non-drinker     Caffeine:  She admits to consuming caffeine via coffee ( 1 serving per day ).          Substance Abuse History:     Last Reviewed on 2018 03:52 PM by Dora Alejandra        Mental Health History:     Last Reviewed on 2018 03:52 PM by Dora Alejandra        Communicable Diseases (eg STDs):     Last Reviewed on 2018 03:52 PM by Dora Alejandra            Current Problems:     Last Reviewed on 2018 03:52 PM by Dora Alejandra    Low back pain     Diverticulosis     Elevated blood pressure without a diagnosis of hypertension     Surgical menopause     Rheumatoid arthritis     Gastroesophageal reflux disease         Immunizations:     None        Allergies:     Last Reviewed on 2018 03:52 PM by Dora Alejandra      No Known Drug Allergies.         Current Medications:     Last Reviewed on 2018 03:52 PM by Dora Alejandra    Minivelle 0.075mg Transdermal Patch Apply 1 patch(es) to lower abdomen 2 times weekly     Pantoprazole 40mg Tablets, Delayed Release 1 tab daily PRN     Folic Acid 1mg Tablets Take 1 tablet(s) by mouth daily     Methotrexate Sodium 2.5mg Tablets 8 TABLETS PER WEEK     Vitamin D2         OBJECTIVE:        Vitals:         Current: 2018 10:51:45 AM    Ht:  5  ft, 2.75 in;  Wt: 160.4 lbs;  BMI: 28.6    T: 98.5 F (oral);  BP: 150/75 mm Hg (left arm, sitting);  P: 72 bpm (left arm (BP Cuff), sitting);  sCr: 0.7 mg/dL;  GFR: 87.16        Exams:     PHYSICAL EXAM:     GENERAL: vital signs recorded - well developed, well nourished;  no apparent distress;     EYES: extraocular movements intact; conjunctiva and cornea are normal; PERRLA;     NECK: range of motion is normal; thyroid is non-palpable;     RESPIRATORY: normal respiratory rate and pattern with no distress; normal breath sounds with no rales, rhonchi, wheezes or rubs;     CARDIOVASCULAR: normal rate; rhythm is regular;  no systolic murmur; no edema;     GASTROINTESTINAL: nontender; normal bowel sounds; no organomegaly;     MUSCULOSKELETAL: L low back tenderness, SLR neg bilaterally;     NEUROLOGICAL:  cranial nerves, motor and sensory function, reflexes, gait and coordination are all intact;     PSYCHIATRIC:  appropriate affect and demeanor; normal speech pattern; grossly normal memory;         ASSESSMENT:           722.10   M51.26  Bulging lumbar disc              DDx:         ORDERS:         Lab Orders:       APPTO  Appointment need  (In-House)           Procedures Ordered:       REFER  Referral to Specialist or Other Facility  (Send-Out)                   PLAN:          Bulging lumbar disc continue medications         REFERRALS:  Referral initiated to physical therapy ( [facility] ).      FOLLOW-UP: Schedule a follow-up visit in 6 months..   Chronic visit follow up           Orders:       REFER  Referral to Specialist or Other Facility  (Send-Out)         APPTO  Appointment need  (In-House)             Patient Education Handouts:       [facility] [place]              Patient Recommendations:        For  Bulging lumbar disc:     Schedule a follow-up visit in 6 months.                APPOINTMENT INFORMATION:        Monday Tuesday Wednesday Thursday Friday Saturday Sunday            Time:___________________AM   PM   Date:_____________________             CHARGE CAPTURE:           Primary Diagnosis:     722.10 Bulging lumbar disc            M51.26    Other intervertebral disc displacement, lumbar region              Orders:          [id number]   Office/outpatient visit; established patient, level 3  (In-House)             APPTO   Appointment need  (In-House)

## 2021-05-18 NOTE — PROGRESS NOTES
"Marguerite Abarca 1960     Office/Outpatient Visit    Visit Date:  11:52 am    Provider: Dora Alejandra N.P. (Assistant: Blanco Reid)    Location: [facility]        Electronically signed by Dora Alejandra N.P. on  2018 09:08:15 AM                             SUBJECTIVE:        CC: Pt also seen by RAHAT Christine, ROSENDO Student     Mrs. Abarca is a 58 year old White female.  presents today due to thinks she has sinus infection         HPI:         Mrs. Abarca presents with acute sinusitis, unspecified.  Pt reports nasal congestion and ear pain X 10 days with \"hunks of green\" nasal drainage with some blood tinged;  Reports feeling good otherwise;  States her rheumatologist has her holding her Methotrexate in anticipation that she may need an antibiotic.  Reports concern that she did develop a yeast infection last time she was on an antibiotic. States she has been taking Delsynn and Tylenol Cold and Flu without relief     ROS:     CONSTITUTIONAL:  Negative for fever.      EYES:  Negative for blurred vision.      E/N/T:  Positive for ear pain, nasal congestion, frequent rhinorrhea, hoarseness and sinus pressure.      CARDIOVASCULAR:  Negative for chest pain, orthopnea, paroxysmal nocturnal dyspnea and pedal edema.      RESPIRATORY:  Negative for dyspnea.      GASTROINTESTINAL:  Negative for abdominal pain, constipation, diarrhea, nausea and vomiting.      MUSCULOSKELETAL:  Positive for Bruising to L lower leg- reports a crate fell on her leg at work X 3 days.   Negative for myalgias.      NEUROLOGICAL:  Negative for dizziness, headaches, paresthesias, and weakness.      PSYCHIATRIC:  Negative for anxiety, depression, and sleep disturbances.          PMH/FMH/SH:     Last Reviewed on 9/15/2018 09:45 AM by Dora Alejandra    Past Medical History:                 PAST MEDICAL HISTORY         mild mitral regurgitation         GYNECOLOGICAL HISTORY:             " CURRENT MEDICAL PROVIDERS:    Obstetrician/Gynecologist    Rheumatologist: Kylie         [facility] MAINTENANCE             COLORECTAL CANCER SCREENING: Up to date (colonoscopy q10y; sigmoidoscopy q5y; Cologuard q3y) was last done 2018, Results are in chart; colonoscopy with the following abnormalities noted-- few diverticula     MAMMOGRAM: was last done 2016 with normal results     PAP SMEAR: was last done 2016 with normal results Dr. Lopez in [facility]         Surgical History:         Hysterectomy: WITH B.S.O.;;;     sinus surgery  per dr caballero; Procedures: colonoscopy ;;     Cholecystectomy: 18;         Family History:         Positive for PACEMAKER.      Positive for Colon Cancer.  Father:  at age 90;  Arrhythmia; Congestive Heart Failure;  Colon Cancer     Mother:  at age 68; Cause of death was COPD         Social History:     Occupation: [employer]     Marital Status:      Children: None         Tobacco/Alcohol/Supplements:     Last Reviewed on 9/15/2018 09:45 AM by Dora Alejandra    Tobacco: She has never smoked.  Non-drinker     Caffeine:  She admits to consuming caffeine via coffee ( 1 serving per day ).          Substance Abuse History:     Last Reviewed on 9/15/2018 09:45 AM by Dora Alejandra        Mental Health History:     Last Reviewed on 9/15/2018 09:45 AM by Dora Alejandra        Communicable Diseases (eg STDs):     Last Reviewed on 9/15/2018 09:45 AM by Dora Alejandra            Current Problems:     Last Reviewed on 9/15/2018 09:45 AM by Dora Alejandra    Bulging lumbar disc     Low back pain     Diverticulosis     Elevated blood pressure without a diagnosis of hypertension     Surgical menopause     Rheumatoid arthritis     Gastroesophageal reflux disease         Immunizations:     None        Allergies:     Last Reviewed on 9/15/2018 09:45 AM by Dora Alejandra      No Known Drug Allergies.         Current Medications:     Last Reviewed  on 9/15/2018 09:45 AM by Dora Alejandra 0.075mg Transdermal Patch Apply 1 patch(es) to lower abdomen 2 times weekly     Pantoprazole 40mg Tablets, Delayed Release 1 tab daily PRN     Folic Acid 1mg Tablets Take 1 tablet(s) by mouth daily     Methotrexate Sodium 2.5mg Tablets 8 TABLETS PER WEEK     Vitamin D2         OBJECTIVE:        Vitals:         Current: 11/30/2018 11:56:14 AM    Ht:  5 ft, 2.75 in;  Wt: 162 lbs;  BMI: 28.9    T: 98.2 F (oral);  BP: 166/89 mm Hg (left arm, sitting);  P: 66 bpm (left arm (BP Cuff), sitting);  sCr: 0.7 mg/dL;  GFR: 86.48        Exams:     PHYSICAL EXAM:     GENERAL: vital signs recorded - well developed, well nourished;  no apparent distress;     EYES: extraocular movements intact; conjunctiva and cornea are normal; PERRLA;     E/N/T: EARS: the left TM is yellow and the right TM is has fluid behind it;  NOSE: ethmoid sinus tenderness sinus tenderness present; OROPHARYNX: oral mucosa is normal; posterior pharynx shows erythema and clear drainage;     NECK: range of motion is normal; thyroid is non-palpable;     RESPIRATORY: normal respiratory rate and pattern with no distress; normal breath sounds with no rales, rhonchi, wheezes or rubs;     CARDIOVASCULAR: normal rate; rhythm is regular;  no systolic murmur; no edema;     GASTROINTESTINAL: nontender; normal bowel sounds; no organomegaly;     MUSCULOSKELETAL: bruising to L lower leg; ambulating without difficulty; c/o mild pain with palpation to bruising;     NEUROLOGICAL:  cranial nerves, motor and sensory function, reflexes, gait and coordination are all intact;     PSYCHIATRIC:  appropriate affect and demeanor; normal speech pattern; grossly normal memory;         ASSESSMENT:           461.9   J01.90  Acute sinusitis, unspecified              DDx:         ORDERS:         Meds Prescribed:       Diflucan (Fluconazole) 150mg Tablet Take 1 tablet(s) by mouth once  #1 (One) tablet(s) Refills: 0       Amoxicillin 875mg  Tablet One PO BID X 10 days.  #20 (Twenty) tablet(s) Refills: 0         Lab Orders:       APPTO  Appointment need  (In-House)                   PLAN:          Acute sinusitis, unspecified         FOLLOW-UP: Advised to call if there is no improvement. Schedule a follow-up visit in 2 months..   for Annual Checkup           Prescriptions:       Amoxicillin 875mg Tablet One PO BID X 10 days.  #20 (Twenty) tablet(s) Refills: 0       Diflucan (Fluconazole) 150mg Tablet Take 1 tablet(s) by mouth once  #1 (One) tablet(s) Refills: 0           Orders:       APPTO  Appointment need  (In-House)               Patient Recommendations:        For  Acute sinusitis, unspecified:     Schedule a follow-up visit in 2 months.                APPOINTMENT INFORMATION:        Monday Tuesday Wednesday Thursday Friday Saturday Sunday            Time:___________________AM  PM   Date:_____________________             CHARGE CAPTURE:           Primary Diagnosis:     461.9 Acute sinusitis, unspecified            J01.90    Acute sinusitis, unspecified              Orders:          [id number]   Office/outpatient visit; established patient, level 3  (In-House)             APPTO   Appointment need  (In-House)

## 2021-05-18 NOTE — PROGRESS NOTES
Marguerite Abarca  1960     Office/Outpatient Visit    Visit Date:  02:56 pm    Provider: Allison Hernandez N.P. (Assistant: Yasmeen Varner, RN)    Location: [facility]        Electronically signed by Allison Hernandez N.P. on  2020 04:54:27 PM                             Subjective:        CC: Mrs. Abarca is a 59 year old White female.  spot on nose;         HPI: 59 year old female presenting to clinic c/o lesion to nose and antonio legs. She has family hx of skin cancer and wanted it looked at. She has noticed area to nose for months, but it has grown over the last week. No other acute complaints.         She also has not had any anxiety and palpitations since stopping levothyroxine for her dx subclinical hypothyroid. She will have labs drawn today.    ROS:     CONSTITUTIONAL:  Negative for chills, fatigue, fever and night sweats.      EYES:  Negative for blurred vision.      E/N/T:  Negative for nasal congestion and sinus pressure.      CARDIOVASCULAR:  Negative for chest pain and palpitations.      RESPIRATORY:  Negative for recent cough and dyspnea.      MUSCULOSKELETAL:  Negative for myalgias.      INTEGUMENTARY/BREAST:  See HPI     NEUROLOGICAL:  Negative for dizziness, paresthesias and weakness.      PSYCHIATRIC:  Negative for anxiety, depression, sleep disturbance and suicidal thoughts.          Past Medical History / Family History / Social History:         Last Reviewed on 3/10/2020 10:00 AM by Allison Hernandez    Past Medical History:                 PAST MEDICAL HISTORY         mild mitral regurgitation         GYNECOLOGICAL HISTORY:             CURRENT MEDICAL PROVIDERS:    Obstetrician/Gynecologist    Rheumatologist: Kylie         [facility] MAINTENANCE             COLORECTAL CANCER SCREENING: colonoscopy with the following abnormalities noted-- few diverticula; The next colonoscopy is due   (Q 5 years d/t father hx of colon cancer)      MAMMOGRAM: was last done 2020 with normal results The next one is due  2021     PAP SMEAR: was last done 2018 with normal results Dr. Lopez in [facility]         Surgical History:     Other Surgeries    Hysterectomy: WITH B.S.O.;;;     sinus surgery 2000 per dr caballero;    Bladder repair (2020);     Cholecystectomy: 2-16-18;         Family History:     Father:  at age 90;  Arrhythmia; Congestive Heart Failure;  Colon Cancer     Mother:  at age 68; Cause of death was COPD         Social History:     Occupation: [employer] (will retire 2020)     Marital Status:      Children: None         Tobacco/Alcohol/Supplements:     Last Reviewed on 3/10/2020 10:00 AM by Allison Hernandez    Tobacco: She has never smoked.          Alcohol: Frequency: Socially     Caffeine:  She admits to consuming caffeine via coffee ( 1 serving per day ).          Substance Abuse History:     Last Reviewed on 3/10/2020 10:00 AM by Allison Hernandez    None         Mental Health History:     Last Reviewed on 3/10/2020 10:00 AM by Allison Hernandez    NEGATIVE         Communicable Diseases (eg STDs):     Last Reviewed on 9/15/2018 09:45 AM by Dora Alejandra        Immunizations:     None        Allergies:     Last Reviewed on 2020 03:02 PM by Yasmeen Varner    No Known Allergies.        Current Medications:     Last Reviewed on 2020 03:02 PM by Yasmeen Varner    Folic Acid 1 mg oral tablet [Take 1 tablet(s) by mouth daily]    Methotrexate Sodium 2.5 mg oral tablet [6 tabs once weekly]    Pantoprazole 40 mg oral tablet, delayed release (enteric coated) [1 tab daily PRN]    MYRBETRIQ ER 50 MG TABLET  [once daily]    VITAMIN D2 1.25MG(50,000 UNIT)  [once weekly]        Objective:        Vitals:         Current: 2020 3:05:24 PM    Ht:  5 ft, 2.75 in;  Wt: 164.2 lbs;  BMI: 29.3T: 98.6 F (tympanic);  BP: 171/76 mm Hg (right arm, sitting);  P: 86 bpm (right arm (BP Cuff), sitting);  sCr: 0.87 mg/dL;  GFR:  "69.13        Repeat:     3:7:47 PM  BP:   156/75mm Hg (left arm, sitting)     Exams:     PHYSICAL EXAM:     GENERAL: vital signs recorded - well developed, well nourished;  well groomed;  no apparent distress;     EYES: extraocular movements intact; conjunctiva and cornea are normal; PERRLA;     RESPIRATORY: normal respiratory rate and pattern with no distress; normal breath sounds with no rales, rhonchi, wheezes or rubs;     CARDIOVASCULAR: normal rate; rhythm is regular;  no systolic murmur; no edema;     BREAST/INTEGUMENT: small 0.25 cm cyst like lesion to tip of nose. dark pigmented macules to antonio thighs on lateral aspects.;     MUSCULOSKELETAL: normal gait; normal overall tone     NEUROLOGIC: mental status: alert and oriented x 3;     PSYCHIATRIC:  appropriate affect and demeanor; normal speech pattern; grossly normal memory;         Assessment:         E02   Subclinical iodine-deficiency hypothyroidism       J34.9   Unspecified disorder of nose and nasal sinuses       R03.0   Elevated blood-pressure reading, without diagnosis of hypertension           ORDERS:         Lab Orders:       [id number]  THY - [facility] Thyroid panel with TSH ([id number], [id number])  (Send-Out)              Procedures Ordered:       REFER  Referral to Specialist or Other Facility  (Send-Out)                      Plan:         Subclinical iodine-deficiency hypothyroidismWill notify pt of results. Pt to call clinic with any acute concerns or questions. Pt v/u and had no further questions upon d/c.     LABORATORY:  Labs ordered to be performed today include Thyroid Panel.            Orders:       [id number]  THYII - [facility] Thyroid panel with TSH ([id number], [id number])  (Send-Out)              Unspecified disorder of nose and nasal sinusesPt to go to dermatology to look at lesion to nose and macules to antonio legs. \"Head to toe assessment\"        REFERRALS:  Referral initiated to a dermatologist ( Dr. Jo-Ann Erwin ).            Orders:       REFER  Referral to Specialist or " Other Facility  (Send-Out)              Elevated blood-pressure reading, without diagnosis of hypertensionPt's blood pressure was elevated again today, as it is at every visit. Pt states at home, BP is normal. She declines medical intervention at this point. Pt to notify clinic if above 130/80 at home and pt agreed. No further questions.             Charge Capture:         Primary Diagnosis:     E02  Subclinical iodine-deficiency hypothyroidism           Orders:      [id number]  Office/outpatient visit; established patient, level 4  (In-House)              J34.9  Unspecified disorder of nose and nasal sinuses     R03.0  Elevated blood-pressure reading, without diagnosis of hypertension

## 2021-05-18 NOTE — PROGRESS NOTES
Marguerite Abarca 1960     Office/Outpatient Visit    Visit Date: Sat, Sep 15, 2018 09:00 am    Provider: Dora Alejandra N.P. (Assistant: Rachana Byers MA)    Location: [facility]        Electronically signed by Dora Alejandra N.P. on  09/19/2018 09:57:44 AM                             SUBJECTIVE:        CC:     Mrs. Abarca is a 58 year old White female.  This is a follow-up visit.          HPI:         Bulging disc: Pt states her  hugged her and rubbed/massaged her back. States she heard and felt a pop. States she has felt much better now. She has been cooking and painting and been fine. States she has felt some aches at times. She has lost weight now. She feels her pain was worse with extra weight. States stress causes her to have more pain. She has had stress with work. She has started back on her arthritis medicine. States going back to work next week. She will be able to determine how she is doing then. She states falling and then lifting at work is what started everything.  States with PT she felt it caused spasms. She was lying on the table face down and then had a hard time getting up. States being checked for Vit D and magnesium by her rheumatologist.and were neg. She has an appt with him next month.     ROS:     CONSTITUTIONAL:  Negative for chills, fatigue, fever, and weight change.      EYES:  Negative for blurred vision.      CARDIOVASCULAR:  Negative for chest pain, orthopnea, paroxysmal nocturnal dyspnea and pedal edema.      RESPIRATORY:  Negative for dyspnea.      GASTROINTESTINAL:  Negative for abdominal pain, constipation, diarrhea, nausea and vomiting.      NEUROLOGICAL:  Negative for dizziness, headaches, paresthesias, and weakness.      PSYCHIATRIC:  Negative for anxiety, depression, and sleep disturbances.          PMH/FMH/SH:     Last Reviewed on 9/15/2018 09:23 AM by Dora Alejandra    Past Medical History:                 PAST MEDICAL HISTORY          mild mitral regurgitation         GYNECOLOGICAL HISTORY:             CURRENT MEDICAL PROVIDERS:    Obstetrician/Gynecologist    Rheumatologist: Kylie         [facility]             COLORECTAL CANCER SCREENING: Up to date (colonoscopy q10y; sigmoidoscopy q5y; Cologuard q3y) was last done 2018, Results are in chart; colonoscopy with the following abnormalities noted-- few diverticula     MAMMOGRAM: was last done 2016 with normal results     PAP SMEAR: was last done 2016 with normal results Dr. Lopez in [facility]         Surgical History:         Hysterectomy: WITH B.S.O.;;;     sinus surgery  per dr caballero; Procedures: colonoscopy ;;     Cholecystectomy: 18;         Family History:         Positive for PACEMAKER.      Positive for Colon Cancer.  Father:  at age 90;  Arrhythmia; Congestive Heart Failure;  Colon Cancer     Mother:  at age 68; Cause of death was COPD         Social History:     Occupation: [employer]     Marital Status:      Children: None         Tobacco/Alcohol/Supplements:     Last Reviewed on 9/15/2018 09:23 AM by Dora Alejandra    Tobacco: She has never smoked.  Non-drinker     Caffeine:  She admits to consuming caffeine via coffee ( 1 serving per day ).          Substance Abuse History:     Last Reviewed on 9/15/2018 09:23 AM by Dora Alejandra        Mental Health History:     Last Reviewed on 9/15/2018 09:23 AM by Dora Alejandra        Communicable Diseases (eg STDs):     Last Reviewed on 9/15/2018 09:23 AM by Dora Alejandra            Current Problems:     Last Reviewed on 9/15/2018 09:23 AM by Dora Alejandra    Bulging lumbar disc     Low back pain     Diverticulosis     Elevated blood pressure without a diagnosis of hypertension     Surgical menopause     Rheumatoid arthritis     Gastroesophageal reflux disease         Immunizations:     None        Allergies:     Last Reviewed on 9/15/2018 09:23 AM by Justyn  Dora      No Known Drug Allergies.         Current Medications:     Last Reviewed on 9/15/2018 09:23 AM by Dora Alejandra 0.075mg Transdermal Patch Apply 1 patch(es) to lower abdomen 2 times weekly     Pantoprazole 40mg Tablets, Delayed Release 1 tab daily PRN     Folic Acid 1mg Tablets Take 1 tablet(s) by mouth daily     Methotrexate Sodium 2.5mg Tablets 8 TABLETS PER WEEK     Vitamin D2         OBJECTIVE:        Vitals:         Current: 9/15/2018 9:05:39 AM    Ht:  5 ft, 2.75 in;  Wt: 158.4 lbs;  BMI: 28.3    T: 97.7 F (oral);  BP: 145/80 mm Hg (left arm, sitting);  P: 72 bpm (left arm (BP Cuff), sitting);  sCr: 0.7 mg/dL;  GFR: 85.66        Exams:     PHYSICAL EXAM:     GENERAL: vital signs recorded - well developed, well nourished;  no apparent distress;     EYES: extraocular movements intact; conjunctiva and cornea are normal; PERRLA;     NECK: range of motion is normal; thyroid is non-palpable;     RESPIRATORY: normal respiratory rate and pattern with no distress; normal breath sounds with no rales, rhonchi, wheezes or rubs;     CARDIOVASCULAR: normal rate; rhythm is regular;  no systolic murmur; no edema;     MUSCULOSKELETAL: Low back tenderness;     NEUROLOGICAL:  cranial nerves, motor and sensory function, reflexes, gait and coordination are all intact;     PSYCHIATRIC:  appropriate affect and demeanor; normal speech pattern; grossly normal memory;         ASSESSMENT:           722.10   M51.26  Bulging lumbar disc              DDx:     729.82   R25.2  Leg cramps              DDx:         CHARGE CAPTURE:           Primary Diagnosis:     722.10 Bulging lumbar disc            M51.26    Other intervertebral disc displacement, lumbar region              Orders:          [id number]   Office/outpatient visit; established patient, level 3  (In-House)           729.82 Leg cramps            R25.2    Cramp and spasm

## 2021-05-18 NOTE — PROGRESS NOTES
"Marguerite Abarca 1960     Office/Outpatient Visit    Visit Date: Fri, May 25, 2018 09:07 am    Provider: Dora Alejandra N.P. (Assistant: Sasha Mcclendon MA)    Location: [facility]        Electronically signed by Dora Alejandra N.P. on  05/26/2018 09:00:08 AM                             SUBJECTIVE:        CC:     Ms. Abarca is a 57 year old White female.  Rib Pain;         HPI:         Ms. Abarca presents with hip pain.      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 States this was 6 months ago. She went to [facility] and xrays were normal. States still with hip pain and now with pain around her ribs and stomach. She is worried about her gallbladder. States leaning over the middle aisle \"bunker\" and felt a pain aroun her ribs. Pt also states having urinary leakage. She is seeing a urologist and in the process of having a TVT. She saw a GI at [facility] and did an EGD. She was told her gallbladder was not functioning properly and leaking bile. She then went to [facility] and was told she was not leaking bile. She could have surgery or not. She opted to have the surgery. She was off work for 4 weeks. States she never got better with her back pain. States now with having to slide out of the bed. She cannot tolerate her  massaging her back like he use to. She had not been able to sit in the bed. States when she moves upward to adjust her back hurts and takes a few minutes to get adjusted. She picked up a box the other day. It took her awhile to get adjusted.  She cannot take IBF d/t methotrexate. She takes tylenol with relief.     ROS:     CONSTITUTIONAL:  Negative for chills, fatigue, fever, and weight change.      EYES:  Negative for blurred vision.      CARDIOVASCULAR:  Negative for chest pain, orthopnea, paroxysmal nocturnal dyspnea and pedal edema.      RESPIRATORY:  Negative for dyspnea.      GASTROINTESTINAL:  Negative for abdominal pain, constipation, diarrhea, nausea and " vomiting.      NEUROLOGICAL:  Negative for dizziness, headaches, paresthesias, and weakness.      PSYCHIATRIC:  Negative for anxiety, depression, and sleep disturbances.          PMH/FMH/SH:     Last Reviewed on 2018 09:47 AM by Dora Alejandra    Past Medical History:                 PAST MEDICAL HISTORY         mild mitral regurgitation         GYNECOLOGICAL HISTORY:             CURRENT MEDICAL PROVIDERS:    Obstetrician/Gynecologist    Rheumatologist: Kylie         PREVENTIVE HEALTH MAINTENANCE             COLONOSCOPY: Done within the last 10 years was last done 2016 with normal results     MAMMOGRAM: was last done 2016 with normal results     PAP SMEAR: was last done 2016 with normal results Dr. Lopez in [facility]         Surgical History:         Hysterectomy: WITH B.S.O.;;;     sinus surgery  per dr caballero; Procedures: colonoscopy ;;     Cholecystectomy: 18;         Family History:         Positive for PACEMAKER.      Positive for Colon Cancer.  Father:  at age 90;  Arrhythmia; Congestive Heart Failure;  Colon Cancer     Mother:  at age 68; Cause of death was COPD         Social History:     Occupation: [employer]     Marital Status:      Children: None         Tobacco/Alcohol/Supplements:     Last Reviewed on 2018 09:47 AM by Dora Alejandra    Tobacco: She has never smoked.  Non-drinker     Caffeine:  She admits to consuming caffeine via coffee ( 1 serving per day ).          Substance Abuse History:     Last Reviewed on 2018 09:47 AM by Dora Alejandra        Mental Health History:     Last Reviewed on 2018 09:47 AM by Dora Alejandra        Communicable Diseases (eg STDs):     Last Reviewed on 2018 09:47 AM by Dora Alejandra            Current Problems:     Last Reviewed on 2018 09:47 AM by Dora Alejandra    Hip pain     Diverticulosis     Elevated blood pressure without a diagnosis of hypertension     Surgical menopause      Rheumatoid arthritis     Gastroesophageal reflux disease     Acute sinusitis, other         Immunizations:     None        Allergies:     Last Reviewed on 5/25/2018 09:47 AM by Dora Alejandra      No Known Drug Allergies.         Current Medications:     Last Reviewed on 5/25/2018 09:47 AM by Dora Alejandra    Minivelle 0.075mg Transdermal Patch Apply 1 patch(es) to lower abdomen 2 times weekly     Pantoprazole 40mg Tablets, Delayed Release 1 tab daily PRN     Folic Acid 1mg Tablets Take 1 tablet(s) by mouth daily     Methotrexate Sodium 2.5mg Tablets 8 TABLETS PER WEEK     Vitamin D2         OBJECTIVE:        Vitals:         Current: 5/25/2018 9:10:29 AM    Ht:  5 ft, 2.75 in;  Wt: 152.2 lbs;  BMI: 27.2    T: 98.1 F (oral);  BP: 140/82 mm Hg (left arm, sitting);  P: 75 bpm (left arm (BP Cuff), sitting);  sCr: 0.91 mg/dL;  GFR: 65.57        Exams:     PHYSICAL EXAM:     GENERAL: vital signs recorded - well developed, well nourished;  no apparent distress;     EYES: extraocular movements intact; conjunctiva and cornea are normal; PERRLA;     NECK: range of motion is normal; thyroid is non-palpable;     RESPIRATORY: normal respiratory rate and pattern with no distress; normal breath sounds with no rales, rhonchi, wheezes or rubs;     CARDIOVASCULAR: normal rate; rhythm is regular;  no systolic murmur; no edema;     GASTROINTESTINAL: nontender; normal bowel sounds; no organomegaly;     MUSCULOSKELETAL: Low back tenderness;     NEUROLOGICAL:  cranial nerves, motor and sensory function, reflexes, gait and coordination are all intact;     PSYCHIATRIC:  appropriate affect and demeanor; normal speech pattern; grossly normal memory;         ASSESSMENT:           786.50   R07.82  Rib pain              DDx:     724.2   M54.5  Low back pain              DDx:         ORDERS:         Radiology/Test Orders:       88409  Radiologic examination, ribs, bilateral; including posteroanterior chest, minimum of four views   (Send-Out)         96108  Radiologic examination, spine, lumbosacral;  minimum of four views  (Send-Out)           Lab Orders:       [id number]  [facility] CBC with 3 part diff  (Send-Out)         96443  [facility] Comp. Metabolic Panel  (Send-Out)           Procedures Ordered:       REFER  Referral to Specialist or Other Facility  (Send-Out)                   PLAN:          Rib pain     LABORATORY:  Labs ordered to be performed today include CBC and Comprehensive metabolic panel.      RADIOLOGY:  I have ordered Chest xray PA with Bilateral Ribs to be done today.            Orders:       [id number]  [facility] CBC with 3 part diff  (Send-Out)         50952  [facility] Comp. Metabolic Panel  (Send-Out)         15296  Radiologic examination, ribs, bilateral; including posteroanterior chest, minimum of four views  (Send-Out)            Low back pain         RADIOLOGY:  I have ordered Lumbar/Sacral Spine X-ray to be done today.      REFERRALS:  Referral initiated to physical therapy ( [facility] ).            Orders:       [id number]  Radiologic examination, spine, lumbosacral;  minimum of four views  (Send-Out)         REFER  Referral to Specialist or Other Facility  (Send-Out)             Patient Education Handouts:       Acute Low Back Pain              CHARGE CAPTURE:           Primary Diagnosis:     786.50 Rib pain            R07.82    Intercostal pain              Orders:          15805   Office/outpatient visit; established patient, level 4  (In-House)           724.2 Low back pain            M54.5    Low back pain

## 2021-05-18 NOTE — PROGRESS NOTES
Marguerite Abarca 1960     Office/Outpatient Visit    Visit Date: Thu, Mar 29, 2018 02:18 pm    Provider: Mackenzie Ash N.P. (Assistant: Josefina Hendricks MA)    Location: [facility]        Electronically signed by Mackenzie Ash N.P. on  2018 02:53:43 PM                             SUBJECTIVE:        CC:     Ms. Abarca is a 57 year old White female.  sinuses;         HPI:         Patient complains of upper respiratory illness.  These have been present for the past one week.  The symptoms include body aches, fever and sinus pain/pressure.  She has already tried to relieve the symptoms with Tylenol cold and flu.      ROS:     CONSTITUTIONAL:  Positive for fever ( subjective ).   Negative for chills.      EYES:  Negative for eye drainage and eye pain.      E/N/T:  Positive for sinus pressure.   Negative for ear pain or sore throat.      CARDIOVASCULAR:  Negative for chest pain and palpitations.      RESPIRATORY:  Negative for dyspnea and frequent wheezing.          [facility]/:     Last Reviewed on 2017 02:52 PM by Cristiane Rivera    Past Medical History:                 PAST MEDICAL HISTORY         mild mitral regurgitation         GYNECOLOGICAL HISTORY:             CURRENT MEDICAL PROVIDERS:    Obstetrician/Gynecologist    Rheumatologist: Kylie         PREVENTIVE HEALTH MAINTENANCE             COLONOSCOPY: Done within the last 10 years was last done 2016 with normal results     MAMMOGRAM: was last done 2016 with normal results     PAP SMEAR: was last done 2016 with normal results Dr. Lopez in [facility]         Surgical History:         Hysterectomy: WITH B.S.O.;;;     sinus surgery  per dr caballero; Procedures: colonoscopy ;;     Cholecystectomy: 16-18;         Family History:         Positive for PACEMAKER.      Positive for Colon Cancer.  Father:  at age 90;  Arrhythmia; Congestive Heart Failure;  Colon Cancer     Mother:  at age 68; Cause of death was  COPD         Social History:     Occupation: [employer]     Marital Status:      Children: None         Tobacco/Alcohol/Supplements:     Last Reviewed on 12/05/2017 02:39 PM by Deja Rico    Tobacco: She has never smoked.  Non-drinker     Caffeine:  She admits to consuming caffeine via coffee ( 1 serving per day ).          Substance Abuse History:     Last Reviewed on 3/11/2017 11:56 AM by Dora Alejandra        Mental Health History:     Last Reviewed on 3/11/2017 11:56 AM by Dora Alejandra        Communicable Diseases (eg STDs):     Last Reviewed on 3/11/2017 11:56 AM by Dora Alejandra            Current Problems:     Last Reviewed on 3/11/2017 11:56 AM by Dora Alejandra    Diverticulosis     Elevated blood pressure without a diagnosis of hypertension     Surgical menopause     Rheumatoid arthritis     Gastroesophageal reflux disease         Immunizations:     None        Allergies:     Last Reviewed on 12/05/2017 02:38 PM by Deja Rico      No Known Drug Allergies.         Current Medications:     Last Reviewed on 12/05/2017 02:39 PM by Deja Rico    Minivelle 0.075mg Transdermal Patch Apply 1 patch(es) to lower abdomen 2 times weekly     Pantoprazole 40mg Tablets, Delayed Release 1 tab daily PRN     Folic Acid 1mg Tablets Take 1 tablet(s) by mouth daily     Methotrexate Sodium 2.5mg Tablets 8 TABLETS PER WEEK     Myrbetriq     Vitamin D2         OBJECTIVE:        Vitals:         Current: 3/29/2018 2:20:00 PM    Ht:  5 ft, 2.75 in;  Wt: 157 lbs;  BMI: 28.0    T: 97 F (oral);  BP: 157/94 mm Hg (left arm, sitting);  P: 77 bpm (right arm (BP Cuff), sitting);  sCr: 0.91 mg/dL;  GFR: 66.44        Exams:     PHYSICAL EXAM:     GENERAL: well developed, well nourished;  no apparent distress;     EYES: PERRL, EOMI     E/N/T: EARS: external auditory canal normal;  bilateral TMs are normal;  NOSE: normal turbinates; bilateral maxillary and bilateral frontal sinus tenderness present;  OROPHARYNX: oral mucosa is normal; posterior pharynx shows no exudate;     NECK: range of motion is normal; trachea is midline;     RESPIRATORY: normal respiratory rate and pattern with no distress; normal breath sounds with no rales, rhonchi, wheezes or rubs;     CARDIOVASCULAR: normal rate; rhythm is regular;     NEUROLOGIC: mental status: alert and oriented x 3; GROSSLY INTACT     PSYCHIATRIC: appropriate affect and demeanor;         ASSESSMENT           461.8   J01.90  Acute sinusitis, other              DDx:         ORDERS:         Meds Prescribed:       Cefdinir 300mg Capsules 1 bid for 10 days  #20 (Twenty) capsule(s) Refills: 0                 PLAN:          Acute sinusitis, other         RECOMMENDATIONS given include: Push Fluids, Rest, Follow up if no improvement or worsening symptoms like high fevers, vomiting, weakness, or increasing shortness of air.    , Recommend antihistamine and nasal steroid spray daily during allergy season., and Sinus rinse.      FOLLOW-UP: Schedule follow-up appointments on a p.r.n. basis.            Prescriptions:       Cefdinir 300mg Capsules 1 bid for 10 days  #20 (Twenty) capsule(s) Refills: 0             Patient Recommendations:        For  Acute sinusitis, other:     Schedule follow-up appointments as needed.              CHARGE CAPTURE           **Please note: ICD descriptions below are intended for billing purposes only and may not represent clinical diagnoses**        Primary Diagnosis:         461.8 Acute sinusitis, other            J01.90    Acute sinusitis, unspecified              Orders:          [id number]   Office/outpatient visit; established patient, level 3  (In-House)

## 2021-05-18 NOTE — PROGRESS NOTES
Marguerite Abarca 1960     Office/Outpatient Visit    Visit Date: Fri, Jan 25, 2019 09:17 am    Provider: Dora Alejandra N.P. (Assistant: Tressa Lindsay MA)    Location: [facility]        Electronically signed by Dora Alejandra N.P. on  01/25/2019 02:13:04 PM                             SUBJECTIVE:        CC: Pt also seen by RAHAT Christine, ROSENDO Student     Mrs. Abarca is a 58 year old White female.  physical; heart fluttering, feels like air coming up through chest into throat making her cough;         HPI:         Patient complains of health checkup.  Her last physical exam was >5 years ago.  She is status-post hysterectomy.  She is not currently using any form of contraception.  She performs breast self-exams monthly.    Her last Pap smear was in 12/2018.   Her last mammogram was in 12/2018.   Her last DEXA was 1 year ago, was normal, and reports rheumatologist conducts her DEXA scans.   She's had vision screening done <6 months ago.   She has never had an ECG. Preventative Health updated today.  She is not current with her influenza immunization.  Mrs. Abarca denies any history of abnormal Pap smears.  Tobacco: She has never smoked.          PHQ-9 Depression Screening: Completed form scanned and in chart; Total Score 2 Alcohol Consumption Screening: Completed form scanned and in chart; Total Score 0         Additionally, she presents with history of heart palpitation.  this first began approximately 2 weeks ago.  She describes the sensation as heart 'flip-flops' in chest.  This is moderately bothersome.  The frequency of the episodes is several times per day.  The average duration of each episode is a few seconds.  There are no identifiable aggravating factors.  The dizziness improves with coughing helps.  Associated symptoms include dizziness and shortness of breath.  OTHER (enter)         Dx with ear pain;     pt c/o L ear pain X 1 month;  States she took a round of antibiotics  without relief;  Reports pain with palpation, radiates down neckline;  Reports taking Severe cold and flu OTC meds for relief.  States she has been only taking 1 tablet rather than 2 every 4 hours     ROS:     CONSTITUTIONAL:  Negative for chills, fatigue, fever, and weight change.      EYES:  Negative for blurred vision.      CARDIOVASCULAR:  Negative for chest pain, orthopnea, paroxysmal nocturnal dyspnea and pedal edema.      RESPIRATORY:  Negative for dyspnea.      GASTROINTESTINAL:  Negative for abdominal pain, constipation, diarrhea, nausea and vomiting.      NEUROLOGICAL:  Negative for dizziness, headaches, paresthesias, and weakness.      PSYCHIATRIC:  Negative for anxiety, depression, and sleep disturbances.          PMH/FMH/SH:     Last Reviewed on 9/15/2018 09:45 AM by Dora Alejandra    Past Medical History:                 PAST MEDICAL HISTORY         mild mitral regurgitation         GYNECOLOGICAL HISTORY:             CURRENT MEDICAL PROVIDERS:    Obstetrician/Gynecologist    Rheumatologist: Kylie         PREVENTIVE HEALTH MAINTENANCE             COLORECTAL CANCER SCREENING: Up to date (colonoscopy q10y; sigmoidoscopy q5y; Cologuard q3y) was last done 2018, Results are in chart; colonoscopy with the following abnormalities noted-- few diverticula     MAMMOGRAM: was last done 2016 with normal results     PAP SMEAR: was last done 2016 with normal results Dr. Lopez in [facility]         Surgical History:         Hysterectomy: WITH B.S.O.;;;     sinus surgery  per dr caballero; Procedures: colonoscopy ;;     Cholecystectomy: -18;         Family History:         Positive for PACEMAKER.      Positive for Colon Cancer.  Father:  at age 90;  Arrhythmia; Congestive Heart Failure;  Colon Cancer     Mother:  at age 68; Cause of death was COPD         Social History:     Occupation: [employer]     Marital Status:      Children: None         Tobacco/Alcohol/Supplements:     Last  Reviewed on 11/30/2018 11:54 AM by Blanco Reid    Tobacco: She has never smoked.  Non-drinker     Caffeine:  She admits to consuming caffeine via coffee ( 1 serving per day ).          Substance Abuse History:     Last Reviewed on 9/15/2018 09:45 AM by Dora Alejandra        Mental Health History:     Last Reviewed on 9/15/2018 09:45 AM by Dora Alejandra        Communicable Diseases (eg STDs):     Last Reviewed on 9/15/2018 09:45 AM by Dora Alejandra            Current Problems:     Last Reviewed on 9/15/2018 09:45 AM by Dora Alejandra    Bulging lumbar disc     Low back pain     Diverticulosis     Elevated blood pressure without a diagnosis of hypertension     Surgical menopause     Rheumatoid arthritis     Gastroesophageal reflux disease     Acute sinusitis, unspecified         Immunizations:     None        Allergies:     Last Reviewed on 11/30/2018 11:54 AM by Blanco Reid      No Known Drug Allergies.         Current Medications:     Last Reviewed on 11/30/2018 11:54 AM by Blanco Reid    Minivelle 0.075mg Transdermal Patch Apply 1 patch(es) to lower abdomen 2 times weekly     Pantoprazole 40mg Tablets, Delayed Release 1 tab daily PRN     Folic Acid 1mg Tablets Take 1 tablet(s) by mouth daily     Methotrexate Sodium 2.5mg Tablets 8 TABLETS PER WEEK     Vitamin D2         OBJECTIVE:        Vitals:         Current: 1/25/2019 9:29:46 AM    Ht:  5 ft, 2.75 in;  Wt: 164.2 lbs;  BMI: 29.3    T: 97.9 F (oral);  BP: 178/86 mm Hg (left arm, sitting);  P: 71 bpm (left arm (BP Cuff), sitting);  sCr: 0.7 mg/dL;  GFR: 86.98        Repeat:     9:50:09 AM     BP:   153/78mm Hg (left arm, sitting)         Exams:     PHYSICAL EXAM:     GENERAL: vital signs recorded - well developed, well nourished;  no apparent distress;     EYES: extraocular movements intact; conjunctiva and cornea are normal; PERRLA;     E/N/T: EARS: the left TM is bulging and yellow and right TM is normal;     NECK: range of motion is  normal; thyroid exam reveals diffuse enlargement;     RESPIRATORY: normal respiratory rate and pattern with no distress; normal breath sounds with no rales, rhonchi, wheezes or rubs;     CARDIOVASCULAR: normal rate; rhythm is regular;  no systolic murmur; no edema;     GASTROINTESTINAL: nontender; normal bowel sounds; no organomegaly;     MUSCULOSKELETAL:  Normal range of motion, strength and tone;     NEUROLOGICAL:  cranial nerves, motor and sensory function, reflexes, gait and coordination are all intact;     PSYCHIATRIC:  appropriate affect and demeanor; normal speech pattern; grossly normal memory;         Lab/Test Results:         LABORATORY RESULTS: EKG performed by pr         ASSESSMENT:           V70.0   Z00.00  Health checkup              DDx:     V79.0   Z13.89  Screening for depression              DDx:     785.1   R00.2  Heart palpitation              DDx:     388.70   H92.02  Ear pain              DDx:     794.5   R94.7  Abnormal thyroid findings              DDx:         ORDERS:         Meds Prescribed:       Cefdinir 300mg Capsules 1 bid for 10 days  #20 (Twenty) capsule(s) Refills: 0         Radiology/Test Orders:       3017F  Colorectal CA screen results documented and reviewed (PV)  (In-House)         67373  Electrocardiogram, routine with at least 12 leads; with interpretation and report  (In-House)           Lab Orders:       92695  THYII - [facility] Thyroid panel with TSH (18841, 62630)  (Send-Out)         22471  BDCBC - [facility] CBC with 3 part diff  (Send-Out)         34043  COMP - [facility] Comp. Metabolic Panel  (Send-Out)           Other Orders:         Negative EtOH screen  (In-House)           Calculated BMI above the upper parameter and a follow-up plan was documented in the medical record  (In-House)                   PLAN:          Health checkup     LABORATORY:  Labs ordered to be performed today include CBC and Comprehensive metabolic panel.  [facility] PHQ-9 Depression Screening Completed form  scanned and in chart; Total Score 2 Negative alcohol screen     COLORECTAL CANCER SCREENING: Results are in chart     BMI Elevated - Follow-Up Plan: She was provided education on weight loss strategies           Orders:         Negative EtOH screen  (In-House)         3017F  Colorectal CA screen results documented and reviewed (PV)  (In-House)           Calculated BMI above the upper parameter and a follow-up plan was documented in the medical record  (In-House)         44367  BDCBC - [facility] CBC with 3 part diff  (Send-Out)         89595  COMP - [facility] Comp. Metabolic Panel  (Send-Out)            Heart palpitation         TESTS/PROCEDURES:  Will proceed with an ECG to be performed/scheduled now.            Orders:       17556  Electrocardiogram, routine with at least 12 leads; with interpretation and report  (In-House)            Ear pain           Prescriptions:       Cefdinir 300mg Capsules 1 bid for 10 days  #20 (Twenty) capsule(s) Refills: 0          Abnormal thyroid findings     LABORATORY:  Labs ordered to be performed today include Thyroid Panel.            Orders:       85858  THY - [facility] Thyroid panel with TSH (07321, 61371)  (Send-Out)               CHARGE CAPTURE:           Primary Diagnosis:     V70.0 Health checkup            Z00.00    Encounter for general adult medical examination without abnormal findings              Orders:          16876   Preventive medicine, established patient, age 40-64 years  (In-House)                Negative EtOH screen  (In-House)             3017F   Colorectal CA screen results documented and reviewed (PV)  (In-House)                Calculated BMI above the upper parameter and a follow-up plan was documented in the medical record  (In-House)           V79.0 Screening for depression            Z13.89    Encounter for screening for other disorder              Orders:          89047 -25  Office/outpatient visit; established patient, level 3  (In-House)            785.1 Heart palpitation            R00.2    Palpitations              Orders:          60725   Electrocardiogram, routine with at least 12 leads; with interpretation and report  (In-House)           388.70 Ear pain            H92.02    Otalgia, left ear    794.5 Abnormal thyroid findings            R94.7    Abnormal results of other endocrine function studies

## 2021-05-18 NOTE — PROGRESS NOTES
Marguerite Abarca  1960     Office/Outpatient Visit    Visit Date:  09:14 am    Provider: Tania Naidu N.P. (Assistant: Maral Snell, )    Location: [facility]        Electronically signed by Tania Naidu N.P. on  2020 12:09:17 PM                             Subjective:        CC: Mrs. Abarca is a 60 year old White female.  cough, loss of voice;         HPI: patient reports cough that has continued over the past 1 week. Patient was tested for covid yesterday at a clinic. Patient reports the cough is dry and she is hoarse.  Patient continues on cephalexin with no relief. Denies fever. 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 No recent chest xray. No wheezing per the patient.     ROS:     CONSTITUTIONAL:  Negative for chills, fatigue and fever.      EYES:  Negative for blurred vision.      E/N/T:  Positive for hoarseness.   Negative for ear pain, nasal congestion, frequent rhinorrhea, sinus pressure or sore throat.      CARDIOVASCULAR:  Negative for chest pain and pedal edema.      RESPIRATORY:  Positive for recent cough.   Negative for dyspnea.      GASTROINTESTINAL:  Negative for abdominal pain, constipation, diarrhea, nausea and vomiting.      GENITOURINARY:  Negative for dysuria and frequent urination.      MUSCULOSKELETAL:  Negative for myalgias.      NEUROLOGICAL:  Negative for headaches.      PSYCHIATRIC:  Negative for anxiety, depression, and sleep disturbances.          Past Medical History / Family History / Social History:         Last Reviewed on 2020 09:35 AM by Tania Naidu    Past Medical History:                 PAST MEDICAL HISTORY         mild mitral regurgitation         GYNECOLOGICAL HISTORY:             CURRENT MEDICAL PROVIDERS:    Obstetrician/Gynecologist    Rheumatologist: Kylie         PREVENTIVE [facility] MAINTENANCE             COLORECTAL CANCER SCREENING: colonoscopy with the following abnormalities noted-- few  diverticula; The next colonoscopy is due   (Q 5 years d/t father hx of colon cancer)     MAMMOGRAM: was last done 2020 with normal results The next one is due  2021     PAP SMEAR: was last done 2018 with normal results Dr. Lopez in [facility]         Surgical History:     Other Surgeries    Hysterectomy: WITH B.S.O.;;;     sinus surgery  per dr caballero;    Bladder repair (2020);     Cholecystectomy: -18;         Family History:     Father:  at age 90;  Arrhythmia; Congestive Heart Failure;  Colon Cancer     Mother:  at age 68; Cause of death was COPD         Social History:     Occupation: [employer] (will retire 2020)     Marital Status:      Children: None         Tobacco/Alcohol/Supplements:     Last Reviewed on 2020 09:35 AM by Tania Naidu    Tobacco: She has never smoked.          Alcohol: Frequency: Socially     Caffeine:  She admits to consuming caffeine via coffee ( 1 serving per day ).          Substance Abuse History:     Last Reviewed on 2020 09:35 AM by Tania Naidu    None         Mental Health History:     Last Reviewed on 2020 09:35 AM by Tania Naidu    NEGATIVE         Communicable Diseases (eg STDs):     Last Reviewed on 9/15/2018 09:45 AM by Dora Alejandra        Immunizations:     None        Allergies:     Last Reviewed on 2020 09:35 AM by Tania Naidu    No Known Allergies.        Current Medications:     Last Reviewed on 2020 09:35 AM by Tania Naidu    Folic Acid 1 mg oral tablet [Take 1 tablet(s) by mouth daily]    Methotrexate Sodium 2.5 mg oral tablet [6 tabs once weekly]    Pantoprazole 40 mg oral tablet, delayed release (enteric coated) [1 tab daily PRN]    VITAMIN D2 1.25MG(50,000 UNIT)  [once weekly]    nabumetone 750 mg oral tablet [BID]    predniSONE 10 mg oral tablet [take 6 tablets by mouth on day 1,  5 tablets on day 2,  4 tablets on day 3,  3 tablets on day 4, 2 tablets on day 5,  1 tablet on  day 6]    Tessalon Perles 100 mg oral capsule [take 1 capsule (100 mg) by oral route every 8 hours as needed for cough]    cephALEXin 500 mg oral tablet [take 1 tablet (500 mg) by oral route BID X 10days]    Diflucan 150 mg oral tablet [take 1 tablet (150 mg) now, repeat in 3 days]        Objective:        Vitals:         Current: 11/24/2020 9:17:54 AM    Ht:  5 ft, 2.75 in;  Wt: 152.7 lbs;  BMI: 27.3T: 98.7 F (temporal);  BP: 140/75 mm Hg (left arm, sitting);  P: 84 bpm (left arm (BP Cuff), sitting);  sCr: 0.87 mg/dL;  GFR: 66.21O2 Sat: 97 % (room air)        Exams:     PHYSICAL EXAM:     GENERAL:  well developed and nourished; appropriately groomed; in no apparent distress;     EYES: PERRL, EOMI     E/N/T:  normal EACs, TMs, nasal/oral mucosa, teeth, gingiva, and oropharynx;     NECK:  supple, full ROM; no thyromegaly; no carotid bruits;     RESPIRATORY: normal respiratory rate and pattern with no distress; normal breath sounds with no rales, rhonchi, wheezes or rubs;     CARDIOVASCULAR: normal rate; rhythm is regular;  no systolic murmur; no edema;     GASTROINTESTINAL: nontender; normal bowel sounds; no organomegaly;     LYMPHATIC: no enlargement of cervical or facial nodes; no supraclavicular nodes;     BREAST/INTEGUMENT: no rashs or lesions noted;     MUSCULOSKELETAL:  gait normal;     NEUROLOGIC: mental status: alert and oriented x 3; GROSSLY INTACT     PSYCHIATRIC:  appropriate affect and demeanor; normal speech pattern; grossly normal memory;         Assessment:         R05   Cough       J16.8   Pneumonia due to other specified infectious organisms           ORDERS:         Meds Prescribed:       [New Rx] azithromycin 250 mg oral tablet [take 2 tablets (500 mg) today and 1 tablet for 4 days], #6 (six) tablets, Refills: 0 (zero)         Radiology/Test Orders:       [id number]  Radiologic exam chest 2 views  (Send-Out)                      Plan:         Cough        RADIOLOGY:  I have ordered a chest x-ray (PA and  lateral) to be done today.            Orders:       [id number]  Radiologic exam chest 2 views  (Send-Out)              Pneumonia due to other specified infectious organisms        RADIOLOGY:  I have ordered a chest x-ray (PA and lateral) to be done today.      RECOMMENDATIONS given include: Educated patient that we are awaiting covid-19 test results. During this time quarantine, self isolate and self monitor. Educated to call or report to the ER if having worsening shortness of breath, cough, high fever, or new symptoms. Patient understood these instructions..  Chest xray  displays bibasilar ground-glass and interstitial opacities with likely small bilateral pleural effusions.          Prescriptions:       [New Rx] azithromycin 250 mg oral tablet [take 2 tablets (500 mg) today and 1 tablet for 4 days], #6 (six) tablets, Refills: 0 (zero)             Charge Capture:         Primary Diagnosis:     R05  Cough           Orders:      [id number]  Office/outpatient visit; established patient, level 4  (In-House)              J16.8  Pneumonia due to other specified infectious organisms

## 2021-05-18 NOTE — PROGRESS NOTES
Marguerite Abarca  1960     Office/Outpatient Visit    Visit Date:  02:40 pm    Provider: Mackenzie Ash N.P. (Assistant: Roula Rivera,  )    Location: [facility]        Electronically signed by Mackenzie Ash N.P. on  2020 03:02:23 PM                             Subjective:        CC: Mrs. Abarca is a 60 year old White female.  Congestion, cough (AUDIO ONLY - [phone]) (PT IS NOT TAKING MYRBETRIQ);         HPI:           Patient to be evaluated for acute upper respiratory infection, unspecified.  These have been present for the past one week.  The symptoms include cough, sinus pain/pressure and hoarseness.  She denies fever or sore throat.  She has already tried to relieve the symptoms with O.T.C. cough medication and Tylenol sinus.  Medical history is significant for Rheumatoid arthritis.      ROS:     CONSTITUTIONAL:  Negative for chills and fever.      EYES:  Negative for eye drainage and eye pain.      E/N/T:  Positive for sinus pressure.   Negative for ear pain or sore throat.      CARDIOVASCULAR:  Negative for chest pain and palpitations.      RESPIRATORY:  Positive for recent cough.   Negative for dyspnea or frequent wheezing.      NEUROLOGICAL:  Negative for dizziness and headaches.      PSYCHIATRIC:  Negative for depression and suicidal thoughts.          Past Medical History / Family History / Social History:         Last Reviewed on 3/10/2020 10:00 AM by Allison Hernandez    Past Medical History:                 PAST MEDICAL HISTORY         mild mitral regurgitation         GYNECOLOGICAL HISTORY:             CURRENT MEDICAL PROVIDERS:    Obstetrician/Gynecologist    Rheumatologist: Kylie         PREVENTIVE HEALTH MAINTENANCE             COLORECTAL CANCER SCREENING: colonoscopy with the following abnormalities noted-- few diverticula; The next colonoscopy is due   (Q 5 years d/t father hx of colon cancer)     MAMMOGRAM: was last done  2020 with normal results The next one is due  2021     PAP SMEAR: was last done 2018 with normal results Dr. Lopez in [facility]         Surgical History:     Other Surgeries    Hysterectomy: WITH B.S.O.;;;     sinus surgery  per dr caballero;    Bladder repair (2020);     Cholecystectomy: -16-18;         Family History:     Father:  at age 90;  Arrhythmia; Congestive Heart Failure;  Colon Cancer     Mother:  at age 68; Cause of death was COPD         Social History:     Occupation: [employer] (will retire 2020)     Marital Status:      Children: None         Tobacco/Alcohol/Supplements:     Last Reviewed on 6/10/2020 03:25 PM by Margoth Mccrary    Tobacco: She has never smoked.          Alcohol: Frequency: Socially     Caffeine:  She admits to consuming caffeine via coffee ( 1 serving per day ).          Substance Abuse History:     Last Reviewed on 3/10/2020 10:00 AM by Allison Hernandez    None         Mental Health History:     Last Reviewed on 3/10/2020 10:00 AM by Allison Hernandez    NEGATIVE         Communicable Diseases (eg STDs):     Last Reviewed on 9/15/2018 09:45 AM by Dora Alejandra        Current Problems:     Last Reviewed on 3/10/2020 09:58 AM by Allison Hernandez    Gastroesophageal reflux disease    Rheumatoid arthritis    Surgical menopause    Elevated blood-pressure reading, without diagnosis of hypertension    Elevated blood pressure without a diagnosis of hypertension    Diverticulosis    Low back pain    Other intervertebral disc displacement, lumbar region    Bulging lumbar disc    Heart palpitation    Palpitations    Hypothyroidism    (RA) Other specified rheumatoid arthritis, multiple sites    Subclinical iodine-deficiency hypothyroidism    Dysuria    Encounter for screening for depression    Unspecified disorder of nose and nasal sinuses    Urinary tract infection, site not specified    Hypothyroidism, unspecified        Immunizations:     None         Allergies:     Last Reviewed on 11/16/2020 02:41 PM by Roula Rivera    No Known Allergies.        Current Medications:     Last Reviewed on 11/16/2020 02:41 PM by Roula Rivera    Folic Acid 1 mg oral tablet [Take 1 tablet(s) by mouth daily]    Methotrexate Sodium 2.5 mg oral tablet [6 tabs once weekly]    Pantoprazole 40 mg oral tablet, delayed release (enteric coated) [1 tab daily PRN]    MYRBETRIQ ER 50 MG TABLET  [once daily]    VITAMIN D2 1.25MG(50,000 UNIT)  [once weekly]    nabumetone 750 mg oral tablet [BID]        Objective:        Exams:     PHYSICAL EXAM:     RESPIRATORY: no sounds of respiratory distress over telephone;     NEUROLOGIC: mental status: alert and oriented x 3;     PSYCHIATRIC: appropriate affect and demeanor; speech pattern: hoarseness;         Assessment:         J20.9   Acute bronchitis, unspecified           ORDERS:         Meds Prescribed:       [New Rx] predniSONE 10 mg oral tablet [take 6 tablets by mouth on day 1,  5 tablets on day 2,  4 tablets on day 3,  3 tablets on day 4, 2 tablets on day 5,  1 tablet on day 6], #21 (twenty one) tablets, Refills: 0 (zero)       [New Rx] Tessalon Perles 100 mg oral capsule [take 1 capsule (100 mg) by oral route every 8 hours as needed for cough], #30 (thirty) capsules, Refills: 0 (zero)       [New Rx] cefdinir 300 mg oral capsule [take 1 capsule (300 mg) by oral route every 12 hours], #20 (twenty) capsules, Refills: 0 (zero)                 Plan:         Acute bronchitis, unspecified        RECOMMENDATIONS given include: Push Fluids, Rest, Follow up if no improvement or worsening symptoms like high fevers, vomiting, weakness, or increasing shortness of air.    .  MIPS Vaccines Flu and Pneumonia updated in Shot record Telehealth: Verbal consent obtained for visit to occur via phone call; Staff, other than provider, present during telephone visit include Yasmeen Varner RN; Total time spent was 9 minutes; [id number]--Quxtsgdzr E/M 5-10 minutes      FOLLOW-UP: Chronic visit follow up           Prescriptions:       [New Rx] predniSONE 10 mg oral tablet [take 6 tablets by mouth on day 1,  5 tablets on day 2,  4 tablets on day 3,  3 tablets on day 4, 2 tablets on day 5,  1 tablet on day 6], #21 (twenty one) tablets, Refills: 0 (zero)       [New Rx] Tessalon Perles 100 mg oral capsule [take 1 capsule (100 mg) by oral route every 8 hours as needed for cough], #30 (thirty) capsules, Refills: 0 (zero)       [New Rx] cefdinir 300 mg oral capsule [take 1 capsule (300 mg) by oral route every 12 hours], #20 (twenty) capsules, Refills: 0 (zero)             Charge Capture:         Primary Diagnosis:     J20.9  Acute bronchitis, unspecified           Orders:      [id number]  Phys/QHP telephone evaluation 5-10 min  (In-House)

## 2021-05-18 NOTE — PROGRESS NOTES
Marguerite Abarca  1960     Office/Outpatient Visit    Visit Date: Tue, Dec 8, 2020 11:02 am    Provider: Tania Naidu N.P. (Assistant: Ally Mcclendon LPN)    Location: [facility]        Electronically signed by Tania Naidu N.P. on  2020 12:30:54 PM                             Subjective:        CC: Mrs. Abarca is a 60 year old White female.  f/u from pnuemonia & covid positive;         HPI: Patient presents today for follow-up regarding pneumonia and Covid positive.  Patient reports that she is feeling better with some mild shortness of breath residual.  Patient is taking Mucinex currently and his having some productive cough.  Patient is being active doing deep breathing exercises as discussed at prior visit.    ROS:     CONSTITUTIONAL:  Positive for fatigue.   Negative for chills or fever.      EYES:  Negative for blurred vision.      E/N/T:  Negative for ear pain, nasal congestion, frequent rhinorrhea, hoarseness, sinus pressure and sore throat.      CARDIOVASCULAR:  Negative for chest pain and pedal edema.      RESPIRATORY:  Positive for recent cough and dyspnea.      GASTROINTESTINAL:  Negative for abdominal pain, constipation, diarrhea, nausea and vomiting.      GENITOURINARY:  Negative for dysuria and frequent urination.      MUSCULOSKELETAL:  Negative for myalgias.      NEUROLOGICAL:  Negative for headaches.      PSYCHIATRIC:  Negative for anxiety, depression, and sleep disturbances.          Past Medical History / Family History / Social History:         Last Reviewed on 2020 09:35 AM by Tania Naidu    Past Medical History:                 PAST MEDICAL HISTORY         mild mitral regurgitation         GYNECOLOGICAL HISTORY:             CURRENT MEDICAL PROVIDERS:    Obstetrician/Gynecologist    Rheumatologist: Kylie         [facility]             COLORECTAL CANCER SCREENING: colonoscopy with the following abnormalities noted--  few diverticula; The next colonoscopy is due   (Q 5 years d/t father hx of colon cancer)     MAMMOGRAM: was last done 2020 with normal results The next one is due  2021     PAP SMEAR: was last done 2018 with normal results Dr. Lopez in [facility]         Surgical History:     Other Surgeries    Hysterectomy: WITH B.S.O.;;;     sinus surgery  per dr caballero;    Bladder repair (2020);     Cholecystectomy: 16-18;         Family History:     Father:  at age 90;  Arrhythmia; Congestive Heart Failure;  Colon Cancer     Mother:  at age 68; Cause of death was COPD         Social History:     Occupation: [employer] (will retire 2020)     Marital Status:      Children: None         Tobacco/Alcohol/Supplements:     Last Reviewed on 2020 09:35 AM by Tania Naidu    Tobacco: She has never smoked.          Alcohol: Frequency: Socially     Caffeine:  She admits to consuming caffeine via coffee ( 1 serving per day ).          Substance Abuse History:     Last Reviewed on 2020 09:35 AM by Tania Naidu    None         Mental Health History:     Last Reviewed on 2020 09:35 AM by Tania Naidu    NEGATIVE         Communicable Diseases (eg STDs):     Last Reviewed on 9/15/2018 09:45 AM by Dora Alejandra        Immunizations:     None        Allergies:     Last Reviewed on 2020 09:35 AM by Tania Naidu    No Known Allergies.        Current Medications:     Last Reviewed on 2020 09:35 AM by Tania Naidu    Folic Acid 1 mg oral tablet [Take 1 tablet(s) by mouth daily]    Methotrexate Sodium 2.5 mg oral tablet [6 tabs once weekly]    Pantoprazole 40 mg oral tablet, delayed release (enteric coated) [1 tab daily PRN]    VITAMIN D2 1.25MG(50,000 UNIT)  [once weekly]    nabumetone 750 mg oral tablet [BID]    predniSONE 10 mg oral tablet [take 6 tablets by mouth on day 1,  5 tablets on day 2,  4 tablets on day 3,  3 tablets on day 4, 2 tablets on day 5,  1 tablet  on day 6]    Tessalon Perles 100 mg oral capsule [take 1 capsule (100 mg) by oral route every 8 hours as needed for cough]    cephALEXin 500 mg oral tablet [take 1 tablet (500 mg) by oral route BID X 10days]    Diflucan 150 mg oral tablet [take 1 tablet (150 mg) now, repeat in 3 days]    azithromycin 250 mg oral tablet [take 2 tablets (500 mg) today and 1 tablet for 4 days]        Objective:        Vitals: O2 saturation 98%        Current: 12/8/2020 11:07:42 AM    Ht:  5 ft, 2.75 in;  Wt: 155.6 lbs;  BMI: 27.8T: 97.3 F (temporal);  BP: 140/88 mm Hg (left arm, sitting);  P: 67 bpm (left arm (BP Cuff), sitting);  sCr: 0.87 mg/dL;  GFR: 66.74        Exams:     PHYSICAL EXAM:     GENERAL:  well developed and nourished; appropriately groomed; in no apparent distress;     EYES: PERRL, EOMI     E/N/T:  normal EACs, TMs, nasal/oral mucosa, teeth, gingiva, and oropharynx;     RESPIRATORY: normal respiratory rate and pattern with no distress; rhonchi heard in the LLL and Mild;     CARDIOVASCULAR: normal rate; rhythm is regular;  no systolic murmur; no edema;     LYMPHATIC: no enlargement of cervical or facial nodes; no supraclavicular nodes;     BREAST/INTEGUMENT: no rashs or lesions noted;     MUSCULOSKELETAL:  gait normal;     NEUROLOGIC: mental status: alert and oriented x 3; GROSSLY INTACT     PSYCHIATRIC:  appropriate affect and demeanor; normal speech pattern; grossly normal memory;         Assessment:         U07.1   COVID-19       J18.9   Pneumonia, unspecified organism           Plan:         COVID-19Patient appears to be improving completed her antibiotics and steroid.  I would recommend another week from work so the patient can rest and fully recover.  Would recommend a follow-up within the next 3 weeks to obtain a chest x-ray to ensure the pneumonia has resolved.Continue with deep breathing exercises as discussed.            Charge Capture:         Primary Diagnosis:     U07.1  COVID-19           Orders:       [id number]  Office/outpatient visit; established patient, level 3  (In-House)              J18.9  Pneumonia, unspecified organism

## 2021-06-02 ENCOUNTER — DOCUMENTATION (OUTPATIENT)
Dept: SURGERY | Facility: CLINIC | Age: 61
End: 2021-06-02

## 2021-06-02 PROBLEM — Z79.1 LONG TERM (CURRENT) USE OF NON-STEROIDAL ANTI-INFLAMMATORIES (NSAID): Status: ACTIVE | Noted: 2021-06-02

## 2021-06-02 PROBLEM — Z79.899 OTHER LONG TERM (CURRENT) DRUG THERAPY: Status: ACTIVE | Noted: 2021-06-02

## 2021-06-02 PROBLEM — M54.50 LOW BACK PAIN: Status: ACTIVE | Noted: 2021-06-02

## 2021-06-02 PROBLEM — M54.16 RADICULOPATHY, LUMBAR REGION: Status: ACTIVE | Noted: 2021-06-02

## 2021-06-02 NOTE — PROGRESS NOTES
5 YR CY, last done 08/25/2016.     06/03/2021, mailed colonoscopy recall letter to patient.     Thank you.

## 2021-06-16 ENCOUNTER — TELEPHONE (OUTPATIENT)
Dept: FAMILY MEDICINE CLINIC | Age: 61
End: 2021-06-16

## 2021-06-16 NOTE — TELEPHONE ENCOUNTER
Provider: SKIP WHELAN  Caller: BRYANT IBARRA  Phone Number: [phone]  Reason for Call: PATIENT WAS PUT ON THYROID MEDICATION A WHILE AGO BUT HAS SINCE STOPPED TAKING THE MEDICATION AND NOW IT SEEMS LIKE SHE'S GAINING WEIGHT EASILY. WOULD LIKE TO TALK TO SKIP WHELAN ABOUT WHAT COULD BE MAKING HER GAIN WEIGHT.

## 2021-06-17 NOTE — TELEPHONE ENCOUNTER
Would recommend appointment to discuss. Looks like thyroid lab has not been checked since 4/2020 so may want to recheck that along with any other pertinent labs. AC

## 2021-06-18 ENCOUNTER — TELEPHONE (OUTPATIENT)
Dept: OBSTETRICS AND GYNECOLOGY | Age: 61
End: 2021-06-18

## 2021-07-01 VITALS
HEART RATE: 66 BPM | DIASTOLIC BLOOD PRESSURE: 89 MMHG | TEMPERATURE: 98.2 F | BODY MASS INDEX: 28.7 KG/M2 | WEIGHT: 162 LBS | SYSTOLIC BLOOD PRESSURE: 166 MMHG | HEIGHT: 63 IN

## 2021-07-01 VITALS
DIASTOLIC BLOOD PRESSURE: 78 MMHG | TEMPERATURE: 97.9 F | HEART RATE: 71 BPM | BODY MASS INDEX: 29.09 KG/M2 | SYSTOLIC BLOOD PRESSURE: 153 MMHG | HEIGHT: 63 IN | WEIGHT: 164.2 LBS

## 2021-07-01 VITALS
HEART RATE: 72 BPM | WEIGHT: 160.4 LBS | TEMPERATURE: 98.5 F | SYSTOLIC BLOOD PRESSURE: 150 MMHG | HEIGHT: 63 IN | BODY MASS INDEX: 28.42 KG/M2 | DIASTOLIC BLOOD PRESSURE: 75 MMHG

## 2021-07-01 VITALS
HEIGHT: 63 IN | BODY MASS INDEX: 28.74 KG/M2 | TEMPERATURE: 97.9 F | HEART RATE: 76 BPM | DIASTOLIC BLOOD PRESSURE: 95 MMHG | WEIGHT: 162.2 LBS | SYSTOLIC BLOOD PRESSURE: 175 MMHG

## 2021-07-01 VITALS
WEIGHT: 158.4 LBS | TEMPERATURE: 97.7 F | HEIGHT: 63 IN | HEART RATE: 72 BPM | BODY MASS INDEX: 28.07 KG/M2 | SYSTOLIC BLOOD PRESSURE: 145 MMHG | DIASTOLIC BLOOD PRESSURE: 80 MMHG

## 2021-07-01 VITALS
HEIGHT: 63 IN | SYSTOLIC BLOOD PRESSURE: 157 MMHG | BODY MASS INDEX: 27.82 KG/M2 | DIASTOLIC BLOOD PRESSURE: 94 MMHG | WEIGHT: 157 LBS | HEART RATE: 77 BPM | TEMPERATURE: 97 F

## 2021-07-01 VITALS
SYSTOLIC BLOOD PRESSURE: 150 MMHG | HEART RATE: 75 BPM | DIASTOLIC BLOOD PRESSURE: 86 MMHG | WEIGHT: 150.2 LBS | HEIGHT: 63 IN | TEMPERATURE: 98.3 F | BODY MASS INDEX: 26.61 KG/M2

## 2021-07-01 VITALS
HEIGHT: 63 IN | SYSTOLIC BLOOD PRESSURE: 140 MMHG | HEART RATE: 75 BPM | WEIGHT: 152.2 LBS | BODY MASS INDEX: 26.97 KG/M2 | TEMPERATURE: 98.1 F | DIASTOLIC BLOOD PRESSURE: 82 MMHG

## 2021-07-02 VITALS
WEIGHT: 167.4 LBS | SYSTOLIC BLOOD PRESSURE: 150 MMHG | BODY MASS INDEX: 29.66 KG/M2 | TEMPERATURE: 98.9 F | HEART RATE: 83 BPM | DIASTOLIC BLOOD PRESSURE: 90 MMHG | HEIGHT: 63 IN

## 2021-07-02 VITALS
HEART RATE: 67 BPM | DIASTOLIC BLOOD PRESSURE: 88 MMHG | WEIGHT: 155.6 LBS | BODY MASS INDEX: 27.57 KG/M2 | SYSTOLIC BLOOD PRESSURE: 140 MMHG | TEMPERATURE: 97.3 F | HEIGHT: 63 IN

## 2021-07-02 VITALS
SYSTOLIC BLOOD PRESSURE: 156 MMHG | WEIGHT: 155.4 LBS | DIASTOLIC BLOOD PRESSURE: 82 MMHG | HEART RATE: 69 BPM | TEMPERATURE: 97.1 F | BODY MASS INDEX: 27.54 KG/M2 | OXYGEN SATURATION: 98 % | HEIGHT: 63 IN

## 2021-07-02 VITALS
DIASTOLIC BLOOD PRESSURE: 79 MMHG | BODY MASS INDEX: 28.31 KG/M2 | SYSTOLIC BLOOD PRESSURE: 157 MMHG | TEMPERATURE: 98.5 F | WEIGHT: 159.8 LBS | HEIGHT: 63 IN | HEART RATE: 74 BPM

## 2021-07-02 VITALS
OXYGEN SATURATION: 97 % | SYSTOLIC BLOOD PRESSURE: 140 MMHG | WEIGHT: 152.7 LBS | HEART RATE: 84 BPM | TEMPERATURE: 98.7 F | BODY MASS INDEX: 27.05 KG/M2 | DIASTOLIC BLOOD PRESSURE: 75 MMHG | HEIGHT: 63 IN

## 2021-07-02 VITALS
SYSTOLIC BLOOD PRESSURE: 156 MMHG | HEIGHT: 63 IN | WEIGHT: 164.2 LBS | DIASTOLIC BLOOD PRESSURE: 75 MMHG | BODY MASS INDEX: 29.09 KG/M2 | HEART RATE: 86 BPM | TEMPERATURE: 98.6 F

## 2021-07-22 ENCOUNTER — TELEPHONE (OUTPATIENT)
Dept: FAMILY MEDICINE CLINIC | Age: 61
End: 2021-07-22

## 2021-07-22 NOTE — TELEPHONE ENCOUNTER
Caller: Marguerite Abarca    Relationship: Self    Best call back number: [phone]    What orders are you requesting (i.e. lab or imaging): BLOOD WORK, THYROID    In what timeframe would the patient need to come in: AS SOON AS POSSIBLE    Where will you receive your lab/imaging services: IN OFFICE

## 2021-07-23 NOTE — TELEPHONE ENCOUNTER
As previous, would recommend that she come in to discuss symptoms so that appropriate labs can be ordered. Not sure what all testing she needs without seeing her.

## 2021-07-29 PROBLEM — M51.36 BULGING LUMBAR DISC: Status: ACTIVE | Noted: 2021-07-29

## 2021-07-29 PROBLEM — E03.9 HYPOTHYROIDISM, UNSPECIFIED: Status: ACTIVE | Noted: 2021-07-29

## 2021-07-29 PROBLEM — K57.90 DIVERTICULOSIS: Status: ACTIVE | Noted: 2021-07-29

## 2021-07-29 PROBLEM — R00.2 PALPITATIONS: Status: ACTIVE | Noted: 2021-07-29

## 2021-07-29 PROBLEM — M51.369 BULGING LUMBAR DISC: Status: ACTIVE | Noted: 2021-07-29

## 2021-07-30 ENCOUNTER — LAB (OUTPATIENT)
Dept: LAB | Facility: HOSPITAL | Age: 61
End: 2021-07-30

## 2021-07-30 ENCOUNTER — OFFICE VISIT (OUTPATIENT)
Dept: FAMILY MEDICINE CLINIC | Age: 61
End: 2021-07-30

## 2021-07-30 VITALS
HEART RATE: 75 BPM | BODY MASS INDEX: 31.25 KG/M2 | WEIGHT: 169.8 LBS | TEMPERATURE: 98.2 F | HEIGHT: 62 IN | OXYGEN SATURATION: 97 % | SYSTOLIC BLOOD PRESSURE: 148 MMHG | DIASTOLIC BLOOD PRESSURE: 72 MMHG

## 2021-07-30 DIAGNOSIS — E03.9 HYPOTHYROIDISM, UNSPECIFIED TYPE: ICD-10-CM

## 2021-07-30 DIAGNOSIS — R63.5 WEIGHT GAIN: Primary | ICD-10-CM

## 2021-07-30 DIAGNOSIS — M06.9 RHEUMATOID ARTHRITIS, INVOLVING UNSPECIFIED SITE, UNSPECIFIED WHETHER RHEUMATOID FACTOR PRESENT (HCC): ICD-10-CM

## 2021-07-30 DIAGNOSIS — L65.9 HAIR LOSS: ICD-10-CM

## 2021-07-30 DIAGNOSIS — Z11.59 SCREENING FOR VIRAL DISEASE: ICD-10-CM

## 2021-07-30 DIAGNOSIS — R63.5 WEIGHT GAIN: ICD-10-CM

## 2021-07-30 PROBLEM — A60.00 HERPES GENITALIS: Status: ACTIVE | Noted: 2018-01-01

## 2021-07-30 LAB
25(OH)D3 SERPL-MCNC: 61.1 NG/ML (ref 30–100)
ALBUMIN SERPL-MCNC: 3.9 G/DL (ref 3.5–5.2)
ALBUMIN/GLOB SERPL: 1.3 G/DL
ALP SERPL-CCNC: 85 U/L (ref 39–117)
ALT SERPL W P-5'-P-CCNC: 9 U/L (ref 1–33)
ANION GAP SERPL CALCULATED.3IONS-SCNC: 9.1 MMOL/L (ref 5–15)
AST SERPL-CCNC: 17 U/L (ref 1–32)
BASOPHILS # BLD AUTO: 0.05 10*3/MM3 (ref 0–0.2)
BASOPHILS NFR BLD AUTO: 0.8 % (ref 0–1.5)
BILIRUB SERPL-MCNC: 0.4 MG/DL (ref 0–1.2)
BUN SERPL-MCNC: 11 MG/DL (ref 8–23)
BUN/CREAT SERPL: 11.7 (ref 7–25)
CALCIUM SPEC-SCNC: 8.7 MG/DL (ref 8.6–10.5)
CHLORIDE SERPL-SCNC: 108 MMOL/L (ref 98–107)
CO2 SERPL-SCNC: 26.9 MMOL/L (ref 22–29)
CREAT SERPL-MCNC: 0.94 MG/DL (ref 0.57–1)
DEPRECATED RDW RBC AUTO: 43.3 FL (ref 37–54)
EOSINOPHIL # BLD AUTO: 0.22 10*3/MM3 (ref 0–0.4)
EOSINOPHIL NFR BLD AUTO: 3.5 % (ref 0.3–6.2)
ERYTHROCYTE [DISTWIDTH] IN BLOOD BY AUTOMATED COUNT: 12.8 % (ref 12.3–15.4)
GFR SERPL CREATININE-BSD FRML MDRD: 60 ML/MIN/1.73
GLOBULIN UR ELPH-MCNC: 2.9 GM/DL
GLUCOSE SERPL-MCNC: 101 MG/DL (ref 65–99)
HCT VFR BLD AUTO: 36.4 % (ref 34–46.6)
HCV AB SER DONR QL: NORMAL
HGB BLD-MCNC: 12.1 G/DL (ref 12–15.9)
IMM GRANULOCYTES # BLD AUTO: 0.03 10*3/MM3 (ref 0–0.05)
IMM GRANULOCYTES NFR BLD AUTO: 0.5 % (ref 0–0.5)
LYMPHOCYTES # BLD AUTO: 1.2 10*3/MM3 (ref 0.7–3.1)
LYMPHOCYTES NFR BLD AUTO: 19 % (ref 19.6–45.3)
MCH RBC QN AUTO: 31.3 PG (ref 26.6–33)
MCHC RBC AUTO-ENTMCNC: 33.2 G/DL (ref 31.5–35.7)
MCV RBC AUTO: 94.3 FL (ref 79–97)
MONOCYTES # BLD AUTO: 0.64 10*3/MM3 (ref 0.1–0.9)
MONOCYTES NFR BLD AUTO: 10.1 % (ref 5–12)
NEUTROPHILS NFR BLD AUTO: 4.18 10*3/MM3 (ref 1.7–7)
NEUTROPHILS NFR BLD AUTO: 66.1 % (ref 42.7–76)
NRBC BLD AUTO-RTO: 0 /100 WBC (ref 0–0.2)
PLATELET # BLD AUTO: 251 10*3/MM3 (ref 140–450)
PMV BLD AUTO: 10 FL (ref 6–12)
POTASSIUM SERPL-SCNC: 3.9 MMOL/L (ref 3.5–5.2)
PROT SERPL-MCNC: 6.8 G/DL (ref 6–8.5)
RBC # BLD AUTO: 3.86 10*6/MM3 (ref 3.77–5.28)
SODIUM SERPL-SCNC: 144 MMOL/L (ref 136–145)
TSH SERPL DL<=0.05 MIU/L-ACNC: 5.28 UIU/ML (ref 0.27–4.2)
VIT B12 BLD-MCNC: 285 PG/ML (ref 211–946)
WBC # BLD AUTO: 6.32 10*3/MM3 (ref 3.4–10.8)

## 2021-07-30 PROCEDURE — 80053 COMPREHEN METABOLIC PANEL: CPT

## 2021-07-30 PROCEDURE — 84443 ASSAY THYROID STIM HORMONE: CPT

## 2021-07-30 PROCEDURE — 86803 HEPATITIS C AB TEST: CPT

## 2021-07-30 PROCEDURE — 82607 VITAMIN B-12: CPT

## 2021-07-30 PROCEDURE — 36415 COLL VENOUS BLD VENIPUNCTURE: CPT

## 2021-07-30 PROCEDURE — 82306 VITAMIN D 25 HYDROXY: CPT

## 2021-07-30 PROCEDURE — 99214 OFFICE O/P EST MOD 30 MIN: CPT | Performed by: NURSE PRACTITIONER

## 2021-07-30 PROCEDURE — 85025 COMPLETE CBC W/AUTO DIFF WBC: CPT

## 2021-08-02 DIAGNOSIS — E07.9 DISEASE OF THYROID GLAND: Primary | ICD-10-CM

## 2021-08-02 RX ORDER — LEVOTHYROXINE SODIUM 0.05 MG/1
50 TABLET ORAL DAILY
Qty: 90 TABLET | Refills: 1 | Status: SHIPPED | OUTPATIENT
Start: 2021-08-02 | End: 2022-01-25

## 2021-08-17 ENCOUNTER — PREP FOR SURGERY (OUTPATIENT)
Dept: OTHER | Facility: HOSPITAL | Age: 61
End: 2021-08-17

## 2021-08-17 DIAGNOSIS — Z80.0 FAMILY HISTORY OF COLON CANCER: Primary | ICD-10-CM

## 2021-08-25 ENCOUNTER — TELEPHONE (OUTPATIENT)
Dept: FAMILY MEDICINE CLINIC | Age: 61
End: 2021-08-25

## 2021-08-25 NOTE — TELEPHONE ENCOUNTER
Caller: Marguerite Abarca    Relationship: Self    Best call back number: [phone]    What medication are you requesting: ZPACK    What are your current symptoms: RUNNY NOSE AND COUGH  How long have you been experiencing symptoms: 8/23/21  Have you had these symptoms before:    [x] Yes  [] No    Have you been treated for these symptoms before:   [x] Yes  [] No    If a prescription is needed, what is your preferred pharmacy and phone number:    [facility]  [address]

## 2021-08-26 ENCOUNTER — TELEPHONE (OUTPATIENT)
Dept: FAMILY MEDICINE CLINIC | Age: 61
End: 2021-08-26

## 2021-08-26 ENCOUNTER — OFFICE VISIT (OUTPATIENT)
Dept: FAMILY MEDICINE CLINIC | Age: 61
End: 2021-08-26

## 2021-08-26 VITALS
DIASTOLIC BLOOD PRESSURE: 95 MMHG | HEART RATE: 79 BPM | WEIGHT: 166.8 LBS | BODY MASS INDEX: 30.51 KG/M2 | OXYGEN SATURATION: 98 % | SYSTOLIC BLOOD PRESSURE: 180 MMHG | TEMPERATURE: 99 F

## 2021-08-26 DIAGNOSIS — R05.9 COUGH: Primary | ICD-10-CM

## 2021-08-26 DIAGNOSIS — U07.1 COVID-19: ICD-10-CM

## 2021-08-26 LAB
EXPIRATION DATE: ABNORMAL
FLUAV AG UPPER RESP QL IA.RAPID: NOT DETECTED
FLUBV AG UPPER RESP QL IA.RAPID: NOT DETECTED
INTERNAL CONTROL: ABNORMAL
Lab: ABNORMAL
SARS-COV-2 AG UPPER RESP QL IA.RAPID: DETECTED

## 2021-08-26 PROCEDURE — 87428 SARSCOV & INF VIR A&B AG IA: CPT | Performed by: NURSE PRACTITIONER

## 2021-08-26 PROCEDURE — 99213 OFFICE O/P EST LOW 20 MIN: CPT | Performed by: NURSE PRACTITIONER

## 2021-08-26 NOTE — TELEPHONE ENCOUNTER
Hub to read    Pt would like a call back from nurse, pt is not very happy with care today. 08/26/2021  [phone]

## 2021-08-26 NOTE — ASSESSMENT & PLAN NOTE
COVID +, handout given on + covid guidelines, quarantine, rest, increase fluids, continue mucinex, she asked me for a zpack said it helped last  when she had covid then, I will le there PCP know of her + covid test and to let her know if anything changes/worsens   (copy of her lab given) discussed covid vaccines and recommended the vaccine for her in the future, can discuss with her PCP

## 2021-08-26 NOTE — PROGRESS NOTES
Marguerite Abarca presents to [facility].    Chief Complaint:  Nasal Congestion (watch a baby over the weekend who has RSV, started with cold like sx on tuesday)         History of Present Illness:  URI  When did symptoms started: 2-3 days ago  Any exposures: watched a baby, 3 months old, + RSV last weekend, she denies any other known covid exposure and does not want the covid vaccines  Symptoms: cough, nasal congestion, runny nose    Treatment tried:Mucinex and OTC cough suppressant at times   CXR 11-24-21 bilateral opacities, pt reports she was COVID + then       PMH changes: now on thyroid rx / had labs last month            Review of Systems:  Review of Systems   Constitutional: Negative for fever.   HENT: Negative for sore throat.    Respiratory: Negative for shortness of breath.    Cardiovascular: Negative for chest pain.   Neurological:        No loss of taste or smell           Vital Signs:   /95 (BP Location: Right arm, Patient Position: Sitting, Cuff Size: Adult)   Pulse 79   Temp 99 °F (37.2 °C) (Oral)   Wt 75.7 kg (166 lb 12.8 oz)   SpO2 98%   BMI 30.51 kg/m²       Physical Exam:  Physical Exam  Constitutional:       General: She is not in acute distress.     Appearance: Normal appearance.   HENT:      Right Ear: Tympanic membrane, ear canal and external ear normal.      Left Ear: Tympanic membrane, ear canal and external ear normal.      Nose: Nose normal.      Mouth/Throat:      Pharynx: Oropharynx is clear. No posterior oropharyngeal erythema.   Cardiovascular:      Rate and Rhythm: Normal rate and regular rhythm.      Heart sounds: No murmur heard.     Pulmonary:      Effort: Pulmonary effort is normal.      Breath sounds: Normal breath sounds.   Lymphadenopathy:      Cervical: No cervical adenopathy.   Neurological:      Mental Status: She is alert.   Psychiatric:         Mood and Affect: Mood normal.         Behavior: Behavior normal.         Result  Review      The following data was reviewed by: JACY Miranda on 08/26/2021:    Results for orders placed or performed in visit on 08/26/21   POCT SARS-CoV-2 Antigen GUTIERREZ    Specimen: Swab   Result Value Ref Range    SARS Antigen Detected (A) Not Detected    Influenza A Antigen GUTIERREZ Not Detected     Influenza B Antigen GUTIERREZ Not Detected     Internal Control Passed Passed    Lot Number [id number]     Expiration Date 10/4/2022                Assessment and Plan:          Diagnoses and all orders for this visit:    1. Cough (Primary)  Assessment & Plan:  Rest, increase fluids, rapid flu tests negative     Orders:  -     POCT SARS-CoV-2 Antigen GUTIERREZ    2. COVID-19  Assessment & Plan:  COVID +, handout given on + covid guidelines, quarantine, rest, increase fluids, continue mucinex, she asked me for a zpack said it helped last  when she had covid then, I will le there PCP know of her + covid test and to let her know if anything changes/worsens   (copy of her lab given) discussed covid vaccines and recommended the vaccine for her in the future, can discuss with her PCP           Follow Up   Return if symptoms worsen or fail to improve.  Patient was given instructions and counseling regarding her condition or for health maintenance advice. Please see specific information pulled into the AVS if appropriate.

## 2021-08-27 ENCOUNTER — TELEPHONE (OUTPATIENT)
Dept: FAMILY MEDICINE CLINIC | Age: 61
End: 2021-08-27

## 2021-08-27 DIAGNOSIS — R05.9 COUGH: Primary | ICD-10-CM

## 2021-08-27 DIAGNOSIS — U07.1 COVID-19: Primary | ICD-10-CM

## 2021-08-27 RX ORDER — AZITHROMYCIN 250 MG/1
TABLET, FILM COATED ORAL
Qty: 6 TABLET | Refills: 0 | Status: SHIPPED | OUTPATIENT
Start: 2021-08-27 | End: 2021-12-22

## 2021-08-27 RX ORDER — DEXTROMETHORPHAN HYDROBROMIDE AND PROMETHAZINE HYDROCHLORIDE 15; 6.25 MG/5ML; MG/5ML
5 SYRUP ORAL 4 TIMES DAILY PRN
Qty: 240 ML | Refills: 0 | Status: SHIPPED | OUTPATIENT
Start: 2021-08-27 | End: 2021-12-22

## 2021-08-27 NOTE — TELEPHONE ENCOUNTER
Covid is a virus and typically Zpacks are used to treat bacterial infections. I will however go ahead and send a rx to pharmacy for a Zpack. I would continue to quarantine as advised with the positive covid test.

## 2021-08-27 NOTE — TELEPHONE ENCOUNTER
Caller: Marguerite Abarca    Relationship: Self    Best call back number: [phone]    What is the best time to reach you: ANYTIME    Who are you requesting to speak with (clinical staff, provider,  specific staff member): NURSE OR JACY GONZALEZ     What was the call regarding: PATIENT WOULD LIKE TO GO AHEAD AND GET SOME COUGH SYRUP BEFORE THE WEEKEND.     Do you require a callback: YES

## 2021-08-27 NOTE — TELEPHONE ENCOUNTER
Pt is upset that Cristiane did not give her a zpack yesterday, was tested for COVID and tested positive, pt states she has been taking Mucinex to help, but thinks a zpack will help get rid of whatever she has, pt states she thinks her results for COVID are wrong.

## 2021-09-20 ENCOUNTER — PREP FOR SURGERY (OUTPATIENT)
Dept: OTHER | Facility: HOSPITAL | Age: 61
End: 2021-09-20

## 2021-09-20 DIAGNOSIS — Z80.0 FAMILY HISTORY OF COLON CANCER: ICD-10-CM

## 2021-09-20 DIAGNOSIS — Z12.11 SCREEN FOR COLON CANCER: Primary | ICD-10-CM

## 2021-12-02 RX ORDER — ESTRADIOL 0.04 MG/D
FILM, EXTENDED RELEASE TRANSDERMAL
Qty: 48 PATCH | Refills: 0 | Status: SHIPPED | OUTPATIENT
Start: 2021-12-02 | End: 2022-02-28 | Stop reason: SDUPTHER

## 2021-12-08 PROBLEM — Z12.11 SCREEN FOR COLON CANCER: Status: ACTIVE | Noted: 2021-12-08

## 2021-12-08 PROBLEM — Z80.0 FAMILY HISTORY OF COLON CANCER: Status: ACTIVE | Noted: 2021-12-08

## 2021-12-22 ENCOUNTER — OFFICE VISIT (OUTPATIENT)
Dept: FAMILY MEDICINE CLINIC | Age: 61
End: 2021-12-22

## 2021-12-22 VITALS
WEIGHT: 168 LBS | HEIGHT: 62 IN | SYSTOLIC BLOOD PRESSURE: 180 MMHG | OXYGEN SATURATION: 100 % | TEMPERATURE: 98.3 F | BODY MASS INDEX: 30.91 KG/M2 | DIASTOLIC BLOOD PRESSURE: 78 MMHG | HEART RATE: 72 BPM

## 2021-12-22 DIAGNOSIS — R03.0 ELEVATED BLOOD PRESSURE READING: ICD-10-CM

## 2021-12-22 DIAGNOSIS — J32.9 SINUSITIS, UNSPECIFIED CHRONICITY, UNSPECIFIED LOCATION: Primary | ICD-10-CM

## 2021-12-22 PROBLEM — Z90.710 HISTORY OF HYSTERECTOMY: Status: ACTIVE | Noted: 2021-12-22

## 2021-12-22 PROBLEM — U07.1 COVID-19: Status: RESOLVED | Noted: 2021-08-26 | Resolved: 2021-12-22

## 2021-12-22 PROCEDURE — 99213 OFFICE O/P EST LOW 20 MIN: CPT | Performed by: NURSE PRACTITIONER

## 2021-12-22 RX ORDER — DEXTROMETHORPHAN HYDROBROMIDE AND PROMETHAZINE HYDROCHLORIDE 15; 6.25 MG/5ML; MG/5ML
5 SYRUP ORAL 4 TIMES DAILY PRN
Qty: 240 ML | Refills: 0 | Status: SHIPPED | OUTPATIENT
Start: 2021-12-22 | End: 2022-02-17

## 2021-12-22 RX ORDER — AZITHROMYCIN 250 MG/1
TABLET, FILM COATED ORAL
Qty: 6 TABLET | Refills: 0 | Status: SHIPPED | OUTPATIENT
Start: 2021-12-22 | End: 2022-02-17

## 2021-12-22 NOTE — PROGRESS NOTES
Chief Complaint  Marguerite Abarca presents to [facility] for URI (pt refused testing)    Subjective          History of Present Illness    Marguerite is here today with c/o runny nose, sinus congestion, PND, cough after being out in the cold. This has been going on for over one week. Has taken Mucinex and OTC cold and flu medication. Still with some PND and cough but has improved. No known ill contacts. History of covid with different symptoms so declines testing today.     Review of Systems      Allergies   Allergen Reactions   • Nsaids Unknown - Low Severity     States cannot take nsaids due to home med interaction      Past Medical History:   Diagnosis Date   • GERD (gastroesophageal reflux disease)    • Herpes genitalis 2018    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 Lesion in the right interlabial sulcus. Min symptoms, resolved in couple days.  No lymphadenopathy.  Probable recurrence of old infection, discussed IgG and IgM testing to determine if recent infection.   • Osteoporosis    • Rheumatoid arthritis (HCC)     Dr Chava Espinal; Methotrexate,  Decreased relafen 12/1/2017.   • Vitamin D deficiency      Current Outpatient Medications   Medication Sig Dispense Refill   • estradiol (VIVELLE-DOT) 0.0375 MG/24HR patch PLACE 1 PATCH ON THE SKIN TWICE WEEKLY 48 patch 0   • folic acid (FOLVITE) 1 MG tablet Take 1 mg by mouth daily.     • levothyroxine (Synthroid) 50 MCG tablet Take 1 tablet by mouth Daily. 90 tablet 1   • methotrexate 2.5 MG tablet Take 15 mg by mouth 1 (One) Time Per Week. Thursday TAKES 2.5 MG 6 TABS     • nabumetone (RELAFEN) 750 MG tablet Take 750 mg by mouth 2 (Two) Times a Day. HOLD PRIOR TO SURGERY     • pantoprazole (PROTONIX) 40 MG EC tablet Take 1 tablet by mouth 2 (Two) Times a Day Before Meals. (Patient taking differently: Take 40 mg by mouth Every Night.) 60 tablet 2   • valACYclovir (VALTREX) 500 MG tablet Take 500 mg by mouth 2 (Two) Times a Day As Needed.     • vitamin D  (ERGOCALCIFEROL) 73895 UNITS capsule capsule Take 50,000 Units by mouth 1 (One) Time Per Week. THURSDAY     • azithromycin (Zithromax Z-Mert) 250 MG tablet Take 2 tablets by mouth on day 1, then 1 tablet daily on days 2-5 6 tablet 0   • promethazine-dextromethorphan (PROMETHAZINE-DM) 6.25-15 MG/5ML syrup Take 5 mL by mouth 4 (Four) Times a Day As Needed for Cough. 240 mL 0     No current facility-administered medications for this visit.     Past Surgical History:   Procedure Laterality Date   • ANTERIOR AND POSTERIOR VAGINAL REPAIR N/A 8/7/2018    Procedure: POSTERIOR VAGINAL REPAIR MID URETHRAL SLING CYSTOSCOPY;  Surgeon: David Pelayo MD;  Location: [facility] OR;  Service: Gynecology   • CHOLECYSTECTOMY WITH INTRAOPERATIVE CHOLANGIOGRAM N/A 2/16/2018    Procedure: CHOLECYSTECTOMY LAPAROSCOPIC ;  Surgeon: Silvano Rushing MD;  Location: [facility] OR [facility];  Service:    • COLONOSCOPY N/A 8/25/2016    Diverticulosis in the sigmoid colon, The examination was otherwise normal on direct and retoflexion views   • COLONOSCOPY N/A 05/07/2010    Diverticulosis sigmoid colon, otherwise normal-Dr. Arnold Mcneal   • COLONOSCOPY N/A 05/04/2007    One 7 mm polyp in the distal sigmoid colon-Dr. Arnold Mcneal   • CYST REMOVAL Bilateral     Both wrists, benign.  Sounds like ganglion cyst.   • DIAGNOSTIC LAPAROSCOPY  1983    Exploratory Laparoscopy with myomectomy (twice) and cautery of endometriosis. The ovaries were preserved.   • ENDOSCOPY N/A 12/30/2017    Procedure: ESOPHAGOGASTRODUODENOSCOPY with biopsy; no H. pylori on biopsy.  Surgeon: Ignacio Rodriguez MD;  Location: [facility] ENDOSCOPY;  Service:    • EXCISION TUMOR NECK / THORAX  1987     Removal of Tumor from her back that was Benign, Lumbo-Sacral area.    • HEMORRHOIDECTOMY N/A 05/18/2010    w/ fissurectomy-Dr. Arnold Mcneal   • MYOMECTOMY  1995    Exploratory Laparoscopy with myomectomy (twice) and cautery of endometriosis. The ovaries were preserved.   • SACROSPINUS  "SUSPENSION N/A 2020    Procedure: SACROSPINUS LIGAMENT SUSPENSION REPAIR CYSTOSCOPY;  Surgeon: David Pelayo MD;  Location: [facility];  Service: Gynecology;  Laterality: N/A;   • SINUS SURGERY     • TOTAL ABDOMINAL HYSTERECTOMY WITH SALPINGO OOPHORECTOMY Bilateral 1999    Total Abdominal Hysterectomy with Removal of Both Tubes and Ovaries. for chronic pelvic pain and adhesions.   • VOCAL CORD BIOPSY Right 2003    Microlaryngoscopy, biopsy of right true cord-Dr. Vamshi Barron      Social History     Tobacco Use   • Smoking status: Never Smoker   • Smokeless tobacco: Never Used   Vaping Use   • Vaping Use: Never used   Substance Use Topics   • Alcohol use: Yes     Comment: socially   • Drug use: No     Family History   Problem Relation Age of Onset   • Valvular heart disease Father          of CHF.   • Colon cancer Father    • Colon polyps Sister    • COPD Mother    • No Known Problems Brother    • No Known Problems Maternal Grandmother    • No Known Problems Maternal Grandfather    • No Known Problems Paternal Grandmother    • No Known Problems Paternal Grandfather    • No Known Problems Maternal Aunt         x3   • No Known Problems Paternal Aunt         x3   • Kidney disease Maternal Uncle    • BRCA 1/2 Neg Hx    • Breast cancer Neg Hx    • Endometrial cancer Neg Hx    • Ovarian cancer Neg Hx    • Malig Hyperthermia Neg Hx    • Uterine cancer Neg Hx      Health Maintenance Due   Topic Date Due   • PAP SMEAR  2021      Immunization History   Administered Date(s) Administered   • Flucelvax Quad Vial =>4yrs 10/01/2020   • flucelvax quad pfs =>4 YRS 10/01/2020        Objective     Vitals:    21 1335 21 1338   BP: (!) 155/108 180/78   Pulse: 72 72   Temp: 98.3 °F (36.8 °C)    SpO2: 100%    Weight: 76.2 kg (168 lb)    Height: 157.5 cm (62\")      Body mass index is 30.73 kg/m².     Physical Exam  Vitals reviewed.   Constitutional:       General: She is not in acute distress.     " Appearance: Normal appearance. She is well-developed.   HENT:      Head: Normocephalic and atraumatic.      Right Ear: Tympanic membrane and ear canal normal.      Left Ear: Tympanic membrane and ear canal normal.      Nose: Congestion present.      Right Sinus: Maxillary sinus tenderness and frontal sinus tenderness present.      Left Sinus: Maxillary sinus tenderness and frontal sinus tenderness present.   Eyes:      Extraocular Movements: Extraocular movements intact.      Pupils: Pupils are equal, round, and reactive to light.   Cardiovascular:      Rate and Rhythm: Normal rate and regular rhythm.   Pulmonary:      Effort: Pulmonary effort is normal.      Breath sounds: Normal breath sounds.   Neurological:      Mental Status: She is alert and oriented to person, place, and time.   Psychiatric:         Mood and Affect: Mood and affect normal.           Result Review :     The following data was reviewed by: JACY Nazario on 12/22/2021:          Vitamin B12 (07/30/2021 16:04)  Hepatitis C Antibody (07/30/2021 16:04)  CBC Auto Differential (07/30/2021 16:04)  Comprehensive Metabolic Panel (07/30/2021 16:04)  TSH (07/30/2021 16:04)  Vitamin D 25 Hydroxy (07/30/2021 16:04)                  Assessment and Plan      Diagnoses and all orders for this visit:    1. Sinusitis, unspecified chronicity, unspecified location (Primary)  Comments:  Declines covid testing. Advised rest, fluids.   Orders:  -     Cancel: POCT SARS-CoV-2 Antigen GUTIERREZ + Flu  -     azithromycin (Zithromax Z-Mert) 250 MG tablet; Take 2 tablets by mouth on day 1, then 1 tablet daily on days 2-5  Dispense: 6 tablet; Refill: 0  -     promethazine-dextromethorphan (PROMETHAZINE-DM) 6.25-15 MG/5ML syrup; Take 5 mL by mouth 4 (Four) Times a Day As Needed for Cough.  Dispense: 240 mL; Refill: 0    2. Elevated blood pressure reading  Comments:  Reports BP at home well controlled. Continue to monitor and will recheck in office next month.                Follow Up     Return for Chronic visit follow up.

## 2022-01-04 ENCOUNTER — CLINICAL SUPPORT (OUTPATIENT)
Dept: FAMILY MEDICINE CLINIC | Age: 62
End: 2022-01-04

## 2022-01-04 DIAGNOSIS — Z23 NEED FOR INFLUENZA VACCINATION: Primary | ICD-10-CM

## 2022-01-04 PROCEDURE — 90471 IMMUNIZATION ADMIN: CPT | Performed by: FAMILY MEDICINE

## 2022-01-04 PROCEDURE — 90686 IIV4 VACC NO PRSV 0.5 ML IM: CPT | Performed by: FAMILY MEDICINE

## 2022-01-24 DIAGNOSIS — E07.9 DISEASE OF THYROID GLAND: ICD-10-CM

## 2022-01-25 RX ORDER — LEVOTHYROXINE SODIUM 0.05 MG/1
TABLET ORAL
Qty: 30 TABLET | Refills: 0 | Status: SHIPPED | OUTPATIENT
Start: 2022-01-25 | End: 2022-02-17 | Stop reason: SDUPTHER

## 2022-02-17 ENCOUNTER — OFFICE VISIT (OUTPATIENT)
Dept: FAMILY MEDICINE CLINIC | Age: 62
End: 2022-02-17

## 2022-02-17 VITALS
SYSTOLIC BLOOD PRESSURE: 143 MMHG | OXYGEN SATURATION: 100 % | BODY MASS INDEX: 30.55 KG/M2 | HEIGHT: 62 IN | DIASTOLIC BLOOD PRESSURE: 66 MMHG | TEMPERATURE: 98.3 F | HEART RATE: 72 BPM | WEIGHT: 166 LBS

## 2022-02-17 DIAGNOSIS — Z00.00 ANNUAL PHYSICAL EXAM: Primary | ICD-10-CM

## 2022-02-17 DIAGNOSIS — E07.9 DISEASE OF THYROID GLAND: ICD-10-CM

## 2022-02-17 DIAGNOSIS — M06.9 RHEUMATOID ARTHRITIS, INVOLVING UNSPECIFIED SITE, UNSPECIFIED WHETHER RHEUMATOID FACTOR PRESENT: ICD-10-CM

## 2022-02-17 PROCEDURE — 99396 PREV VISIT EST AGE 40-64: CPT | Performed by: NURSE PRACTITIONER

## 2022-02-17 RX ORDER — LEVOTHYROXINE SODIUM 0.05 MG/1
50 TABLET ORAL DAILY
Qty: 90 TABLET | Refills: 1 | Status: SHIPPED | OUTPATIENT
Start: 2022-02-17 | End: 2022-08-23

## 2022-02-17 NOTE — PROGRESS NOTES
Chief Complaint  Marguerite Abarca presents to [facility] for Annual Exam    Subjective          History of Present Illness    Marguerite is here today for annual preventive exam.   UTD influenza vaccine and PNA vaccine.   Declines zoster, covid, Tdap vaccines.  She had normal screening mammogram 1/4/2021 ordered by GYN. She is scheduled 2/25/22.   Pap smear no longer indicated due to history of hysterectomy. She has well woman exam scheduled later this month.   Colonoscopy 8/25/2016 with diverticulosis. Advised repeat 5 years. Scheduled next month.   Thinks that she has had bone density scan with rheumatologist.  Seeing dentist and optometrist regularly.   Has been monitoring her BP at home and reports running well.   She is followed by rheumatology for her rheumatoid arthritis. Doing well.   History of hypothyroidism. Current medication includes levothyroxine 50 mcg daily.   Reports history of allergies. Had allergy testing and was on allergy shots previously. Advised by allergist to stop and they were not helping her. Antihistamines have also not helped in the past.       Review of Systems   Constitutional: Negative for chills and fever.   HENT: Negative for ear pain and sore throat.    Eyes: Negative for blurred vision and redness.   Respiratory: Negative for cough, shortness of breath and wheezing.    Cardiovascular: Negative for chest pain and palpitations.   Gastrointestinal: Negative for anal bleeding and vomiting.   Genitourinary: Negative for dysuria, frequency and urgency.   Musculoskeletal: Negative for back pain and joint swelling.   Skin: Negative for rash.   Neurological: Negative for dizziness and headache.   Psychiatric/Behavioral: Negative for suicidal ideas and depressed mood.         Allergies   Allergen Reactions   • Nsaids Unknown - Low Severity     States cannot take nsaids due to home med interaction      Past Medical History:   Diagnosis Date   • GERD  (gastroesophageal reflux disease)    • Herpes genitalis 2018    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 Lesion in the right interlabial sulcus. Min symptoms, resolved in couple days.  No lymphadenopathy.  Probable recurrence of old infection, discussed IgG and IgM testing to determine if recent infection.   • Osteoporosis    • Rheumatoid arthritis (HCC)     Dr Chava Espinal; Methotrexate,  Decreased relafen 12/1/2017.   • Vitamin D deficiency      Current Outpatient Medications   Medication Sig Dispense Refill   • estradiol (VIVELLE-DOT) 0.0375 MG/24HR patch PLACE 1 PATCH ON THE SKIN TWICE WEEKLY 48 patch 0   • folic acid (FOLVITE) 1 MG tablet Take 1 mg by mouth daily.     • levothyroxine (SYNTHROID, LEVOTHROID) 50 MCG tablet Take 1 tablet by mouth Daily. 90 tablet 1   • methotrexate 2.5 MG tablet Take 15 mg by mouth 1 (One) Time Per Week. Thursday TAKES 2.5 MG 6 TABS     • nabumetone (RELAFEN) 750 MG tablet Take 750 mg by mouth 2 (Two) Times a Day. HOLD PRIOR TO SURGERY     • pantoprazole (PROTONIX) 40 MG EC tablet Take 1 tablet by mouth 2 (Two) Times a Day Before Meals. (Patient taking differently: Take 40 mg by mouth Every Night.) 60 tablet 2   • valACYclovir (VALTREX) 500 MG tablet Take 500 mg by mouth 2 (Two) Times a Day As Needed.     • vitamin D (ERGOCALCIFEROL) 27528 UNITS capsule capsule Take 50,000 Units by mouth 1 (One) Time Per Week. THURSDAY       No current facility-administered medications for this visit.     Past Surgical History:   Procedure Laterality Date   • ANTERIOR AND POSTERIOR VAGINAL REPAIR N/A 8/7/2018    Procedure: POSTERIOR VAGINAL REPAIR MID URETHRAL SLING CYSTOSCOPY;  Surgeon: David Pelayo MD;  Location: [facility] OR;  Service: Gynecology   • CHOLECYSTECTOMY WITH INTRAOPERATIVE CHOLANGIOGRAM N/A 2/16/2018    Procedure: CHOLECYSTECTOMY LAPAROSCOPIC ;  Surgeon: Silvano Rushing MD;  Location: [facility];  Service:    • COLONOSCOPY N/A 8/25/2016    Diverticulosis in the sigmoid colon, The examination was  otherwise normal on direct and retoflexion views   • COLONOSCOPY N/A 2010    Diverticulosis sigmoid colon, otherwise normal-Dr. Arnold Mcneal   • COLONOSCOPY N/A 2007    One 7 mm polyp in the distal sigmoid colon-Dr. Arnold Mcneal   • CYST REMOVAL Bilateral     Both wrists, benign.  Sounds like ganglion cyst.   • DIAGNOSTIC LAPAROSCOPY      Exploratory Laparoscopy with myomectomy (twice) and cautery of endometriosis. The ovaries were preserved.   • ENDOSCOPY N/A 2017    Procedure: ESOPHAGOGASTRODUODENOSCOPY with biopsy; no H. pylori on biopsy.  Surgeon: Ignacio Rodriguez MD;  Location: [facility] ENDOSCOPY;  Service:    • EXCISION TUMOR NECK / THORAX       Removal of Tumor from her back that was Benign, Lumbo-Sacral area.    • HEMORRHOIDECTOMY N/A 2010    w/ fissurectomy-Dr. Arnold Mcneal   • MYOMECTOMY      Exploratory Laparoscopy with myomectomy (twice) and cautery of endometriosis. The ovaries were preserved.   • SACROSPINUS SUSPENSION N/A 2020    Procedure: SACROSPINUS LIGAMENT SUSPENSION REPAIR CYSTOSCOPY;  Surgeon: David Pelayo MD;  Location: [facility];  Service: Gynecology;  Laterality: N/A;   • SINUS SURGERY     • TOTAL ABDOMINAL HYSTERECTOMY WITH SALPINGO OOPHORECTOMY Bilateral     Total Abdominal Hysterectomy with Removal of Both Tubes and Ovaries. for chronic pelvic pain and adhesions.   • VOCAL CORD BIOPSY Right 2003    Microlaryngoscopy, biopsy of right true cord-Dr. Vamshi Barron      Social History     Tobacco Use   • Smoking status: Never Smoker   • Smokeless tobacco: Never Used   Vaping Use   • Vaping Use: Never used   Substance Use Topics   • Alcohol use: Yes     Comment: socially   • Drug use: No     Family History   Problem Relation Age of Onset   • Valvular heart disease Father          of CHF.   • Colon cancer Father    • Colon polyps Sister    • COPD Mother    • No Known Problems Brother    • No Known Problems Maternal Grandmother   "  • No Known Problems Maternal Grandfather    • No Known Problems Paternal Grandmother    • No Known Problems Paternal Grandfather    • No Known Problems Maternal Aunt         x3   • No Known Problems Paternal Aunt         x3   • Kidney disease Maternal Uncle    • BRCA 1/2 Neg Hx    • Breast cancer Neg Hx    • Endometrial cancer Neg Hx    • Ovarian cancer Neg Hx    • Malig Hyperthermia Neg Hx    • Uterine cancer Neg Hx      There are no preventive care reminders to display for this patient.   Immunization History   Administered Date(s) Administered   • FluLaval/Fluarix/Fluzone >6 01/04/2022   • Flucelvax Quad Vial =>4yrs 10/01/2020   • Pneumococcal Polysaccharide (PPSV23) 02/24/2021   • flucelvax quad pfs =>4 YRS 10/01/2020        Objective     Vitals:    02/17/22 1153   BP: 143/66   Pulse: 72   Temp: 98.3 °F (36.8 °C)   SpO2: 100%   Weight: 75.3 kg (166 lb)   Height: 157.5 cm (62\")     Body mass index is 30.36 kg/m².     Physical Exam  Vitals reviewed.   Constitutional:       General: She is not in acute distress.     Appearance: Normal appearance. She is well-developed.   HENT:      Head: Normocephalic and atraumatic.      Right Ear: Hearing, tympanic membrane and ear canal normal.      Left Ear: Hearing, tympanic membrane and ear canal normal.      Mouth/Throat:      Mouth: Mucous membranes are moist.   Eyes:      Extraocular Movements: Extraocular movements intact.      Pupils: Pupils are equal, round, and reactive to light.   Cardiovascular:      Rate and Rhythm: Normal rate and regular rhythm.      Pulses: Normal pulses.      Heart sounds: Normal heart sounds.   Pulmonary:      Effort: Pulmonary effort is normal.      Breath sounds: Normal breath sounds.   Abdominal:      General: Bowel sounds are normal.      Palpations: Abdomen is soft.      Tenderness: There is no abdominal tenderness.   Musculoskeletal:         General: Normal range of motion.      Cervical back: Normal range of motion and neck supple. "   Skin:     General: Skin is warm and dry.   Neurological:      Mental Status: She is alert and oriented to person, place, and time.   Psychiatric:         Mood and Affect: Mood normal.           Result Review :     The following data was reviewed by: JACY Nazario on 02/17/2022:          Vitamin B12 (07/30/2021 16:04)  Hepatitis C Antibody (07/30/2021 16:04)  CBC Auto Differential (07/30/2021 16:04)  Comprehensive Metabolic Panel (07/30/2021 16:04)  TSH (07/30/2021 16:04)  Vitamin D 25 Hydroxy (07/30/2021 16:04)                  Assessment and Plan      Diagnoses and all orders for this visit:    1. Annual physical exam (Primary)  Comments:  Counseled health maintenance recommendations  Orders:  -     Comprehensive metabolic panel; Future  -     Lipid panel; Future  -     TSH; Future    2. Disease of thyroid gland  Comments:  Rechecking labs. Continue current medication.  Orders:  -     levothyroxine (SYNTHROID, LEVOTHROID) 50 MCG tablet; Take 1 tablet by mouth Daily.  Dispense: 90 tablet; Refill: 1    3. Rheumatoid arthritis, involving unspecified site, unspecified whether rheumatoid factor present (HCC)  Comments:  Continue follow up with rheumatology as per their recommendations.               Follow Up     Return in about 1 year (around 2/17/2023) for Annual physical.

## 2022-02-25 ENCOUNTER — APPOINTMENT (OUTPATIENT)
Dept: WOMENS IMAGING | Facility: HOSPITAL | Age: 62
End: 2022-02-25

## 2022-02-25 ENCOUNTER — PROCEDURE VISIT (OUTPATIENT)
Dept: OBSTETRICS AND GYNECOLOGY | Age: 62
End: 2022-02-25

## 2022-02-25 DIAGNOSIS — Z12.31 VISIT FOR SCREENING MAMMOGRAM: Primary | ICD-10-CM

## 2022-02-25 PROCEDURE — 77067 SCR MAMMO BI INCL CAD: CPT | Performed by: RADIOLOGY

## 2022-02-25 PROCEDURE — 77067 SCR MAMMO BI INCL CAD: CPT | Performed by: OBSTETRICS & GYNECOLOGY

## 2022-02-25 PROCEDURE — 77063 BREAST TOMOSYNTHESIS BI: CPT | Performed by: RADIOLOGY

## 2022-02-25 PROCEDURE — 77063 BREAST TOMOSYNTHESIS BI: CPT | Performed by: OBSTETRICS & GYNECOLOGY

## 2022-02-28 ENCOUNTER — OFFICE VISIT (OUTPATIENT)
Dept: OBSTETRICS AND GYNECOLOGY | Age: 62
End: 2022-02-28

## 2022-02-28 ENCOUNTER — TELEPHONE (OUTPATIENT)
Dept: FAMILY MEDICINE CLINIC | Age: 62
End: 2022-02-28

## 2022-02-28 VITALS
DIASTOLIC BLOOD PRESSURE: 82 MMHG | WEIGHT: 165.4 LBS | HEIGHT: 62 IN | BODY MASS INDEX: 30.44 KG/M2 | SYSTOLIC BLOOD PRESSURE: 140 MMHG

## 2022-02-28 DIAGNOSIS — Z79.890 HORMONE REPLACEMENT THERAPY (HRT): Primary | ICD-10-CM

## 2022-02-28 PROCEDURE — 99396 PREV VISIT EST AGE 40-64: CPT | Performed by: OBSTETRICS & GYNECOLOGY

## 2022-02-28 RX ORDER — AZITHROMYCIN 250 MG/1
TABLET, FILM COATED ORAL
Qty: 6 TABLET | Refills: 0 | Status: SHIPPED | OUTPATIENT
Start: 2022-02-28 | End: 2022-04-06

## 2022-02-28 RX ORDER — ESTRADIOL 0.04 MG/D
1 FILM, EXTENDED RELEASE TRANSDERMAL 2 TIMES WEEKLY
Qty: 48 PATCH | Refills: 1 | Status: SHIPPED | OUTPATIENT
Start: 2022-02-28 | End: 2023-03-20

## 2022-02-28 NOTE — TELEPHONE ENCOUNTER
Pt states she was seen on 2/17/22 and was having issues with sinuses. She has taken otc meds with no improvement, she would like to know if you will send in a zpak to pharm. Please advise.

## 2022-02-28 NOTE — PROGRESS NOTES
Routine Annual Visit    2022    Patient: Marguerite Abarca          MR#:[id number]      Chief Complaint   Patient presents with   • Gynecologic Exam     AE Today, Last AE 2021, Last pap 2018 (-), HPV (-), MG 2022, Colonoscopy scheduled 3/14/2022       History of Present Illness    61 y.o. female  who presents for annual exam.   Hx sacrospinous ligament fixation, no urinary issues lately.   No outbreaks of HSV lately  Hx hysterectomy, normal pap .     On HRT, has tried to discontinue but gets terrible hot flashes, wants to continue  Not SA but happy with her , things are going well. He still drives a semi    Plan to go to [place] this year    Gets six boxes at a time cheaper through [employer].     Health Maintenance  Last pap: , history abnormal: none  Mammogram: friday  Colonoscopy scheduled  Family history of Breast, ovarian, uterine, colon, pancreatic cancer: yes - hx colon cancer for her father.      No LMP recorded (lmp unknown). Patient has had a hysterectomy.  Obstetric History:  OB History        0    Para   0    Term   0       0    AB   0    Living   0       SAB   0    IAB   0    Ectopic   0    Molar        Multiple   0    Live Births                   Menstrual History:     No LMP recorded (lmp unknown). Patient has had a hysterectomy.       ________________________________________  Patient Active Problem List   Diagnosis   • GERD (gastroesophageal reflux disease)   • Vitamin D deficiency   • Uterine fibroid   • Postmenopausal HRT (hormone replacement therapy)   • Rheumatoid arthritis (HCC)   • Mixed stress and urge urinary incontinence   • Cystocele with prolapse   • Vaginal vault prolapse after hysterectomy   • Radiculopathy, lumbar region   • Hypothyroidism, unspecified   • Diverticulosis   • Herpes genitalis   • Osteoporosis   • Family history of colon cancer in father   • History of hysterectomy       Past Medical History:   Diagnosis Date   • GERD  (gastroesophageal reflux disease)    • Herpes genitalis     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 Lesion in the right interlabial sulcus. Min symptoms, resolved in couple days.  No lymphadenopathy.  Probable recurrence of old infection, discussed IgG and IgM testing to determine if recent infection.   • Osteoporosis    • Rheumatoid arthritis (HCC)     Dr Chava Espinal; Methotrexate,  Decreased relafen 2017.   • Vitamin D deficiency        Family History   Problem Relation Age of Onset   • Valvular heart disease Father          of CHF.   • Colon cancer Father    • Colon polyps Sister    • COPD Mother    • No Known Problems Brother    • No Known Problems Maternal Grandmother    • No Known Problems Maternal Grandfather    • No Known Problems Paternal Grandmother    • No Known Problems Paternal Grandfather    • No Known Problems Maternal Aunt         x3   • No Known Problems Paternal Aunt         x3   • Kidney disease Maternal Uncle    • BRCA 1/2 Neg Hx    • Breast cancer Neg Hx    • Endometrial cancer Neg Hx    • Ovarian cancer Neg Hx    • Malig Hyperthermia Neg Hx    • Uterine cancer Neg Hx        Past Surgical History:   Procedure Laterality Date   • ANTERIOR AND POSTERIOR VAGINAL REPAIR N/A 2018    Procedure: POSTERIOR VAGINAL REPAIR MID URETHRAL SLING CYSTOSCOPY;  Surgeon: David Pelayo MD;  Location: [facility] OR;  Service: Gynecology   • CHOLECYSTECTOMY WITH INTRAOPERATIVE CHOLANGIOGRAM N/A 2018    Procedure: CHOLECYSTECTOMY LAPAROSCOPIC ;  Surgeon: Silvano Rushing MD;  Location: [facility];  Service:    • COLONOSCOPY N/A 2016    Diverticulosis in the sigmoid colon, The examination was otherwise normal on direct and retoflexion views   • COLONOSCOPY N/A 2010    Diverticulosis sigmoid colon, otherwise normal-Dr. Arnold Mcneal   • COLONOSCOPY N/A 2007    One 7 mm polyp in the distal sigmoid colon-Dr. Arnold Mcneal   • CYST REMOVAL Bilateral     Both wrists, benign.  Sounds like ganglion  cyst.   • DIAGNOSTIC LAPAROSCOPY  1983    Exploratory Laparoscopy with myomectomy (twice) and cautery of endometriosis. The ovaries were preserved.   • ENDOSCOPY N/A 12/30/2017    Procedure: ESOPHAGOGASTRODUODENOSCOPY with biopsy; no H. pylori on biopsy.  Surgeon: Ignacio Rodriguez MD;  Location: [facility] ENDOSCOPY;  Service:    • EXCISION TUMOR NECK / THORAX  1987     Removal of Tumor from her back that was Benign, Lumbo-Sacral area.    • HEMORRHOIDECTOMY N/A 05/18/2010    w/ fissurectomy-Dr. Arnold Mcneal   • MYOMECTOMY  1995    Exploratory Laparoscopy with myomectomy (twice) and cautery of endometriosis. The ovaries were preserved.   • SACROSPINUS SUSPENSION N/A 2/27/2020    Procedure: SACROSPINUS LIGAMENT SUSPENSION REPAIR CYSTOSCOPY;  Surgeon: David Pelayo MD;  Location: [facility] MAIN OR;  Service: Gynecology;  Laterality: N/A;   • SINUS SURGERY     • TOTAL ABDOMINAL HYSTERECTOMY WITH SALPINGO OOPHORECTOMY Bilateral 1999    Total Abdominal Hysterectomy with Removal of Both Tubes and Ovaries. for chronic pelvic pain and adhesions.   • VOCAL CORD BIOPSY Right 07/31/2003    Microlaryngoscopy, biopsy of right true cord-Dr. Vamshi Barron       Social History     Tobacco Use   Smoking Status Never Smoker   Smokeless Tobacco Never Used       has a current medication list which includes the following prescription(s): azithromycin, estradiol, folic acid, levothyroxine, methotrexate, nabumetone, pantoprazole, valacyclovir, and vitamin d.  ________________________________________      Review of Systems   Constitutional: Negative for fever and unexpected weight change.   Respiratory: Negative for shortness of breath.    Cardiovascular: Negative for chest pain.   Gastrointestinal: Negative for abdominal pain, constipation and diarrhea.   Genitourinary: Negative for frequency and urgency.   Musculoskeletal: Positive for back pain.   Hematological: Negative for adenopathy.   Psychiatric/Behavioral: Negative for dysphoric  "mood.       Objective   Physical Exam    /82   Ht 157.5 cm (62\")   Wt 75 kg (165 lb 6.4 oz)   LMP  (LMP Unknown)   Breastfeeding No   BMI 30.25 kg/m²    BP Readings from Last 3 Encounters:   02/28/22 140/82   02/17/22 143/66   12/22/21 180/78      Wt Readings from Last 3 Encounters:   02/28/22 75 kg (165 lb 6.4 oz)   02/17/22 75.3 kg (166 lb)   12/22/21 76.2 kg (168 lb)         BMI: Body mass index is 30.25 kg/m².       General:   alert, appears stated age and cooperative   Neck: No thyromegaly or LAD   Heart:: regular rate and rhythm, S1, S2 normal, no murmur, click, rub or gallop   Lungs: normal respiratory effort and auscultation   Abdomen: soft, non-tender, without masses or organomegaly   Breast: inspection negative, no nipple discharge or bleeding, no masses or nodularity palpable   Urethra and bladder: urethral meatus normal; bladder nontender to palpation;   Vulva: normal, Bartholin's, Urethra, [place]'s normal   Vagina: normal mucosa, normal discharge, atrophic   Cervix: absent   Uterus: absent    Adnexa: normal adnexa and no mass, fullness, tenderness       Assessment:    normal annual exam   Menopause  Menopausal hot flashes    Plan:    Plan     [x]  Mammogram request made  []  PAP done  []  Labs:   []  GC/Chl/TV  [x]  DEXA scan   []  Referral for colonoscopy:     Desires to continue HRT, reviewed risks of blood clot, stroke, worsening of heart disease and she desires to continue    Counseling  [x]  Nutrition  [x]  Physical activity/regular exercise   [x]  Healthy weight  []  Injury prevention  []  Smoking cessation  []  Substance misuse/abuse  [x]  Sexual behavior  []  STD prevention  []  Contraception  []  Dental health  []  Mental health  [x]  Immunization - declines COVID vaccines  [x]  Encouraged SBE      Ynes Batista MD  02/28/2022  14:18 EST    "

## 2022-03-03 ENCOUNTER — LAB (OUTPATIENT)
Dept: LAB | Facility: HOSPITAL | Age: 62
End: 2022-03-03

## 2022-03-03 DIAGNOSIS — Z00.00 ANNUAL PHYSICAL EXAM: ICD-10-CM

## 2022-03-03 LAB
ALBUMIN SERPL-MCNC: 4.1 G/DL (ref 3.5–5.2)
ALBUMIN/GLOB SERPL: 1.5 G/DL
ALP SERPL-CCNC: 91 U/L (ref 39–117)
ALT SERPL W P-5'-P-CCNC: 14 U/L (ref 1–33)
ANION GAP SERPL CALCULATED.3IONS-SCNC: 10 MMOL/L (ref 5–15)
AST SERPL-CCNC: 11 U/L (ref 1–32)
BILIRUB SERPL-MCNC: 0.4 MG/DL (ref 0–1.2)
BUN SERPL-MCNC: 10 MG/DL (ref 8–23)
BUN/CREAT SERPL: 12.3 (ref 7–25)
CALCIUM SPEC-SCNC: 9 MG/DL (ref 8.6–10.5)
CHLORIDE SERPL-SCNC: 105 MMOL/L (ref 98–107)
CHOLEST SERPL-MCNC: 155 MG/DL (ref 0–200)
CO2 SERPL-SCNC: 26 MMOL/L (ref 22–29)
CREAT SERPL-MCNC: 0.81 MG/DL (ref 0.57–1)
EGFRCR SERPLBLD CKD-EPI 2021: 81.7 ML/MIN/1.73
GLOBULIN UR ELPH-MCNC: 2.8 GM/DL
GLUCOSE SERPL-MCNC: 92 MG/DL (ref 65–99)
HDLC SERPL-MCNC: 38 MG/DL (ref 40–60)
LDLC SERPL CALC-MCNC: 100 MG/DL (ref 0–100)
LDLC/HDLC SERPL: 2.59 {RATIO}
POTASSIUM SERPL-SCNC: 3.6 MMOL/L (ref 3.5–5.2)
PROT SERPL-MCNC: 6.9 G/DL (ref 6–8.5)
SODIUM SERPL-SCNC: 141 MMOL/L (ref 136–145)
TRIGL SERPL-MCNC: 92 MG/DL (ref 0–150)
TSH SERPL DL<=0.05 MIU/L-ACNC: 2.69 UIU/ML (ref 0.27–4.2)
VLDLC SERPL-MCNC: 17 MG/DL (ref 5–40)

## 2022-03-03 PROCEDURE — 80061 LIPID PANEL: CPT

## 2022-03-03 PROCEDURE — 36415 COLL VENOUS BLD VENIPUNCTURE: CPT

## 2022-03-03 PROCEDURE — 80053 COMPREHEN METABOLIC PANEL: CPT

## 2022-03-03 PROCEDURE — 84443 ASSAY THYROID STIM HORMONE: CPT

## 2022-03-14 ENCOUNTER — ANESTHESIA (OUTPATIENT)
Dept: GASTROENTEROLOGY | Facility: HOSPITAL | Age: 62
End: 2022-03-14

## 2022-03-14 ENCOUNTER — HOSPITAL ENCOUNTER (OUTPATIENT)
Facility: HOSPITAL | Age: 62
Setting detail: HOSPITAL OUTPATIENT SURGERY
Discharge: HOME | End: 2022-03-14
Attending: SURGERY | Admitting: SURGERY

## 2022-03-14 ENCOUNTER — ANESTHESIA EVENT (OUTPATIENT)
Dept: GASTROENTEROLOGY | Facility: HOSPITAL | Age: 62
End: 2022-03-14

## 2022-03-14 VITALS
SYSTOLIC BLOOD PRESSURE: 180 MMHG | RESPIRATION RATE: 18 BRPM | WEIGHT: 164 LBS | OXYGEN SATURATION: 96 % | HEIGHT: 62 IN | BODY MASS INDEX: 30.18 KG/M2 | HEART RATE: 68 BPM | DIASTOLIC BLOOD PRESSURE: 105 MMHG

## 2022-03-14 PROCEDURE — 45378 DIAGNOSTIC COLONOSCOPY: CPT | Performed by: SURGERY

## 2022-03-14 PROCEDURE — 25010000002 GLUCAGON (RDNA) PER 1 MG: Performed by: SURGERY

## 2022-03-14 PROCEDURE — 25010000002 PROPOFOL 10 MG/ML EMULSION: Performed by: ANESTHESIOLOGY

## 2022-03-14 RX ORDER — SODIUM CHLORIDE, SODIUM LACTATE, POTASSIUM CHLORIDE, CALCIUM CHLORIDE 600; 310; 30; 20 MG/100ML; MG/100ML; MG/100ML; MG/100ML
1000 INJECTION, SOLUTION INTRAVENOUS CONTINUOUS
Status: DISCONTINUED | OUTPATIENT
Start: 2022-03-14 | End: 2022-03-14 | Stop reason: HOSPADM

## 2022-03-14 RX ORDER — PROPOFOL 10 MG/ML
VIAL (ML) INTRAVENOUS CONTINUOUS PRN
Status: DISCONTINUED | OUTPATIENT
Start: 2022-03-14 | End: 2022-03-14 | Stop reason: SURG

## 2022-03-14 RX ORDER — LIDOCAINE HYDROCHLORIDE 20 MG/ML
INJECTION, SOLUTION INFILTRATION; PERINEURAL AS NEEDED
Status: DISCONTINUED | OUTPATIENT
Start: 2022-03-14 | End: 2022-03-14 | Stop reason: SURG

## 2022-03-14 RX ORDER — LIDOCAINE HYDROCHLORIDE 10 MG/ML
0.5 INJECTION, SOLUTION INFILTRATION; PERINEURAL ONCE AS NEEDED
Status: DISCONTINUED | OUTPATIENT
Start: 2022-03-14 | End: 2022-03-14 | Stop reason: HOSPADM

## 2022-03-14 RX ORDER — SODIUM CHLORIDE 0.9 % (FLUSH) 0.9 %
10 SYRINGE (ML) INJECTION AS NEEDED
Status: DISCONTINUED | OUTPATIENT
Start: 2022-03-14 | End: 2022-03-14 | Stop reason: HOSPADM

## 2022-03-14 RX ADMIN — PROPOFOL 200 MCG/KG/MIN: 10 INJECTION, EMULSION INTRAVENOUS at 09:23

## 2022-03-14 RX ADMIN — SODIUM CHLORIDE, POTASSIUM CHLORIDE, SODIUM LACTATE AND CALCIUM CHLORIDE 1000 ML: 600; 310; 30; 20 INJECTION, SOLUTION INTRAVENOUS at 09:05

## 2022-03-14 RX ADMIN — LIDOCAINE HYDROCHLORIDE 60 MG: 20 INJECTION, SOLUTION INFILTRATION; PERINEURAL at 09:23

## 2022-03-14 NOTE — OP NOTE
Colonoscopy Procedure Note  Marguerite Abarca  1960  Date of Procedure: 03/14/22    Pre-operative Diagnosis:    · Screening  · Father with colon cancer.     Post-operative Diagnosis:  · Tight turn in sigmoid, requiring switch to pediatric scope.  · Sigmoid diverticulosis    Procedure: Colonoscopy         Recommendations:   · Colonoscopy in 5 years.  · Review diverticulosis info given today.   · Keep a copy of the photographs of the procedure given to you today for possible need for reference in the future.      Surgeon: Ruth    Anesthetic: MAC per Leonard Cloud MD    Scope Withdrawal Time:  6 minutes  32 seconds    Procedure Details     MAC anesthesia was induced.  The 180 Colonoscopy was inserted blindly into the rectum and advanced to the cecum, with difficulty at the rectosigmoid junction and then thru the sigmoid, finally requiring that i switch to a pediatric scope.  This was easily advanced to the cecum, without need for pressure, lift, or turning.    Cecum was identified by the appendiceal orifice and the ileocecal valve and photographed for documentation.      Prep quality was excellent.  A careful inspection was made as the scope was withdrawn, including a retroflexed view of the rectum; there was no suggestion of presence of angiodysplasias, colitis, or polyps but there were diverticula, with no interventions.     Retroflexion in the rectum revealed no abnormalities.      Kay Miranda MD  03/14/22

## 2022-03-14 NOTE — ANESTHESIA POSTPROCEDURE EVALUATION
"Patient: Marguerite Abarca    Procedure Summary     Date: 03/14/22 Room / Location: [facility] ENDOSCOPY 4 / [facility] ENDOSCOPY    Anesthesia Start: 0921 Anesthesia Stop: 0946    Procedure: COLONOSCOPY TO CECUM (N/A ) Diagnosis:       Screen for colon cancer      Family history of colon cancer      (Screen for colon cancer [Z12.11])      (Family history of colon cancer [Z80.0])    Surgeons: Kay Miranda MD Provider: Leonard Cloud MD    Anesthesia Type: MAC ASA Status: 2          Anesthesia Type: MAC    Vitals  Vitals Value Taken Time   /105 03/14/22 1012   Temp     Pulse 68 03/14/22 1012   Resp 18 03/14/22 1012   SpO2 96 % 03/14/22 1012           Post Anesthesia Care and Evaluation    Patient location during evaluation: PACU  Patient participation: complete - patient participated  Level of consciousness: awake  Pain score: 0  Pain management: adequate  Airway patency: patent  Anesthetic complications: No anesthetic complications  PONV Status: none  Cardiovascular status: acceptable  Respiratory status: acceptable  Hydration status: acceptable    Comments: BP (!) 180/105 (BP Location: Left arm, Patient Position: Lying)   Pulse 68   Resp 18   Ht 157.5 cm (62\")   Wt 74.4 kg (164 lb)   LMP  (LMP Unknown)   SpO2 96%   BMI 30.00 kg/m²       "

## 2022-03-14 NOTE — H&P
Cc: Endoscopy Visit    HPI: 61 y.o. female here for screening with father with colon cancer.  She has no prior history of polyps.     Past Medical History:   Diagnosis Date   • Disease of thyroid gland    • GERD (gastroesophageal reflux disease)    • Herpes genitalis 2018    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 Lesion in the right interlabial sulcus. Min symptoms, resolved in couple days.  No lymphadenopathy.  Probable recurrence of old infection, discussed IgG and IgM testing to determine if recent infection.   • Osteoporosis    • Rheumatoid arthritis (HCC)     Dr Chava Espinal; Methotrexate,  Decreased relafen 12/1/2017.   • Vitamin D deficiency        Past Surgical History:   Procedure Laterality Date   • ANTERIOR AND POSTERIOR VAGINAL REPAIR N/A 8/7/2018    Procedure: POSTERIOR VAGINAL REPAIR MID URETHRAL SLING CYSTOSCOPY;  Surgeon: David Pelayo MD;  Location: [facility] OR;  Service: Gynecology   • CHOLECYSTECTOMY WITH INTRAOPERATIVE CHOLANGIOGRAM N/A 2/16/2018    Procedure: CHOLECYSTECTOMY LAPAROSCOPIC ;  Surgeon: Silvano Rushing MD;  Location: [facility] OR [facility];  Service:    • COLONOSCOPY N/A 8/25/2016    Diverticulosis in the sigmoid colon, The examination was otherwise normal on direct and retoflexion views   • COLONOSCOPY N/A 05/07/2010    Diverticulosis sigmoid colon, otherwise normal-Dr. Arnold Mcneal   • COLONOSCOPY N/A 05/04/2007    One 7 mm polyp in the distal sigmoid colon-Dr. Arnold Mcneal   • CYST REMOVAL Bilateral     Both wrists, benign.  Sounds like ganglion cyst.   • DIAGNOSTIC LAPAROSCOPY  1983    Exploratory Laparoscopy with myomectomy (twice) and cautery of endometriosis. The ovaries were preserved.   • ENDOSCOPY N/A 12/30/2017    Procedure: ESOPHAGOGASTRODUODENOSCOPY with biopsy; no H. pylori on biopsy.  Surgeon: Ignacio Rodriguez MD;  Location: [facility] ENDOSCOPY;  Service:    • EXCISION TUMOR NECK / THORAX  1987     Removal of Tumor from her back that was Benign, Lumbo-Sacral area.    • HEMORRHOIDECTOMY N/A  2010    w/ fissurectomy-Dr. Arnold Mcneal   • MYOMECTOMY      Exploratory Laparoscopy with myomectomy (twice) and cautery of endometriosis. The ovaries were preserved.   • SACROSPINUS SUSPENSION N/A 2020    Procedure: SACROSPINUS LIGAMENT SUSPENSION REPAIR CYSTOSCOPY;  Surgeon: David Pelayo MD;  Location: [facility] OR;  Service: Gynecology;  Laterality: N/A;   • SINUS SURGERY     • TOTAL ABDOMINAL HYSTERECTOMY WITH SALPINGO OOPHORECTOMY Bilateral     Total Abdominal Hysterectomy with Removal of Both Tubes and Ovaries. for chronic pelvic pain and adhesions.   • VOCAL CORD BIOPSY Right 2003    Microlaryngoscopy, biopsy of right true cord-Dr. Vamshi Barron       is allergic to nsaids.       Medication List      ASK your doctor about these medications    azithromycin 250 MG tablet  Commonly known as: Zithromax Z-Mert  Take 2 tablets by mouth on day 1, then 1 tablet daily on days 2-5     estradiol 0.0375 MG/24HR patch  Commonly known as: VIVELLE-DOT  Place 1 patch on the skin as directed by provider 2 (Two) Times a Week.     folic acid 1 MG tablet  Commonly known as: FOLVITE     levothyroxine 50 MCG tablet  Commonly known as: SYNTHROID, LEVOTHROID  Take 1 tablet by mouth Daily.     methotrexate 2.5 MG tablet     nabumetone 750 MG tablet  Commonly known as: RELAFEN     pantoprazole 40 MG EC tablet  Commonly known as: PROTONIX  Take 1 tablet by mouth 2 (Two) Times a Day Before Meals.     valACYclovir 500 MG tablet  Commonly known as: VALTREX     vitamin D 1.25 MG ([id number] UT) capsule capsule  Commonly known as: ERGOCALCIFEROL            Family History   Problem Relation Age of Onset   • Valvular heart disease Father          of CHF.   • Colon cancer Father    • Colon polyps Sister    • COPD Mother    • No Known Problems Brother    • No Known Problems Maternal Grandmother    • No Known Problems Maternal Grandfather    • No Known Problems Paternal Grandmother    • No Known Problems Paternal  Grandfather    • No Known Problems Maternal Aunt         x3   • No Known Problems Paternal Aunt         x3   • Kidney disease Maternal Uncle    • BRCA 1/2 Neg Hx    • Breast cancer Neg Hx    • Endometrial cancer Neg Hx    • Ovarian cancer Neg Hx    • Malig Hyperthermia Neg Hx    • Uterine cancer Neg Hx        Social History     Socioeconomic History   • Marital status:      Spouse name: LETITIA   • Number of children: 0   Tobacco Use   • Smoking status: Never Smoker   • Smokeless tobacco: Never Used   Vaping Use   • Vaping Use: Never used   Substance and Sexual Activity   • Alcohol use: Not Currently     Comment: socially   • Drug use: No   • Sexual activity: Yes     Partners: Male     Birth control/protection: Surgical     Comment: spouse = LETITIA        Vitals:    03/14/22 0855   BP: 155/84   Pulse: 75   Resp: 16   SpO2: 99%       Body mass index is 30 kg/m².    Physical Exam    General: No acute distress  Lungs: No labored breathing, Pulse oximetry on room air is 99%.  Heart/EKG: RRR  Abdomen: no complaints of pain  Mental:  Awake, alert, and oriented    Imp:     · Screening  · Father with colon cancer.      Plan:  · C scope    Kay Miranda MD  09:21 EDT

## 2022-03-14 NOTE — ANESTHESIA PREPROCEDURE EVALUATION
Anesthesia Evaluation     Patient summary reviewed and Nursing notes reviewed                Airway   Mallampati: I  TM distance: >3 FB  Neck ROM: full  No difficulty expected  Dental - normal exam     Pulmonary - negative pulmonary ROS and normal exam   Cardiovascular - negative cardio ROS and normal exam        Neuro/Psych  (+) numbness,    GI/Hepatic/Renal/Endo    (+)  GERD,  thyroid problem hypothyroidism    Musculoskeletal     Abdominal  - normal exam    Bowel sounds: normal.   Substance History - negative use     OB/GYN negative ob/gyn ROS         Other   arthritis,                      Anesthesia Plan    ASA 2     MAC       Anesthetic plan, all risks, benefits, and alternatives have been provided, discussed and informed consent has been obtained with: patient.        CODE STATUS:

## 2022-04-05 ENCOUNTER — TELEPHONE (OUTPATIENT)
Dept: FAMILY MEDICINE CLINIC | Age: 62
End: 2022-04-05

## 2022-04-05 NOTE — TELEPHONE ENCOUNTER
Caller: Marguerite Abarca    Relationship: Self    Best call back number: [phone]    What medication are you requesting: Z-PACK    What are your current symptoms: A LOT OF DRAINAGE    How long have you been experiencing symptoms: 2 DAYS    Have you had these symptoms before:    [x] Yes  [] No    Have you been treated for these symptoms before:   [x] Yes  [] No    If a prescription is needed, what is your preferred pharmacy and phone number: [facility] [place], [place] - [address]  - [phone]  - [phone]     Additional notes:

## 2022-04-06 DIAGNOSIS — J32.9 SINUSITIS, UNSPECIFIED CHRONICITY, UNSPECIFIED LOCATION: Primary | ICD-10-CM

## 2022-04-06 RX ORDER — AZITHROMYCIN 250 MG/1
TABLET, FILM COATED ORAL
Qty: 6 TABLET | Refills: 0 | Status: SHIPPED | OUTPATIENT
Start: 2022-04-06 | End: 2022-05-06

## 2022-04-06 NOTE — TELEPHONE ENCOUNTER
I sent rx in but would recommend that she try taking antihistamine and decongestant for a few days prior to taking

## 2022-05-06 ENCOUNTER — OFFICE VISIT (OUTPATIENT)
Dept: FAMILY MEDICINE CLINIC | Age: 62
End: 2022-05-06

## 2022-05-06 VITALS
WEIGHT: 162 LBS | HEIGHT: 62 IN | HEART RATE: 76 BPM | BODY MASS INDEX: 29.81 KG/M2 | TEMPERATURE: 98.9 F | DIASTOLIC BLOOD PRESSURE: 84 MMHG | SYSTOLIC BLOOD PRESSURE: 149 MMHG

## 2022-05-06 DIAGNOSIS — R05.9 COUGH: ICD-10-CM

## 2022-05-06 DIAGNOSIS — H69.83 EUSTACHIAN TUBE DYSFUNCTION, BILATERAL: Primary | ICD-10-CM

## 2022-05-06 DIAGNOSIS — J30.1 SEASONAL ALLERGIC RHINITIS DUE TO POLLEN: ICD-10-CM

## 2022-05-06 DIAGNOSIS — R03.0 ELEVATED BLOOD PRESSURE READING: ICD-10-CM

## 2022-05-06 PROBLEM — H35.3130 BILATERAL NONEXUDATIVE AGE-RELATED MACULAR DEGENERATION: Status: ACTIVE | Noted: 2021-01-26

## 2022-05-06 PROBLEM — H25.13 AGE-RELATED NUCLEAR CATARACT OF BOTH EYES: Status: ACTIVE | Noted: 2021-01-26

## 2022-05-06 PROBLEM — H40.1130 PRIMARY OPEN ANGLE GLAUCOMA (POAG) OF BOTH EYES: Status: ACTIVE | Noted: 2021-01-26

## 2022-05-06 PROCEDURE — 99213 OFFICE O/P EST LOW 20 MIN: CPT | Performed by: NURSE PRACTITIONER

## 2022-05-06 RX ORDER — PANTOPRAZOLE SODIUM 40 MG/1
1 TABLET, DELAYED RELEASE ORAL DAILY
COMMUNITY
End: 2022-05-06

## 2022-05-06 RX ORDER — METHOTREXATE 2.5 MG/ML
SOLUTION ORAL
COMMUNITY
End: 2022-05-06

## 2022-05-06 RX ORDER — CHLORCYCLIZINE HYDROCHLORIDE AND PSEUDOEPHEDRINE HYDROCHLORIDE 25; 60 MG/1; MG/1
1 TABLET ORAL EVERY 8 HOURS PRN
Qty: 42 TABLET | Refills: 0 | Status: SHIPPED | OUTPATIENT
Start: 2022-05-06 | End: 2022-08-12 | Stop reason: SDUPTHER

## 2022-05-06 RX ORDER — LEVOCETIRIZINE DIHYDROCHLORIDE 5 MG/1
5 TABLET, FILM COATED ORAL EVERY EVENING
Qty: 90 TABLET | Refills: 3 | Status: SHIPPED | OUTPATIENT
Start: 2022-05-06 | End: 2023-02-20

## 2022-05-06 RX ORDER — ESTRADIOL 0.04 MG/D
1 PATCH TRANSDERMAL
COMMUNITY
End: 2022-05-06

## 2022-05-06 RX ORDER — BENZONATATE 100 MG/1
100 CAPSULE ORAL 3 TIMES DAILY PRN
Qty: 40 CAPSULE | Refills: 0 | Status: SHIPPED | OUTPATIENT
Start: 2022-05-06 | End: 2023-02-20

## 2022-05-06 RX ORDER — FLUTICASONE PROPIONATE 50 MCG
2 SPRAY, SUSPENSION (ML) NASAL DAILY
Qty: 48 G | Refills: 3 | Status: SHIPPED | OUTPATIENT
Start: 2022-05-06

## 2022-05-06 RX ORDER — METHYLPREDNISOLONE 4 MG/1
TABLET ORAL
Qty: 21 TABLET | Refills: 0 | Status: SHIPPED | OUTPATIENT
Start: 2022-05-06 | End: 2022-08-12

## 2022-05-06 NOTE — PROGRESS NOTES
Chief Complaint  Marguerite Abarca presents to [facility] for Cough (Cough, congestion, runny nose, X3 weeks)    Subjective          History of Present Illness    Marguerite is here today with c/o 3 week history of cough, congestion, runny nose. She has taken Claritin D and OTC cold and flu (dextromethorpan, guaifenesin, phenylephine) and Delsym cough syrup. Has taken Zpack as well. Using saline solution. She has had allergy testing in the past. Did allergy shots but not helpful so told to stop.     Review of Systems      Allergies   Allergen Reactions   • Nsaids Unknown - Low Severity     States cannot take nsaids due to home med interaction      Past Medical History:   Diagnosis Date   • Disease of thyroid gland    • GERD (gastroesophageal reflux disease)    • Herpes genitalis 2018    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 Lesion in the right interlabial sulcus. Min symptoms, resolved in couple days.  No lymphadenopathy.  Probable recurrence of old infection, discussed IgG and IgM testing to determine if recent infection.   • Osteoporosis    • Rheumatoid arthritis (HCC)     Dr Chava Espinal; Methotrexate,  Decreased relafen 12/1/2017.   • Vitamin D deficiency      Current Outpatient Medications   Medication Sig Dispense Refill   • estradiol (VIVELLE-DOT) 0.0375 MG/24HR patch Place 1 patch on the skin as directed by provider 2 (Two) Times a Week. 48 patch 1   • folic acid (FOLVITE) 1 MG tablet Take 1 mg by mouth daily.     • levothyroxine (SYNTHROID, LEVOTHROID) 50 MCG tablet Take 1 tablet by mouth Daily. 90 tablet 1   • methotrexate 2.5 MG tablet Take 15 mg by mouth 1 (One) Time Per Week. Thursday TAKES 2.5 MG 6 TABS     • nabumetone (RELAFEN) 750 MG tablet Take 750 mg by mouth 2 (Two) Times a Day. HOLD PRIOR TO SURGERY     • pantoprazole (PROTONIX) 40 MG EC tablet Take 1 tablet by mouth 2 (Two) Times a Day Before Meals. (Patient taking differently: Take 40 mg by mouth Every Night.) 60 tablet 2   •  valACYclovir (VALTREX) 500 MG tablet Take 500 mg by mouth 2 (Two) Times a Day As Needed.     • vitamin D (ERGOCALCIFEROL) 17813 UNITS capsule capsule Take 50,000 Units by mouth 1 (One) Time Per Week. THURSDAY     • benzonatate (Tessalon Perles) 100 MG capsule Take 1 capsule by mouth 3 (Three) Times a Day As Needed for Cough. 40 capsule 0   • Chlorcyclizine-Pseudoephed (Stahist AD) 25-60 MG tablet Take 1 tablet by mouth Every 8 (Eight) Hours As Needed (congestion). 42 tablet 0   • fluticasone (Flonase) 50 MCG/ACT nasal spray 2 sprays into the nostril(s) as directed by provider Daily. 48 g 3   • levocetirizine (Xyzal) 5 MG tablet Take 1 tablet by mouth Every Evening. 90 tablet 3   • methylPREDNISolone (Medrol) 4 MG tablet Take as directed for 5 days 21 tablet 0     No current facility-administered medications for this visit.     Past Surgical History:   Procedure Laterality Date   • ANTERIOR AND POSTERIOR VAGINAL REPAIR N/A 8/7/2018    Procedure: POSTERIOR VAGINAL REPAIR MID URETHRAL SLING CYSTOSCOPY;  Surgeon: David Pelayo MD;  Location: [facility] MAIN OR;  Service: Gynecology   • CHOLECYSTECTOMY WITH INTRAOPERATIVE CHOLANGIOGRAM N/A 2/16/2018    Procedure: CHOLECYSTECTOMY LAPAROSCOPIC ;  Surgeon: Silvano Rushing MD;  Location: [facility] OR OSC;  Service:    • COLONOSCOPY N/A 8/25/2016    Diverticulosis in the sigmoid colon, The examination was otherwise normal on direct and retoflexion views   • COLONOSCOPY N/A 05/07/2010    Diverticulosis sigmoid colon, otherwise normal-Dr. Arnold Mcneal   • COLONOSCOPY N/A 05/04/2007    One 7 mm polyp in the distal sigmoid colon-Dr. Arnold Mcneal   • COLONOSCOPY N/A 3/14/2022    Procedure: COLONOSCOPY TO CECUM;  Surgeon: Kay Miranda MD;  Location: [facility] ENDOSCOPY;  Service: General;  Laterality: N/A;  PRE- FAMILY HX COLON CANCER  POST- DIVERTICULOSIS   • CYST REMOVAL Bilateral     Both wrists, benign.  Sounds like ganglion cyst.   • DIAGNOSTIC LAPAROSCOPY  1983     Exploratory Laparoscopy with myomectomy (twice) and cautery of endometriosis. The ovaries were preserved.   • ENDOSCOPY N/A 2017    Procedure: ESOPHAGOGASTRODUODENOSCOPY with biopsy; no H. pylori on biopsy.  Surgeon: Ignacio Rodriguez MD;  Location: [facility] ENDOSCOPY;  Service:    • EXCISION TUMOR NECK / THORAX       Removal of Tumor from her back that was Benign, Lumbo-Sacral area.    • HEMORRHOIDECTOMY N/A 2010    w/ fissurectomy-Dr. Arnold Mcneal   • MYOMECTOMY      Exploratory Laparoscopy with myomectomy (twice) and cautery of endometriosis. The ovaries were preserved.   • SACROSPINUS SUSPENSION N/A 2020    Procedure: SACROSPINUS LIGAMENT SUSPENSION REPAIR CYSTOSCOPY;  Surgeon: David Pelayo MD;  Location: [facility] MAIN OR;  Service: Gynecology;  Laterality: N/A;   • SINUS SURGERY     • TOTAL ABDOMINAL HYSTERECTOMY WITH SALPINGO OOPHORECTOMY Bilateral     Total Abdominal Hysterectomy with Removal of Both Tubes and Ovaries. for chronic pelvic pain and adhesions.   • VOCAL CORD BIOPSY Right 2003    Microlaryngoscopy, biopsy of right true cord-Dr. Vamshi Barron      Social History     Tobacco Use   • Smoking status: Never Smoker   • Smokeless tobacco: Never Used   Vaping Use   • Vaping Use: Never used   Substance Use Topics   • Alcohol use: Not Currently     Comment: socially   • Drug use: No     Family History   Problem Relation Age of Onset   • Valvular heart disease Father          of CHF.   • Colon cancer Father    • Colon polyps Sister    • COPD Mother    • No Known Problems Brother    • No Known Problems Maternal Grandmother    • No Known Problems Maternal Grandfather    • No Known Problems Paternal Grandmother    • No Known Problems Paternal Grandfather    • No Known Problems Maternal Aunt         x3   • No Known Problems Paternal Aunt         x3   • Kidney disease Maternal Uncle    • BRCA 1/2 Neg Hx    • Breast cancer Neg Hx    • Endometrial cancer Neg Hx    • Ovarian  "cancer Neg Hx    • Malig Hyperthermia Neg Hx    • Uterine cancer Neg Hx      Health Maintenance Due   Topic Date Due   • DXA SCAN  Never done   • ZOSTER VACCINE (1 of 2) Never done      Immunization History   Administered Date(s) Administered   • FluLaval/Fluarix/Fluzone >6 01/04/2022   • Flucelvax Quad Vial =>4yrs 10/01/2020   • Pneumococcal Polysaccharide (PPSV23) 02/24/2021   • flucelvax quad pfs =>4 YRS 10/01/2020        Objective     Vitals:    05/06/22 1312 05/06/22 1317   BP: 153/93 149/84   BP Location: Right arm Right arm   Patient Position: Sitting Sitting   Pulse: 82 76   Temp: 98.9 °F (37.2 °C)    TempSrc: Oral    Weight: 73.5 kg (162 lb)    Height: 157.5 cm (62\")      Body mass index is 29.63 kg/m².     Physical Exam  Vitals reviewed.   Constitutional:       General: She is not in acute distress.     Appearance: Normal appearance. She is well-developed.   HENT:      Head: Normocephalic and atraumatic.      Right Ear: Ear canal normal. A middle ear effusion is present.      Left Ear: Ear canal normal. A middle ear effusion is present.      Nose: Congestion present.      Mouth/Throat:      Mouth: Mucous membranes are moist.   Eyes:      Extraocular Movements: Extraocular movements intact.      Pupils: Pupils are equal, round, and reactive to light.   Cardiovascular:      Rate and Rhythm: Normal rate and regular rhythm.   Pulmonary:      Effort: Pulmonary effort is normal.      Breath sounds: Normal breath sounds.   Neurological:      Mental Status: She is alert and oriented to person, place, and time.   Psychiatric:         Mood and Affect: Mood and affect normal.           Result Review :                               Assessment and Plan      Diagnoses and all orders for this visit:    1. Eustachian tube dysfunction, bilateral (Primary)  -     Chlorcyclizine-Pseudoephed (Stahist AD) 25-60 MG tablet; Take 1 tablet by mouth Every 8 (Eight) Hours As Needed (congestion).  Dispense: 42 tablet; Refill: 0  -    "  methylPREDNISolone (Medrol) 4 MG tablet; Take as directed for 5 days  Dispense: 21 tablet; Refill: 0    2. Seasonal allergic rhinitis due to pollen  -     levocetirizine (Xyzal) 5 MG tablet; Take 1 tablet by mouth Every Evening.  Dispense: 90 tablet; Refill: 3  -     fluticasone (Flonase) 50 MCG/ACT nasal spray; 2 sprays into the nostril(s) as directed by provider Daily.  Dispense: 48 g; Refill: 3    3. Cough  -     benzonatate (Tessalon Perles) 100 MG capsule; Take 1 capsule by mouth 3 (Three) Times a Day As Needed for Cough.  Dispense: 40 capsule; Refill: 0    4. Elevated blood pressure reading  Comments:  Likely due to recent medications. Will continue to monitor.              Follow Up     Return for As needed for persistent or worsening symptoms, Chronic visit follow up.

## 2022-05-19 ENCOUNTER — TELEPHONE (OUTPATIENT)
Dept: FAMILY MEDICINE CLINIC | Age: 62
End: 2022-05-19

## 2022-05-19 NOTE — TELEPHONE ENCOUNTER
Caller: Marguerite Abarca    Relationship to patient: Self    Best call back number: [phone]    Patient is needing: PATIENT WOULD LIE TO HAVE A CALL BACK AND SAID THAT SHE IS STILL HAVING COUGH AND SINUS SYMPTOMS ANS GREEN PHLEGM. PATIENT WOULD LIKE TO HAVE A CALL BACK WITH NEXT BEST STEPS AND SAID HER RIGHT EAR IS STOPPED UP AS WELL.      RICARDO VENEGAS [facility] - [place], [place] - [address]  - [phone]  - [phone]   [phone]

## 2022-08-12 ENCOUNTER — OFFICE VISIT (OUTPATIENT)
Dept: FAMILY MEDICINE CLINIC | Age: 62
End: 2022-08-12

## 2022-08-12 VITALS
TEMPERATURE: 98.9 F | BODY MASS INDEX: 30 KG/M2 | WEIGHT: 163 LBS | HEART RATE: 108 BPM | DIASTOLIC BLOOD PRESSURE: 77 MMHG | SYSTOLIC BLOOD PRESSURE: 110 MMHG | HEIGHT: 62 IN

## 2022-08-12 DIAGNOSIS — U07.1 COVID: Primary | ICD-10-CM

## 2022-08-12 DIAGNOSIS — R39.15 URGENCY OF URINATION: ICD-10-CM

## 2022-08-12 LAB
BILIRUB BLD-MCNC: ABNORMAL MG/DL
CLARITY, POC: CLEAR
COLOR UR: ABNORMAL
EXPIRATION DATE: ABNORMAL
EXPIRATION DATE: ABNORMAL
FLUAV AG UPPER RESP QL IA.RAPID: NOT DETECTED
FLUBV AG UPPER RESP QL IA.RAPID: NOT DETECTED
GLUCOSE UR STRIP-MCNC: NEGATIVE MG/DL
INTERNAL CONTROL: ABNORMAL
KETONES UR QL: ABNORMAL
LEUKOCYTE EST, POC: NEGATIVE
Lab: ABNORMAL
Lab: ABNORMAL
NITRITE UR-MCNC: NEGATIVE MG/ML
PH UR: 6 [PH] (ref 5–8)
PROT UR STRIP-MCNC: ABNORMAL MG/DL
RBC # UR STRIP: NEGATIVE /UL
SARS-COV-2 AG UPPER RESP QL IA.RAPID: DETECTED
SP GR UR: 1.02 (ref 1–1.03)
UROBILINOGEN UR QL: NORMAL

## 2022-08-12 PROCEDURE — 87428 SARSCOV & INF VIR A&B AG IA: CPT | Performed by: NURSE PRACTITIONER

## 2022-08-12 PROCEDURE — 81003 URINALYSIS AUTO W/O SCOPE: CPT | Performed by: NURSE PRACTITIONER

## 2022-08-12 PROCEDURE — 99214 OFFICE O/P EST MOD 30 MIN: CPT | Performed by: NURSE PRACTITIONER

## 2022-08-12 PROCEDURE — 87086 URINE CULTURE/COLONY COUNT: CPT | Performed by: NURSE PRACTITIONER

## 2022-08-12 RX ORDER — PREDNISONE 10 MG/1
TABLET ORAL
Qty: 35 TABLET | Refills: 0 | Status: SHIPPED | OUTPATIENT
Start: 2022-08-12 | End: 2022-08-27

## 2022-08-12 RX ORDER — CHLORCYCLIZINE HYDROCHLORIDE AND PSEUDOEPHEDRINE HYDROCHLORIDE 25; 60 MG/1; MG/1
1 TABLET ORAL EVERY 8 HOURS PRN
Qty: 42 TABLET | Refills: 0 | Status: SHIPPED | OUTPATIENT
Start: 2022-08-12 | End: 2022-11-07

## 2022-08-12 RX ORDER — ONDANSETRON 4 MG/1
4 TABLET, FILM COATED ORAL EVERY 8 HOURS PRN
Qty: 30 TABLET | Refills: 0 | Status: SHIPPED | OUTPATIENT
Start: 2022-08-12 | End: 2022-08-19

## 2022-08-12 NOTE — PROGRESS NOTES
Chief Complaint  Marguerite Abarca presents to [facility] for Fatigue (Wants to sleep a lot, pain coming around the stomach, legs sting and hurt when touched X 3 weeks)    Subjective          History of Present Illness    Marguerite is here today with c/o symptoms that started about 2 1/2 to 3 weeks ago. Notes cough, nausea, stomach upset, urinary urgency, body aches. Denies fever. Took at home covid test yesterday that was negative.       Review of Systems      Allergies   Allergen Reactions   • Nsaids Unknown - Low Severity     States cannot take nsaids due to home med interaction      Past Medical History:   Diagnosis Date   • Disease of thyroid gland    • GERD (gastroesophageal reflux disease)    • Herpes genitalis 2018    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 Lesion in the right interlabial sulcus. Min symptoms, resolved in couple days.  No lymphadenopathy.  Probable recurrence of old infection, discussed IgG and IgM testing to determine if recent infection.   • Osteoporosis    • Rheumatoid arthritis (HCC)     Dr Chava Espinal; Methotrexate,  Decreased relafen 12/1/2017.   • Vitamin D deficiency      Current Outpatient Medications   Medication Sig Dispense Refill   • benzonatate (Tessalon Perles) 100 MG capsule Take 1 capsule by mouth 3 (Three) Times a Day As Needed for Cough. 40 capsule 0   • Chlorcyclizine-Pseudoephed (Stahist AD) 25-60 MG tablet Take 1 tablet by mouth Every 8 (Eight) Hours As Needed (congestion). 42 tablet 0   • estradiol (VIVELLE-DOT) 0.0375 MG/24HR patch Place 1 patch on the skin as directed by provider 2 (Two) Times a Week. (Patient taking differently: Place 1 patch on the skin as directed by provider 1 (One) Time Per Week.) 48 patch 1   • fluticasone (Flonase) 50 MCG/ACT nasal spray 2 sprays into the nostril(s) as directed by provider Daily. 48 g 3   • folic acid (FOLVITE) 1 MG tablet Take 1 mg by mouth daily.     • levocetirizine (Xyzal) 5 MG tablet Take 1 tablet by mouth  Every Evening. 90 tablet 3   • levothyroxine (SYNTHROID, LEVOTHROID) 50 MCG tablet Take 1 tablet by mouth Daily. 90 tablet 1   • methotrexate 2.5 MG tablet Take 15 mg by mouth 1 (One) Time Per Week. Thursday TAKES 2.5 MG 6 TABS     • nabumetone (RELAFEN) 750 MG tablet Take 750 mg by mouth 2 (Two) Times a Day. HOLD PRIOR TO SURGERY     • pantoprazole (PROTONIX) 40 MG EC tablet Take 1 tablet by mouth 2 (Two) Times a Day Before Meals. (Patient taking differently: Take 40 mg by mouth Daily.) 60 tablet 2   • valACYclovir (VALTREX) 500 MG tablet Take 500 mg by mouth 2 (Two) Times a Day As Needed.     • vitamin D (ERGOCALCIFEROL) 21498 UNITS capsule capsule Take 50,000 Units by mouth 1 (One) Time Per Week. THURSDAY     • ondansetron (Zofran) 4 MG tablet Take 1 tablet by mouth Every 8 (Eight) Hours As Needed for Nausea or Vomiting. 30 tablet 0   • predniSONE (DELTASONE) 10 MG tablet Take 2 tablets by mouth 2 (Two) Times a Day for 5 days, THEN 1 tablet 2 (Two) Times a Day for 5 days, THEN 1 tablet Daily for 5 days. 35 tablet 0     No current facility-administered medications for this visit.     Past Surgical History:   Procedure Laterality Date   • ANTERIOR AND POSTERIOR VAGINAL REPAIR N/A 8/7/2018    Procedure: POSTERIOR VAGINAL REPAIR MID URETHRAL SLING CYSTOSCOPY;  Surgeon: David Pelayo MD;  Location: [facility];  Service: Gynecology   • CHOLECYSTECTOMY WITH INTRAOPERATIVE CHOLANGIOGRAM N/A 2/16/2018    Procedure: CHOLECYSTECTOMY LAPAROSCOPIC ;  Surgeon: Silvano Rushing MD;  Location: [facility];  Service:    • COLONOSCOPY N/A 8/25/2016    Diverticulosis in the sigmoid colon, The examination was otherwise normal on direct and retoflexion views   • COLONOSCOPY N/A 05/07/2010    Diverticulosis sigmoid colon, otherwise normal-Dr. Arnold Mcneal   • COLONOSCOPY N/A 05/04/2007    One 7 mm polyp in the distal sigmoid colon-Dr. Arnold Mcneal   • COLONOSCOPY N/A 3/14/2022    Procedure: COLONOSCOPY TO CECUM;   Surgeon: Kay Miranda MD;  Location: [facility] ENDOSCOPY;  Service: General;  Laterality: N/A;  PRE- FAMILY HX COLON CANCER  POST- DIVERTICULOSIS   • CYST REMOVAL Bilateral     Both wrists, benign.  Sounds like ganglion cyst.   • DIAGNOSTIC LAPAROSCOPY      Exploratory Laparoscopy with myomectomy (twice) and cautery of endometriosis. The ovaries were preserved.   • ENDOSCOPY N/A 2017    Procedure: ESOPHAGOGASTRODUODENOSCOPY with biopsy; no H. pylori on biopsy.  Surgeon: Ignacio Rodriguez MD;  Location: [facility] ENDOSCOPY;  Service:    • EXCISION TUMOR NECK / THORAX       Removal of Tumor from her back that was Benign, Lumbo-Sacral area.    • HEMORRHOIDECTOMY N/A 2010    w/ fissurectomy-Dr. Arnold Mcneal   • MYOMECTOMY      Exploratory Laparoscopy with myomectomy (twice) and cautery of endometriosis. The ovaries were preserved.   • SACROSPINUS SUSPENSION N/A 2020    Procedure: SACROSPINUS LIGAMENT SUSPENSION REPAIR CYSTOSCOPY;  Surgeon: David Pelayo MD;  Location: [facility] MAIN OR;  Service: Gynecology;  Laterality: N/A;   • SINUS SURGERY     • TOTAL ABDOMINAL HYSTERECTOMY WITH SALPINGO OOPHORECTOMY Bilateral     Total Abdominal Hysterectomy with Removal of Both Tubes and Ovaries. for chronic pelvic pain and adhesions.   • VOCAL CORD BIOPSY Right 2003    Microlaryngoscopy, biopsy of right true cord-Dr. Vamshi Barron      Social History     Tobacco Use   • Smoking status: Never Smoker   • Smokeless tobacco: Never Used   Vaping Use   • Vaping Use: Never used   Substance Use Topics   • Alcohol use: Not Currently     Comment: socially   • Drug use: No     Family History   Problem Relation Age of Onset   • Valvular heart disease Father          of CHF.   • Colon cancer Father    • Colon polyps Sister    • COPD Mother    • No Known Problems Brother    • No Known Problems Maternal Grandmother    • No Known Problems Maternal Grandfather    • No Known Problems Paternal Grandmother   "  • No Known Problems Paternal Grandfather    • No Known Problems Maternal Aunt         x3   • No Known Problems Paternal Aunt         x3   • Kidney disease Maternal Uncle    • BRCA 1/2 Neg Hx    • Breast cancer Neg Hx    • Endometrial cancer Neg Hx    • Ovarian cancer Neg Hx    • Malig Hyperthermia Neg Hx    • Uterine cancer Neg Hx      Health Maintenance Due   Topic Date Due   • DXA SCAN  Never done   • COVID-19 Vaccine (1) Never done   • ZOSTER VACCINE (1 of 2) Never done      Immunization History   Administered Date(s) Administered   • FluLaval/Fluarix/Fluzone >6 01/04/2022   • Flucelvax Quad Vial =>4yrs 10/01/2020   • Pneumococcal Polysaccharide (PPSV23) 02/24/2021        Objective     Vitals:    08/12/22 1059   BP: 110/77   BP Location: Left arm   Patient Position: Sitting   Pulse: 108   Temp: 98.9 °F (37.2 °C)   TempSrc: Oral   Weight: 73.9 kg (163 lb)   Height: 157.5 cm (62\")     Body mass index is 29.81 kg/m².     Physical Exam  Vitals reviewed.   Constitutional:       General: She is not in acute distress.     Appearance: Normal appearance. She is well-developed.   HENT:      Head: Normocephalic and atraumatic.      Right Ear: Tympanic membrane and ear canal normal.      Left Ear: Tympanic membrane and ear canal normal.      Mouth/Throat:      Mouth: Mucous membranes are moist.   Eyes:      Extraocular Movements: Extraocular movements intact.      Pupils: Pupils are equal, round, and reactive to light.   Cardiovascular:      Rate and Rhythm: Normal rate and regular rhythm.   Pulmonary:      Effort: Pulmonary effort is normal.      Breath sounds: Normal breath sounds.   Abdominal:      General: Bowel sounds are normal.      Palpations: Abdomen is soft.      Tenderness: There is no abdominal tenderness.   Musculoskeletal:         General: Normal range of motion.   Neurological:      Mental Status: She is alert and oriented to person, place, and time.   Psychiatric:         Mood and Affect: Mood and affect " normal.           Result Review :     The following data was reviewed by: JACY Nazario on 08/12/2022:          POCT SARS-CoV-2 Antigen GUTIERREZ + Flu (08/12/2022 11:44)  POCT urinalysis dipstick, automated (08/12/2022 11:39)                  Assessment and Plan      Diagnoses and all orders for this visit:    1. COVID (Primary)  -     POCT SARS-CoV-2 Antigen GUTIERREZ + Flu  -     Chlorcyclizine-Pseudoephed (Stahist AD) 25-60 MG tablet; Take 1 tablet by mouth Every 8 (Eight) Hours As Needed (congestion).  Dispense: 42 tablet; Refill: 0  -     ondansetron (Zofran) 4 MG tablet; Take 1 tablet by mouth Every 8 (Eight) Hours As Needed for Nausea or Vomiting.  Dispense: 30 tablet; Refill: 0  -     predniSONE (DELTASONE) 10 MG tablet; Take 2 tablets by mouth 2 (Two) Times a Day for 5 days, THEN 1 tablet 2 (Two) Times a Day for 5 days, THEN 1 tablet Daily for 5 days.  Dispense: 35 tablet; Refill: 0    2. Urgency of urination  -     POCT urinalysis dipstick, automated  -     Urine Culture - Urine, Urine, Clean Catch; Future  -     Urine Culture - Urine, Urine, Clean Catch      Covid test positive. Symptomatic tx discussed. Additionally, advised increased water intake. Will await urine culture as may need treatment for UTI as well. F/U persistent or worsening concerns. ER precautions given as well.         Follow Up     Return for As needed for persistent or worsening symptoms.

## 2022-08-14 LAB — BACTERIA SPEC AEROBE CULT: NORMAL

## 2022-08-19 ENCOUNTER — OFFICE VISIT (OUTPATIENT)
Dept: FAMILY MEDICINE CLINIC | Age: 62
End: 2022-08-19

## 2022-08-19 ENCOUNTER — LAB (OUTPATIENT)
Dept: LAB | Facility: HOSPITAL | Age: 62
End: 2022-08-19

## 2022-08-19 VITALS
HEART RATE: 88 BPM | DIASTOLIC BLOOD PRESSURE: 76 MMHG | BODY MASS INDEX: 30.73 KG/M2 | WEIGHT: 167 LBS | HEIGHT: 62 IN | TEMPERATURE: 98.3 F | SYSTOLIC BLOOD PRESSURE: 125 MMHG

## 2022-08-19 DIAGNOSIS — R39.15 URGENCY OF URINATION: ICD-10-CM

## 2022-08-19 DIAGNOSIS — R60.0 LOWER EXTREMITY EDEMA: Primary | ICD-10-CM

## 2022-08-19 DIAGNOSIS — R60.0 LOWER EXTREMITY EDEMA: ICD-10-CM

## 2022-08-19 LAB
ALBUMIN SERPL-MCNC: 3.5 G/DL (ref 3.5–5.2)
ALBUMIN/GLOB SERPL: 1.3 G/DL
ALP SERPL-CCNC: 85 U/L (ref 39–117)
ALT SERPL W P-5'-P-CCNC: 21 U/L (ref 1–33)
ANION GAP SERPL CALCULATED.3IONS-SCNC: 9.8 MMOL/L (ref 5–15)
AST SERPL-CCNC: 45 U/L (ref 1–32)
BACTERIA UR QL AUTO: ABNORMAL /HPF
BASOPHILS # BLD AUTO: 0.03 10*3/MM3 (ref 0–0.2)
BASOPHILS NFR BLD AUTO: 1 % (ref 0–1.5)
BILIRUB SERPL-MCNC: 0.3 MG/DL (ref 0–1.2)
BILIRUB UR QL STRIP: NEGATIVE
BUN SERPL-MCNC: 11 MG/DL (ref 8–23)
BUN/CREAT SERPL: 12.8 (ref 7–25)
CALCIUM SPEC-SCNC: 8 MG/DL (ref 8.6–10.5)
CHLORIDE SERPL-SCNC: 108 MMOL/L (ref 98–107)
CLARITY UR: ABNORMAL
CO2 SERPL-SCNC: 25.2 MMOL/L (ref 22–29)
COLOR UR: YELLOW
CREAT SERPL-MCNC: 0.86 MG/DL (ref 0.57–1)
DEPRECATED RDW RBC AUTO: 54.3 FL (ref 37–54)
EGFRCR SERPLBLD CKD-EPI 2021: 76 ML/MIN/1.73
EOSINOPHIL # BLD AUTO: 0.03 10*3/MM3 (ref 0–0.4)
EOSINOPHIL NFR BLD AUTO: 1 % (ref 0.3–6.2)
ERYTHROCYTE [DISTWIDTH] IN BLOOD BY AUTOMATED COUNT: 15.9 % (ref 12.3–15.4)
GLOBULIN UR ELPH-MCNC: 2.6 GM/DL
GLUCOSE SERPL-MCNC: 91 MG/DL (ref 65–99)
GLUCOSE UR STRIP-MCNC: NEGATIVE MG/DL
HCT VFR BLD AUTO: 30.7 % (ref 34–46.6)
HGB BLD-MCNC: 9.8 G/DL (ref 12–15.9)
HGB UR QL STRIP.AUTO: NEGATIVE
IMM GRANULOCYTES # BLD AUTO: 0.01 10*3/MM3 (ref 0–0.05)
IMM GRANULOCYTES NFR BLD AUTO: 0.3 % (ref 0–0.5)
KETONES UR QL STRIP: NEGATIVE
LEUKOCYTE ESTERASE UR QL STRIP.AUTO: NEGATIVE
LYMPHOCYTES # BLD AUTO: 1.34 10*3/MM3 (ref 0.7–3.1)
LYMPHOCYTES NFR BLD AUTO: 46.5 % (ref 19.6–45.3)
MCH RBC QN AUTO: 30 PG (ref 26.6–33)
MCHC RBC AUTO-ENTMCNC: 31.9 G/DL (ref 31.5–35.7)
MCV RBC AUTO: 93.9 FL (ref 79–97)
MONOCYTES # BLD AUTO: 0.2 10*3/MM3 (ref 0.1–0.9)
MONOCYTES NFR BLD AUTO: 6.9 % (ref 5–12)
MUCOUS THREADS URNS QL MICRO: ABNORMAL /HPF
NEUTROPHILS NFR BLD AUTO: 1.27 10*3/MM3 (ref 1.7–7)
NEUTROPHILS NFR BLD AUTO: 44.3 % (ref 42.7–76)
NITRITE UR QL STRIP: NEGATIVE
NRBC BLD AUTO-RTO: 0 /100 WBC (ref 0–0.2)
NT-PROBNP SERPL-MCNC: 444 PG/ML (ref 0–900)
PH UR STRIP.AUTO: 6 [PH] (ref 5–8)
PLATELET # BLD AUTO: 180 10*3/MM3 (ref 140–450)
PMV BLD AUTO: 9.6 FL (ref 6–12)
POTASSIUM SERPL-SCNC: 3.9 MMOL/L (ref 3.5–5.2)
PROT SERPL-MCNC: 6.1 G/DL (ref 6–8.5)
PROT UR QL STRIP: ABNORMAL
RBC # BLD AUTO: 3.27 10*6/MM3 (ref 3.77–5.28)
RBC # UR STRIP: ABNORMAL /HPF
REF LAB TEST METHOD: ABNORMAL
SODIUM SERPL-SCNC: 143 MMOL/L (ref 136–145)
SP GR UR STRIP: >=1.03 (ref 1–1.03)
SQUAMOUS #/AREA URNS HPF: ABNORMAL /HPF
TSH SERPL DL<=0.05 MIU/L-ACNC: 5.53 UIU/ML (ref 0.27–4.2)
UROBILINOGEN UR QL STRIP: ABNORMAL
WBC # UR STRIP: ABNORMAL /HPF
WBC NRBC COR # BLD: 2.88 10*3/MM3 (ref 3.4–10.8)

## 2022-08-19 PROCEDURE — 80050 GENERAL HEALTH PANEL: CPT

## 2022-08-19 PROCEDURE — 84466 ASSAY OF TRANSFERRIN: CPT | Performed by: NURSE PRACTITIONER

## 2022-08-19 PROCEDURE — 83880 ASSAY OF NATRIURETIC PEPTIDE: CPT

## 2022-08-19 PROCEDURE — 87086 URINE CULTURE/COLONY COUNT: CPT

## 2022-08-19 PROCEDURE — 81001 URINALYSIS AUTO W/SCOPE: CPT

## 2022-08-19 PROCEDURE — 99214 OFFICE O/P EST MOD 30 MIN: CPT | Performed by: NURSE PRACTITIONER

## 2022-08-19 PROCEDURE — 36415 COLL VENOUS BLD VENIPUNCTURE: CPT

## 2022-08-19 PROCEDURE — 83540 ASSAY OF IRON: CPT | Performed by: NURSE PRACTITIONER

## 2022-08-19 RX ORDER — HYDROCHLOROTHIAZIDE 12.5 MG/1
12.5 TABLET ORAL DAILY
Qty: 5 TABLET | Refills: 0 | Status: SHIPPED | OUTPATIENT
Start: 2022-08-19 | End: 2022-08-30 | Stop reason: SDUPTHER

## 2022-08-19 NOTE — PROGRESS NOTES
Chief Complaint  Marguerite Abarca presents to [facility] for Foot Swelling (Selling in both feet, been taking water pill, X 1 week )    Subjective          History of Present Illness    Marguerite is here today with c/o bilateral lower extremity swelling. This started last week. She feels that she is not urinating as much as she should with how much water she is drinking. She notes feeling tight in both of her thighs and her abdomen as well. She had recent urine culture with mixed gregory so recollection suggested if still having symptoms. Took OTC Diurex recently without much improvement.     Review of Systems      Allergies   Allergen Reactions   • Nsaids Unknown - Low Severity     States cannot take nsaids due to home med interaction      Past Medical History:   Diagnosis Date   • Disease of thyroid gland    • GERD (gastroesophageal reflux disease)    • Herpes genitalis 2018    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 Lesion in the right interlabial sulcus. Min symptoms, resolved in couple days.  No lymphadenopathy.  Probable recurrence of old infection, discussed IgG and IgM testing to determine if recent infection.   • Osteoporosis    • Rheumatoid arthritis (HCC)     Dr Chava Espinal; Methotrexate,  Decreased relafen 12/1/2017.   • Vitamin D deficiency      Current Outpatient Medications   Medication Sig Dispense Refill   • benzonatate (Tessalon Perles) 100 MG capsule Take 1 capsule by mouth 3 (Three) Times a Day As Needed for Cough. 40 capsule 0   • Chlorcyclizine-Pseudoephed (Stahist AD) 25-60 MG tablet Take 1 tablet by mouth Every 8 (Eight) Hours As Needed (congestion). 42 tablet 0   • estradiol (VIVELLE-DOT) 0.0375 MG/24HR patch Place 1 patch on the skin as directed by provider 2 (Two) Times a Week. (Patient taking differently: Place 1 patch on the skin as directed by provider 1 (One) Time Per Week.) 48 patch 1   • fluticasone (Flonase) 50 MCG/ACT nasal spray 2 sprays into the nostril(s) as directed  by provider Daily. 48 g 3   • folic acid (FOLVITE) 1 MG tablet Take 1 mg by mouth daily.     • levocetirizine (Xyzal) 5 MG tablet Take 1 tablet by mouth Every Evening. 90 tablet 3   • levothyroxine (SYNTHROID, LEVOTHROID) 50 MCG tablet Take 1 tablet by mouth Daily. 90 tablet 1   • methotrexate 2.5 MG tablet Take 15 mg by mouth 1 (One) Time Per Week. Thursday TAKES 2.5 MG 6 TABS     • nabumetone (RELAFEN) 750 MG tablet Take 750 mg by mouth 2 (Two) Times a Day. HOLD PRIOR TO SURGERY     • pantoprazole (PROTONIX) 40 MG EC tablet Take 1 tablet by mouth 2 (Two) Times a Day Before Meals. (Patient taking differently: Take 40 mg by mouth Daily.) 60 tablet 2   • predniSONE (DELTASONE) 10 MG tablet Take 2 tablets by mouth 2 (Two) Times a Day for 5 days, THEN 1 tablet 2 (Two) Times a Day for 5 days, THEN 1 tablet Daily for 5 days. 35 tablet 0   • valACYclovir (VALTREX) 500 MG tablet Take 500 mg by mouth 2 (Two) Times a Day As Needed.     • vitamin D (ERGOCALCIFEROL) 29231 UNITS capsule capsule Take 50,000 Units by mouth 1 (One) Time Per Week. THURSDAY     • hydroCHLOROthiazide (HYDRODIURIL) 12.5 MG tablet Take 1 tablet by mouth Daily. 5 tablet 0     No current facility-administered medications for this visit.     Past Surgical History:   Procedure Laterality Date   • ANTERIOR AND POSTERIOR VAGINAL REPAIR N/A 8/7/2018    Procedure: POSTERIOR VAGINAL REPAIR MID URETHRAL SLING CYSTOSCOPY;  Surgeon: David Pelayo MD;  Location: [facility] OR;  Service: Gynecology   • CHOLECYSTECTOMY WITH INTRAOPERATIVE CHOLANGIOGRAM N/A 2/16/2018    Procedure: CHOLECYSTECTOMY LAPAROSCOPIC ;  Surgeon: Silvano Rushing MD;  [place] [facility] [place] [facility];  Service:    • COLONOSCOPY N/A 8/25/2016    Diverticulosis in the sigmoid colon, The examination was otherwise normal on direct and retoflexion views   • COLONOSCOPY N/A 05/07/2010    Diverticulosis sigmoid colon, otherwise normal-Dr. Arnold Mcneal   • COLONOSCOPY N/A 05/04/2007    One 7 mm polyp in  the distal sigmoid colon-Dr. Arnold Mcneal   • COLONOSCOPY N/A 3/14/2022    Procedure: COLONOSCOPY TO CECUM;  Surgeon: Kay Miranda MD;  Location: [facility] ENDOSCOPY;  Service: General;  Laterality: N/A;  PRE- FAMILY HX COLON CANCER  POST- DIVERTICULOSIS   • CYST REMOVAL Bilateral     Both wrists, benign.  Sounds like ganglion cyst.   • DIAGNOSTIC LAPAROSCOPY      Exploratory Laparoscopy with myomectomy (twice) and cautery of endometriosis. The ovaries were preserved.   • ENDOSCOPY N/A 2017    Procedure: ESOPHAGOGASTRODUODENOSCOPY with biopsy; no H. pylori on biopsy.  Surgeon: Ignacio Rodriguez MD;  Location: [facility] ENDOSCOPY;  Service:    • EXCISION TUMOR NECK / THORAX       Removal of Tumor from her back that was Benign, Lumbo-Sacral area.    • HEMORRHOIDECTOMY N/A 2010    w/ fissurectomy-Dr. Arnold Mcneal   • MYOMECTOMY      Exploratory Laparoscopy with myomectomy (twice) and cautery of endometriosis. The ovaries were preserved.   • SACROSPINUS SUSPENSION N/A 2020    Procedure: SACROSPINUS LIGAMENT SUSPENSION REPAIR CYSTOSCOPY;  Surgeon: David Pelayo MD;  Location: [facility] MAIN OR;  Service: Gynecology;  Laterality: N/A;   • SINUS SURGERY     • TOTAL ABDOMINAL HYSTERECTOMY WITH SALPINGO OOPHORECTOMY Bilateral     Total Abdominal Hysterectomy with Removal of Both Tubes and Ovaries. for chronic pelvic pain and adhesions.   • VOCAL CORD BIOPSY Right 2003    Microlaryngoscopy, biopsy of right true cord-Dr. Vamshi Barron      Social History     Tobacco Use   • Smoking status: Never Smoker   • Smokeless tobacco: Never Used   Vaping Use   • Vaping Use: Never used   Substance Use Topics   • Alcohol use: Not Currently     Comment: socially   • Drug use: No     Family History   Problem Relation Age of Onset   • Valvular heart disease Father          of CHF.   • Colon cancer Father    • Colon polyps Sister    • COPD Mother    • No Known Problems Brother    • No Known Problems  "Maternal Grandmother    • No Known Problems Maternal Grandfather    • No Known Problems Paternal Grandmother    • No Known Problems Paternal Grandfather    • No Known Problems Maternal Aunt         x3   • No Known Problems Paternal Aunt         x3   • Kidney disease Maternal Uncle    • BRCA 1/2 Neg Hx    • Breast cancer Neg Hx    • Endometrial cancer Neg Hx    • Ovarian cancer Neg Hx    • Malig Hyperthermia Neg Hx    • Uterine cancer Neg Hx      Health Maintenance Due   Topic Date Due   • DXA SCAN  Never done   • COVID-19 Vaccine (1) Never done   • ZOSTER VACCINE (1 of 2) Never done      Immunization History   Administered Date(s) Administered   • FluLaval/Fluzone >6mos 01/04/2022   • Flucelvax Quad Vial =>4yrs 10/01/2020   • Pneumococcal Polysaccharide (PPSV23) 02/24/2021        Objective     Vitals:    08/19/22 1423 08/19/22 1427   BP: 143/60 125/76   BP Location: Left arm Right arm   Patient Position: Sitting Sitting   Pulse: 90 88   Temp: 98.3 °F (36.8 °C)    TempSrc: Oral    Weight: 75.8 kg (167 lb)    Height: 157.5 cm (62\")      Body mass index is 30.54 kg/m².     Physical Exam  Vitals reviewed.   Constitutional:       General: She is not in acute distress.     Appearance: Normal appearance. She is well-developed.   HENT:      Head: Normocephalic and atraumatic.   Cardiovascular:      Rate and Rhythm: Normal rate and regular rhythm.      Comments: Bilateral trace edema, +varicose veins  Pulmonary:      Effort: Pulmonary effort is normal.      Breath sounds: Normal breath sounds.   Abdominal:      General: Bowel sounds are normal.      Palpations: Abdomen is soft.   Neurological:      Mental Status: She is alert and oriented to person, place, and time.   Psychiatric:         Mood and Affect: Mood and affect normal.           Result Review :     The following data was reviewed by: JACY Nazario on 08/19/2022:          Comprehensive metabolic panel (03/03/2022 10:30)  Lipid panel (03/03/2022 10:30)  TSH " (03/03/2022 10:30)  Urine Culture - Urine, Urine, Clean Catch (08/12/2022 11:40)  POCT urinalysis dipstick, automated (08/12/2022 11:39)                  Assessment and Plan      Diagnoses and all orders for this visit:    1. Lower extremity edema (Primary)  Comments:  Compression stockings. Elevate LEs. Low sodium diet. Checking labs. Consider US/Echo as indicated.   Orders:  -     CBC & Differential; Future  -     Comprehensive Metabolic Panel; Future  -     TSH; Future  -     proBNP; Future  -     hydroCHLOROthiazide (HYDRODIURIL) 12.5 MG tablet; Take 1 tablet by mouth Daily.  Dispense: 5 tablet; Refill: 0    2. Urgency of urination  Comments:  Repeating urine labs  Orders:  -     Urinalysis With Microscopic - Urine, Clean Catch; Future  -     Urine Culture - Urine, Urine, Clean Catch; Future              Follow Up     Return for after testing.

## 2022-08-20 LAB — BACTERIA SPEC AEROBE CULT: NO GROWTH

## 2022-08-22 DIAGNOSIS — E07.9 DISEASE OF THYROID GLAND: ICD-10-CM

## 2022-08-22 DIAGNOSIS — D64.9 ANEMIA, UNSPECIFIED TYPE: Primary | ICD-10-CM

## 2022-08-22 LAB
IRON 24H UR-MRATE: 52 MCG/DL (ref 37–145)
IRON SATN MFR SERPL: 17 % (ref 20–50)
TIBC SERPL-MCNC: 311 MCG/DL (ref 298–536)
TRANSFERRIN SERPL-MCNC: 209 MG/DL (ref 200–360)

## 2022-08-23 DIAGNOSIS — D64.9 ANEMIA, UNSPECIFIED TYPE: Primary | ICD-10-CM

## 2022-08-23 RX ORDER — LEVOTHYROXINE SODIUM 0.05 MG/1
TABLET ORAL
Qty: 90 TABLET | Refills: 1 | Status: SHIPPED | OUTPATIENT
Start: 2022-08-23 | End: 2023-02-20 | Stop reason: SDUPTHER

## 2022-08-30 ENCOUNTER — LAB (OUTPATIENT)
Dept: LAB | Facility: HOSPITAL | Age: 62
End: 2022-08-30

## 2022-08-30 DIAGNOSIS — D64.9 ANEMIA, UNSPECIFIED TYPE: ICD-10-CM

## 2022-08-30 DIAGNOSIS — R60.0 LOWER EXTREMITY EDEMA: ICD-10-CM

## 2022-08-30 LAB
ALBUMIN SERPL-MCNC: 4.1 G/DL (ref 3.5–5.2)
ALBUMIN/GLOB SERPL: 1.6 G/DL
ALP SERPL-CCNC: 83 U/L (ref 39–117)
ALT SERPL W P-5'-P-CCNC: 18 U/L (ref 1–33)
ANION GAP SERPL CALCULATED.3IONS-SCNC: 10 MMOL/L (ref 5–15)
AST SERPL-CCNC: 22 U/L (ref 1–32)
BASOPHILS # BLD AUTO: 0.03 10*3/MM3 (ref 0–0.2)
BASOPHILS NFR BLD AUTO: 0.9 % (ref 0–1.5)
BILIRUB SERPL-MCNC: 0.5 MG/DL (ref 0–1.2)
BUN SERPL-MCNC: 9 MG/DL (ref 8–23)
BUN/CREAT SERPL: 10.1 (ref 7–25)
CALCIUM SPEC-SCNC: 9.3 MG/DL (ref 8.6–10.5)
CHLORIDE SERPL-SCNC: 107 MMOL/L (ref 98–107)
CO2 SERPL-SCNC: 25 MMOL/L (ref 22–29)
CREAT SERPL-MCNC: 0.89 MG/DL (ref 0.57–1)
DEPRECATED RDW RBC AUTO: 56.5 FL (ref 37–54)
EGFRCR SERPLBLD CKD-EPI 2021: 73 ML/MIN/1.73
EOSINOPHIL # BLD AUTO: 0.17 10*3/MM3 (ref 0–0.4)
EOSINOPHIL NFR BLD AUTO: 4.9 % (ref 0.3–6.2)
ERYTHROCYTE [DISTWIDTH] IN BLOOD BY AUTOMATED COUNT: 15.8 % (ref 12.3–15.4)
GLOBULIN UR ELPH-MCNC: 2.6 GM/DL
GLUCOSE SERPL-MCNC: 89 MG/DL (ref 65–99)
HCT VFR BLD AUTO: 34 % (ref 34–46.6)
HGB BLD-MCNC: 10.3 G/DL (ref 12–15.9)
IMM GRANULOCYTES # BLD AUTO: 0.01 10*3/MM3 (ref 0–0.05)
IMM GRANULOCYTES NFR BLD AUTO: 0.3 % (ref 0–0.5)
LYMPHOCYTES # BLD AUTO: 1 10*3/MM3 (ref 0.7–3.1)
LYMPHOCYTES NFR BLD AUTO: 28.7 % (ref 19.6–45.3)
MCH RBC QN AUTO: 29.8 PG (ref 26.6–33)
MCHC RBC AUTO-ENTMCNC: 30.3 G/DL (ref 31.5–35.7)
MCV RBC AUTO: 98.3 FL (ref 79–97)
MONOCYTES # BLD AUTO: 0.4 10*3/MM3 (ref 0.1–0.9)
MONOCYTES NFR BLD AUTO: 11.5 % (ref 5–12)
NEUTROPHILS NFR BLD AUTO: 1.88 10*3/MM3 (ref 1.7–7)
NEUTROPHILS NFR BLD AUTO: 53.7 % (ref 42.7–76)
PLATELET # BLD AUTO: 204 10*3/MM3 (ref 140–450)
PMV BLD AUTO: 8.9 FL (ref 6–12)
POTASSIUM SERPL-SCNC: 4.5 MMOL/L (ref 3.5–5.2)
PROT SERPL-MCNC: 6.7 G/DL (ref 6–8.5)
RBC # BLD AUTO: 3.46 10*6/MM3 (ref 3.77–5.28)
SODIUM SERPL-SCNC: 142 MMOL/L (ref 136–145)
WBC NRBC COR # BLD: 3.49 10*3/MM3 (ref 3.4–10.8)

## 2022-08-30 PROCEDURE — 36415 COLL VENOUS BLD VENIPUNCTURE: CPT

## 2022-08-30 PROCEDURE — 86901 BLOOD TYPING SEROLOGIC RH(D): CPT | Performed by: NURSE PRACTITIONER

## 2022-08-30 PROCEDURE — 85025 COMPLETE CBC W/AUTO DIFF WBC: CPT

## 2022-08-30 PROCEDURE — 86900 BLOOD TYPING SEROLOGIC ABO: CPT | Performed by: NURSE PRACTITIONER

## 2022-08-30 PROCEDURE — 80053 COMPREHEN METABOLIC PANEL: CPT

## 2022-08-30 NOTE — TELEPHONE ENCOUNTER
Pt states edema is better, still has some. Is going on a ride and has issues with swelling every time, so she would like a refill. She would also like a lab order placed so she can see what blood type she is. Please advise.

## 2022-08-31 DIAGNOSIS — D64.9 ANEMIA, UNSPECIFIED TYPE: ICD-10-CM

## 2022-08-31 DIAGNOSIS — D64.9 ANEMIA, UNSPECIFIED TYPE: Primary | ICD-10-CM

## 2022-08-31 DIAGNOSIS — Z01.83 BLOOD TYPING ENCOUNTER: Primary | ICD-10-CM

## 2022-08-31 LAB
ABO GROUP BLD: NORMAL
RH BLD: POSITIVE

## 2022-08-31 RX ORDER — HYDROCHLOROTHIAZIDE 12.5 MG/1
12.5 TABLET ORAL DAILY
Qty: 5 TABLET | Refills: 0 | Status: SHIPPED | OUTPATIENT
Start: 2022-08-31 | End: 2022-09-07

## 2022-09-06 DIAGNOSIS — R60.0 LOWER EXTREMITY EDEMA: ICD-10-CM

## 2022-09-07 RX ORDER — HYDROCHLOROTHIAZIDE 12.5 MG/1
TABLET ORAL
Qty: 5 TABLET | Refills: 0 | Status: SHIPPED | OUTPATIENT
Start: 2022-09-07 | End: 2022-12-13

## 2022-11-07 DIAGNOSIS — U07.1 COVID: ICD-10-CM

## 2022-11-07 RX ORDER — CHLORCYCLIZINE HYDROCHLORIDE AND PSEUDOEPHEDRINE HYDROCHLORIDE 25; 60 MG/1; MG/1
TABLET ORAL
Qty: 30 TABLET | Refills: 1 | Status: SHIPPED | OUTPATIENT
Start: 2022-11-07 | End: 2023-02-20

## 2022-12-13 DIAGNOSIS — R60.0 LOWER EXTREMITY EDEMA: ICD-10-CM

## 2022-12-13 RX ORDER — HYDROCHLOROTHIAZIDE 12.5 MG/1
TABLET ORAL
Qty: 5 TABLET | Refills: 0 | Status: SHIPPED | OUTPATIENT
Start: 2022-12-13 | End: 2023-02-02 | Stop reason: SDUPTHER

## 2023-02-02 DIAGNOSIS — R60.0 LOWER EXTREMITY EDEMA: ICD-10-CM

## 2023-02-02 NOTE — TELEPHONE ENCOUNTER
Pt wondering if she can get 30 day supply of HCTZ instead of 5 at a time because it is the same price.

## 2023-02-03 RX ORDER — HYDROCHLOROTHIAZIDE 12.5 MG/1
12.5 TABLET ORAL DAILY
Qty: 30 TABLET | Refills: 0 | Status: SHIPPED | OUTPATIENT
Start: 2023-02-03 | End: 2023-02-20 | Stop reason: SDUPTHER

## 2023-02-13 ENCOUNTER — TELEPHONE (OUTPATIENT)
Dept: FAMILY MEDICINE CLINIC | Age: 63
End: 2023-02-13

## 2023-02-13 NOTE — TELEPHONE ENCOUNTER
Pt is wondering if she can wait until September to come in when she will be on Medicare, has appointment scheduled 2/20/23, but would like to wait, is currently self pay, pt is needing a refill on levothyroxine, last labs were done in August, and TSH was abnormal, please advise, does pt need to come in for appointment and labs or ok to wait until September?

## 2023-02-13 NOTE — TELEPHONE ENCOUNTER
Caller: Marguerite Abarca    Relationship: Self    Best call back number:[phone]    What is the best time to reach you: ANY    Who are you requesting to speak with (clinical staff, provider,  specific staff member): TOSHIA    What was the call regarding: PATIENT IS SELF PAY AND WOULD LIKE A CALL BACK TO DISCUSS WHAT WILL BE DONE AT HER APPOINTMENT AND PRICES. PLEASE CALL AND ADVISE.     Do you require a callback: YES

## 2023-02-20 ENCOUNTER — OFFICE VISIT (OUTPATIENT)
Dept: FAMILY MEDICINE CLINIC | Age: 63
End: 2023-02-20

## 2023-02-20 VITALS
HEIGHT: 62 IN | WEIGHT: 161.2 LBS | SYSTOLIC BLOOD PRESSURE: 138 MMHG | DIASTOLIC BLOOD PRESSURE: 70 MMHG | BODY MASS INDEX: 29.66 KG/M2

## 2023-02-20 DIAGNOSIS — Z00.00 ANNUAL PHYSICAL EXAM: Primary | ICD-10-CM

## 2023-02-20 DIAGNOSIS — R60.0 LOWER EXTREMITY EDEMA: ICD-10-CM

## 2023-02-20 DIAGNOSIS — E07.9 DISEASE OF THYROID GLAND: ICD-10-CM

## 2023-02-20 PROCEDURE — 99396 PREV VISIT EST AGE 40-64: CPT | Performed by: NURSE PRACTITIONER

## 2023-02-20 RX ORDER — HYDROCHLOROTHIAZIDE 12.5 MG/1
12.5 TABLET ORAL DAILY PRN
Qty: 30 TABLET | Refills: 0 | Status: SHIPPED | OUTPATIENT
Start: 2023-02-20

## 2023-02-20 RX ORDER — LEVOTHYROXINE SODIUM 0.05 MG/1
50 TABLET ORAL DAILY
Qty: 90 TABLET | Refills: 2 | Status: SHIPPED | OUTPATIENT
Start: 2023-02-20

## 2023-02-20 NOTE — PROGRESS NOTES
Chief Complaint  Marguerite Abarca presents to [facility] for Annual Exam    Subjective          History of Present Illness    Marguerite is here today for annual preventive exam.  Declines covid, Tdap, zoster, influenza vaccines.  Had normal screening mammogram on 2/25/22 ordered by GYN. She has repeat scheduled.   Pap smear no longer indicated due to history of hysterectomy.  3/14/22 was her last colonoscop with Dr Miranda.  Never smoker.    She is on levothyroxine 50 mcg daily for thyroid disease. Last TSH 5.53.   She is also on HCTZ 12.5 mg daily PRN. This has been beneficial for LE swelling.  She wishes to wait until September for lab work as she does not have insurance currently.    Review of Systems   Constitutional: Negative for chills and fever.   HENT: Negative for ear pain and sore throat.    Eyes: Negative for blurred vision and redness.   Respiratory: Negative for shortness of breath and wheezing.    Cardiovascular: Negative for chest pain and palpitations.   Gastrointestinal: Negative for abdominal pain and vomiting.   Genitourinary: Negative for frequency and urgency.   Skin: Negative for rash.   Neurological: Negative for dizziness and seizures.   Psychiatric/Behavioral: Negative for suicidal ideas and depressed mood.         Allergies   Allergen Reactions   • Nsaids Unknown - Low Severity     States cannot take nsaids due to home med interaction      Past Medical History:   Diagnosis Date   • Disease of thyroid gland    • GERD (gastroesophageal reflux disease)    • Herpes genitalis 2018    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 Lesion in the right interlabial sulcus. Min symptoms, resolved in couple days.  No lymphadenopathy.  Probable recurrence of old infection, discussed IgG and IgM testing to determine if recent infection.   • Osteoporosis    • Rheumatoid arthritis (HCC)     Dr Chava Espinal; Methotrexate,  Decreased relafen 12/1/2017.   • Vitamin D deficiency      Current Outpatient  Medications   Medication Sig Dispense Refill   • estradiol (VIVELLE-DOT) 0.0375 MG/24HR patch Place 1 patch on the skin as directed by provider 2 (Two) Times a Week. (Patient taking differently: Place 1 patch on the skin as directed by provider 1 (One) Time Per Week.) 48 patch 1   • fluticasone (Flonase) 50 MCG/ACT nasal spray 2 sprays into the nostril(s) as directed by provider Daily. (Patient taking differently: 2 sprays into the nostril(s) as directed by provider As Needed.) 48 g 3   • folic acid (FOLVITE) 1 MG tablet Take 1 mg by mouth daily.     • hydroCHLOROthiazide (HYDRODIURIL) 12.5 MG tablet Take 1 tablet by mouth Daily As Needed (LE swelling). 30 tablet 0   • levothyroxine (SYNTHROID, LEVOTHROID) 50 MCG tablet Take 1 tablet by mouth Daily. 90 tablet 2   • methotrexate 2.5 MG tablet Take 15 mg by mouth 1 (One) Time Per Week. Thursday TAKES 2.5 MG 6 TABS     • nabumetone (RELAFEN) 750 MG tablet Take 750 mg by mouth 2 (Two) Times a Day. HOLD PRIOR TO SURGERY     • pantoprazole (PROTONIX) 40 MG EC tablet Take 1 tablet by mouth 2 (Two) Times a Day Before Meals. (Patient taking differently: Take 40 mg by mouth Daily.) 60 tablet 2   • valACYclovir (VALTREX) 500 MG tablet Take 500 mg by mouth 2 (Two) Times a Day As Needed.     • vitamin D (ERGOCALCIFEROL) 68956 UNITS capsule capsule Take 50,000 Units by mouth 1 (One) Time Per Week. THURSDAY       No current facility-administered medications for this visit.     Past Surgical History:   Procedure Laterality Date   • ANTERIOR AND POSTERIOR VAGINAL REPAIR N/A 8/7/2018    Procedure: POSTERIOR VAGINAL REPAIR MID URETHRAL SLING CYSTOSCOPY;  Surgeon: David Pelayo MD;  Location: [facility] OR;  Service: Gynecology   • CHOLECYSTECTOMY WITH INTRAOPERATIVE CHOLANGIOGRAM N/A 2/16/2018    Procedure: CHOLECYSTECTOMY LAPAROSCOPIC ;  Surgeon: Silvano Rushing MD;  Location: [facility];  Service:    • COLONOSCOPY N/A 8/25/2016    Diverticulosis in the sigmoid colon, The  examination was otherwise normal on direct and retoflexion views   • COLONOSCOPY N/A 05/07/2010    Diverticulosis sigmoid colon, otherwise normal-Dr. Arnold Mcneal   • COLONOSCOPY N/A 05/04/2007    One 7 mm polyp in the distal sigmoid colon-Dr. Arnold Mcneal   • COLONOSCOPY N/A 3/14/2022    Procedure: COLONOSCOPY TO CECUM;  Surgeon: Kay Miranda MD;  Location: [facility] ENDOSCOPY;  Service: General;  Laterality: N/A;  PRE- FAMILY HX COLON CANCER  POST- DIVERTICULOSIS   • CYST REMOVAL Bilateral     Both wrists, benign.  Sounds like ganglion cyst.   • DIAGNOSTIC LAPAROSCOPY  1983    Exploratory Laparoscopy with myomectomy (twice) and cautery of endometriosis. The ovaries were preserved.   • ENDOSCOPY N/A 12/30/2017    Procedure: ESOPHAGOGASTRODUODENOSCOPY with biopsy; no H. pylori on biopsy.  Surgeon: Ignacio Rodriguez MD;  Location: [facility] ENDOSCOPY;  Service:    • EXCISION TUMOR NECK / THORAX  1987     Removal of Tumor from her back that was Benign, Lumbo-Sacral area.    • HEMORRHOIDECTOMY N/A 05/18/2010    w/ fissurectomy-Dr. Arnold Mcneal   • MYOMECTOMY  1995    Exploratory Laparoscopy with myomectomy (twice) and cautery of endometriosis. The ovaries were preserved.   • SACROSPINUS SUSPENSION N/A 2/27/2020    Procedure: SACROSPINUS LIGAMENT SUSPENSION REPAIR CYSTOSCOPY;  Surgeon: David Pelayo MD;  Location: [facility] OR;  Service: Gynecology;  Laterality: N/A;   • SINUS SURGERY     • TOTAL ABDOMINAL HYSTERECTOMY WITH SALPINGO OOPHORECTOMY Bilateral 1999    Total Abdominal Hysterectomy with Removal of Both Tubes and Ovaries. for chronic pelvic pain and adhesions.   • VOCAL CORD BIOPSY Right 07/31/2003    Microlaryngoscopy, biopsy of right true cord-Dr. Vamshi Barron      Social History     Tobacco Use   • Smoking status: Never   • Smokeless tobacco: Never   Vaping Use   • Vaping Use: Never used   Substance Use Topics   • Alcohol use: Not Currently     Comment: socially   • Drug use: No     Family History  "  Problem Relation Age of Onset   • Valvular heart disease Father          of CHF.   • Colon cancer Father    • Colon polyps Sister    • COPD Mother    • No Known Problems Brother    • No Known Problems Maternal Grandmother    • No Known Problems Maternal Grandfather    • No Known Problems Paternal Grandmother    • No Known Problems Paternal Grandfather    • No Known Problems Maternal Aunt         x3   • No Known Problems Paternal Aunt         x3   • Kidney disease Maternal Uncle    • BRCA 1/2 Neg Hx    • Breast cancer Neg Hx    • Endometrial cancer Neg Hx    • Ovarian cancer Neg Hx    • Malig Hyperthermia Neg Hx    • Uterine cancer Neg Hx      Health Maintenance Due   Topic Date Due   • ANNUAL PHYSICAL  2023      Immunization History   Administered Date(s) Administered   • FluLaval/Fluzone >6mos 2022   • Flucelvax Quad Vial =>4yrs 10/01/2020   • Pneumococcal Polysaccharide (PPSV23) 2021        Objective     Vitals:    23 0806   BP: 138/70   BP Location: Left arm   Patient Position: Sitting   Weight: 73.1 kg (161 lb 3.2 oz)   Height: 157.5 cm (62.01\")     Body mass index is 29.48 kg/m².     Physical Exam  Vitals reviewed.   Constitutional:       General: She is not in acute distress.     Appearance: Normal appearance. She is well-developed.   HENT:      Head: Normocephalic and atraumatic.      Right Ear: Hearing, tympanic membrane and ear canal normal.      Left Ear: Hearing, tympanic membrane and ear canal normal.      Mouth/Throat:      Mouth: Mucous membranes are moist.   Eyes:      Extraocular Movements: Extraocular movements intact.      Pupils: Pupils are equal, round, and reactive to light.   Cardiovascular:      Rate and Rhythm: Normal rate and regular rhythm.      Pulses: Normal pulses.      Heart sounds: Normal heart sounds.   Pulmonary:      Effort: Pulmonary effort is normal.      Breath sounds: Normal breath sounds.   Abdominal:      General: Bowel sounds are normal.      " Palpations: Abdomen is soft.      Tenderness: There is no abdominal tenderness.   Musculoskeletal:         General: Normal range of motion.      Cervical back: Normal range of motion and neck supple.   Skin:     General: Skin is warm and dry.   Neurological:      Mental Status: She is alert and oriented to person, place, and time.   Psychiatric:         Mood and Affect: Mood normal.           Result Review :                               Assessment and Plan      Diagnoses and all orders for this visit:    1. Annual physical exam (Primary)  Comments:  Appropriate screenings and vaccinations were reviewed with the pt and offered as indicated.  Pt counseled on healthy lifestyle including healthy diet, exercise.    2. Disease of thyroid gland  Comments:  Continue current medication.  Orders:  -     levothyroxine (SYNTHROID, LEVOTHROID) 50 MCG tablet; Take 1 tablet by mouth Daily.  Dispense: 90 tablet; Refill: 2    3. Lower extremity edema  Comments:  Compression stockings. Elevate LEs. Low sodium diet. Will continue HCTZ PRN.   Orders:  -     hydroCHLOROthiazide (HYDRODIURIL) 12.5 MG tablet; Take 1 tablet by mouth Daily As Needed (LE swelling).  Dispense: 30 tablet; Refill: 0      Ideally, would like to have lab work performed today. She is without insurance currently and wishes to postpone labs until September. She will be seen sooner if any problems arise but she is feeling well today.           Follow Up     Return in about 6 months (around 8/20/2023) for Recheck.

## 2023-03-20 RX ORDER — ESTRADIOL 0.04 MG/D
FILM, EXTENDED RELEASE TRANSDERMAL
Qty: 48 PATCH | Refills: 1 | Status: SHIPPED | OUTPATIENT
Start: 2023-03-20

## 2023-05-17 ENCOUNTER — TELEPHONE (OUTPATIENT)
Dept: FAMILY MEDICINE CLINIC | Age: 63
End: 2023-05-17

## 2023-05-17 RX ORDER — LEVOCETIRIZINE DIHYDROCHLORIDE 5 MG/1
5 TABLET, FILM COATED ORAL DAILY
Qty: 90 TABLET | Refills: 3 | Status: SHIPPED | OUTPATIENT
Start: 2023-05-17

## 2023-05-17 NOTE — TELEPHONE ENCOUNTER
Caller: Rima Marguerite ADRIANA    Relationship: Self    Best call back number: [phone]    Requested Prescriptions:   Requested Prescriptions      No prescriptions requested or ordered in this encounter    LEVOCETIRIZINE 5MG    Pharmacy where request should be sent: [facility] [id number] - [address] - [phone]  - [phone] FX     Last office visit with prescribing clinician: 2/20/2023   Last telemedicine visit with prescribing clinician: 2/20/2023   Next office visit with prescribing clinician: 9/7/2023     Additional details provided by patient: PATIENT HAS THREE DAY SUPPLY OF MEDICATION. SHE IS CHANGING PHARMACY AND IS OUT OF REFILLS FOR THIS MEDICATION. PLEASE SEND NEW PRESCRIPTION WITH 90 DAY REFILLS EACH TIME TO PHARMACY ASAP.    Does the patient have less than a 3 day supply:  [] Yes  [x] No    Would you like a call back once the refill request has been completed: [] Yes [] No    If the office needs to give you a call back, can they leave a voicemail: [] Yes [] No    Jacob Tirado Rep   05/17/23 11:07 EDT

## 2023-05-26 DIAGNOSIS — R60.0 LOWER EXTREMITY EDEMA: ICD-10-CM

## 2023-05-26 RX ORDER — HYDROCHLOROTHIAZIDE 12.5 MG/1
TABLET ORAL
Qty: 30 TABLET | Refills: 0 | Status: SHIPPED | OUTPATIENT
Start: 2023-05-26

## 2023-06-20 NOTE — PLAN OF CARE
EMERGENCY DEPARTMENT ENCOUnter      NAME: Carlita Allen  AGE: 68 year old female  YOB: 1954  MRN: [id number]  EVALUATION DATE & TIME: No admission date for patient encounter.    PCP: Brock Escobedo    ED PROVIDER: Bebe Valencia MD      Chief Complaint   Patient presents with     Fever     Diarrhea         FINAL IMPRESSION:  1. Diarrhea of infectious origin    2. Non-specific colitis          ED COURSE & MEDICAL DECISION MAKING:      In summary, the patient is a 68-year-old female who presents to the emergency department for evaluation of low abdominal pain and diarrhea thought secondary to colitis.  She has no evidence of bowel obstruction, diverticulitis, or bowel perforation.  We will treat with oral antibiotics and close outpatient follow-up.    Medical Decision Making    History:    Supplemental history from: Documented in chart, if applicable    External Record(s) reviewed: Documented in chart, if applicable.    Work Up:    Chart documentation includes differential considered and any EKGs or imaging independently interpreted by provider, where specified.    In additional to work up documented, I considered the following work up: Documented in chart, if applicable.    External consultation:    Discussion of management with another provider: Documented in chart, if applicable    Complicating factors:    Care impacted by chronic illness: Chronic Pain, Hypertension and Mental Health    Care affected by social determinants of health: N/A    Disposition considerations: Discharge. I prescribed additional prescription strength medication(s) as charted. See documentation for any additional details.      12:37 AM I met with patient for initial encounter.  Normal saline 1 L IV was administered for IV hydration.  Morphine 4 mg IV was administered for pain.  Zofran 4 mg IV was administered for nausea.  Reevaluation reveals that her pain is improved in the emergency department.  Cipro 500 mg p.o. was  Problem: Perioperative Period (Adult)  Goal: Signs and Symptoms of Listed Potential Problems Will be Absent or Manageable (Perioperative Period)  Outcome: Ongoing (interventions implemented as appropriate)   02/16/18 1255   Perioperative Period   Problems Assessed (Perioperative Period) all   Problems Present (Perioperative Period) none          "administered for treatment of suspected infectious diarrhea.  2:21 AM I updated patient with results and plan for discharge.    At the conclusion of the encounter I discussed the results of all of the tests and the disposition. The questions were answered. The patient or family acknowledged understanding and was agreeable with the care plan.         MEDICATIONS GIVEN IN THE EMERGENCY:  Medications   0.9% sodium chloride BOLUS (0 mLs Intravenous Stopped 6/20/23 0251)   ondansetron (ZOFRAN) injection 4 mg (4 mg Intravenous $Given 6/20/23 0130)   morphine (PF) injection 4 mg (4 mg Intravenous $Given 6/20/23 0130)   ciprofloxacin (CIPRO) tablet 500 mg (500 mg Oral $Given 6/20/23 0305)       NEW PRESCRIPTIONS STARTED AT TODAY'S ER VISIT  Discharge Medication List as of 6/20/2023  2:52 AM      START taking these medications    Details   ciprofloxacin (CIPRO) 500 MG tablet Take 1 tablet (500 mg) by mouth 2 times daily for 3 days, Disp-6 tablet, R-0, Local Print                =================================================================    HPI        Carlita Allen is a 68 year old female with a pertinent history of chronic back pain, HTN, schizophrenic, who presents to this ED via walk-in for evaluation of abdominal pain, diarrhea.    Patient endorses lower abdominal pian and watery diarrhea that have been constant for the last 5 days. She describes the pain as 7/10 without any provoking or palliating factors. Patient also endorses a headache, chills, and sweats without a measured fever.    Patient lives alone. She denies any recent travel or history of similar symptoms. Patient endorses alcohol use but denies tobacco use. She reports allergies to penicillin and contrast, as well as \"many heart medications.\"    Patient denies vomiting, urine changes, and all other complaints at this time.      REVIEW OF SYSTEMS     Constitutional:  Denies fever. Positive for chills, sweats.  HENT:  Denies sore throat   Respiratory:  " Denies cough or shortness of breath   Cardiovascular:  Denies chest pain or palpitations  GI:  Denies vomiting. Positive for abdominal pain, nausea, diarrhea.  Musculoskeletal:  Denies any new extremity pain   Skin:  Denies rash   Neurologic:  Denies focal weakness or sensory changes. Positive for headache.  All other systems reviewed and are negative      PAST MEDICAL HISTORY:  History reviewed. No pertinent past medical history.    PAST SURGICAL HISTORY:  History reviewed. No pertinent surgical history.        CURRENT MEDICATIONS:    ciprofloxacin (CIPRO) 500 MG tablet  amLODIPine (NORVASC) 5 MG tablet  LEVOTHYROXINE SODIUM PO  loratadine (CLARITIN) 10 MG tablet  MYRBETRIQ 25 MG 24 hr tablet  predniSONE (DELTASONE) 20 MG tablet        ALLERGIES:  Allergies   Allergen Reactions     Iodinated Contrast Media Other (See Comments)     Contrast dye       Contrast Dye Other (See Comments)     Gadolinium      Lisinopril Nausea and Vomiting and Nausea     Other reaction(s): Gastrointestinal, GI Upset     Losartan Other (See Comments)     Weight gain, abdominal edema, PN: Weight gain, abdominal edema  Weight gain, abdominal edema, PN: Weight gain, abdominal edema  Weight gain, abdominal edema, PN: Weight gain, abdominal edema       Pcn [Penicillins] Other (See Comments)     Gluten Meal Rash       FAMILY HISTORY:  History reviewed. No pertinent family history.    SOCIAL HISTORY:   Social History     Socioeconomic History     Marital status: Single     Spouse name: None     Number of children: None     Years of education: None     Highest education level: None   Tobacco Use     Smoking status: Never     Smokeless tobacco: Never   Substance and Sexual Activity     Alcohol use: No     Drug use: No         PHYSICAL EXAM    Constitutional:  Well developed, Well nourished,  HENT:  Normocephalic, Atraumatic, Bilateral external ears normal, Oropharynx moist, Nose normal.   Neck:  Normal range of motion, No meningismus, No stridor.    Eyes:  EOMI, Conjunctiva normal, No discharge.   Respiratory:  Normal breath sounds, No respiratory distress, No wheezing, No chest tenderness.   Cardiovascular:  Normal heart rate, Normal rhythm, No murmurs  GI:  Soft,  tenderness bilateral lower abdomen, No guarding, No CVA tenderness.   Musculoskeletal:  Neurovascularly intact distally, No edema, No tenderness, No cyanosis, Good range of motion in all major joints.   Integument:  Warm, Dry, No erythema, No rash.   Lymphatic:  No lymphadenopathy noted.   Neurologic:  Alert & oriented , Normal motor function, Normal sensory function, No focal deficits noted.   Psychiatric:  Affect normal, Judgment normal, Mood normal.      LAB:  All pertinent labs reviewed and interpreted.  Results for orders placed or performed during the hospital encounter of 06/20/23   CT Abdomen Pelvis w/o Contrast    Impression    IMPRESSION:   1.  I believe there are findings of colitis with no evidence for free air or abscess.    2.  Small esophageal hiatal hernia.    3.  2 benign cysts in the left kidney and no follow-up is recommended.    4.  1 x 1 mm nonobstructive stone in the left kidney.     Comprehensive metabolic panel   Result Value Ref Range    Sodium 137 136 - 145 mmol/L    Potassium 4.0 3.5 - 5.0 mmol/L    Chloride 101 98 - 107 mmol/L    Carbon Dioxide (CO2) 22 22 - 31 mmol/L    Anion Gap 14 5 - 18 mmol/L    Urea Nitrogen 11 8 - 28 mg/dL    Creatinine 1.30 (H) 0.60 - 1.10 mg/dL    Calcium 10.2 8.5 - 10.5 mg/dL    Glucose 129 (H) 70 - 125 mg/dL    Alkaline Phosphatase 75 45 - 120 U/L    AST 33 0 - 40 U/L    ALT 38 0 - 45 U/L    Protein Total 7.7 6.0 - 8.0 g/dL    Albumin 4.1 3.5 - 5.0 g/dL    Bilirubin Total 0.5 0.0 - 1.0 mg/dL    GFR Estimate 44 (L) >60 mL/min/1.73m2   Result Value Ref Range    Lipase 27 0 - 52 U/L   Result Value Ref Range    Magnesium 1.9 1.8 - 2.6 mg/dL   Lactic acid whole blood   Result Value Ref Range    Lactic Acid 1.2 0.7 - 2.0 mmol/L   Troponin I (now)    Result Value Ref Range    Troponin I 0.02 0.00 - 0.29 ng/mL   UA with Microscopic reflex to Culture    Specimen: Urine, Clean Catch   Result Value Ref Range    Color Urine Yellow Colorless, Straw, Light Yellow, Yellow    Appearance Urine Turbid (A) Clear    Glucose Urine Negative Negative mg/dL    Bilirubin Urine Negative Negative    Ketones Urine 20 (A) Negative mg/dL    Specific Gravity Urine 1.025 1.001 - 1.030    Blood Urine >1.0 mg/dL (A) Negative    pH Urine 6.0 5.0 - 7.0    Protein Albumin Urine 200 (A) Negative mg/dL    Urobilinogen Urine <2.0 <2.0 mg/dL    Nitrite Urine Negative Negative    Leukocyte Esterase Urine Negative Negative    Mucus Urine Present (A) None Seen /LPF    RBC Urine 18 (H) <=2 /HPF    WBC Urine 5 <=5 /HPF    Squamous Epithelials Urine 1 <=1 /HPF    Hyaline Casts Urine 46 (H) <=2 /LPF    Granular Casts Urine 13 (H) None Seen /LPF   CRP inflammation   Result Value Ref Range    CRP 11.6 (H) 0.0 - <0.8 mg/dL   Occult blood stool   Result Value Ref Range    Occult Blood Positive (A) Negative   CBC with platelets and differential   Result Value Ref Range    WBC Count 9.3 4.0 - 11.0 10e3/uL    RBC Count 6.08 (H) 3.80 - 5.20 10e6/uL    Hemoglobin 17.6 (H) 11.7 - 15.7 g/dL    Hematocrit 51.0 (H) 35.0 - 47.0 %    MCV 84 78 - 100 fL    MCH 28.9 26.5 - 33.0 pg    MCHC 34.5 31.5 - 36.5 g/dL    RDW 13.1 10.0 - 15.0 %    Platelet Count 273 150 - 450 10e3/uL    % Neutrophils 73 %    % Lymphocytes 14 %    % Monocytes 12 %    % Eosinophils 0 %    % Basophils 1 %    % Immature Granulocytes 0 %    NRBCs per 100 WBC 0 <1 /100    Absolute Neutrophils 6.7 1.6 - 8.3 10e3/uL    Absolute Lymphocytes 1.3 0.8 - 5.3 10e3/uL    Absolute Monocytes 1.1 0.0 - 1.3 10e3/uL    Absolute Eosinophils 0.0 0.0 - 0.7 10e3/uL    Absolute Basophils 0.1 0.0 - 0.2 10e3/uL    Absolute Immature Granulocytes 0.0 <=0.4 10e3/uL    Absolute NRBCs 0.0 10e3/uL   ECG 12-LEAD WITH MUSE (LHE)   Result Value Ref Range    Systolic Blood  Pressure  mmHg    Diastolic Blood Pressure  mmHg    Ventricular Rate 117 BPM    Atrial Rate 117 BPM    SD Interval 148 ms    QRS Duration 122 ms     ms    QTc 491 ms    P Axis 69 degrees    R AXIS 96 degrees    T Axis -28 degrees    Interpretation ECG       Sinus tachycardia with frequent Premature ventricular complexes  Rightward axis  Non-specific intra-ventricular conduction delay  ST & T wave abnormality, consider inferolateral ischemia  Abnormal ECG  When compared with ECG of 29-MAY-2023 14:27,  Significant changes have occurred  Confirmed by SEE ED PROVIDER NOTE FOR, ECG INTERPRETATION ([id number]),  Valentino Brambila ([id number]) on 2023 5:56:14 AM     Enteric Bacteria and Virus Panel by JAYCEE Stool    Specimen: Per Rectum; Stool   Result Value Ref Range    Campylobacter group Detected (A) Not Detected    Salmonella species Not Detected Not Detected    Shigella species Not Detected Not Detected    Vibrio group Not Detected Not Detected    Rotavirus Not Detected Not Detected    Shiga toxin 1 gene Not Detected Not Detected    Shiga toxin 2 gene Not Detected Not Detected    Norovirus I and II Not Detected Not Detected    Yersinia enterocolitica Not Detected Not Detected       RADIOLOGY:  I have independently reviewed and interpreted the above imaging, pending the final radiology read.  CT Abdomen Pelvis w/o Contrast   Final Result   IMPRESSION:    1.  I believe there are findings of colitis with no evidence for free air or abscess.      2.  Small esophageal hiatal hernia.      3.  2 benign cysts in the left kidney and no follow-up is recommended.      4.  1 x 1 mm nonobstructive stone in the left kidney.             EK-rate is 117, sinus, there is ST depression noted in V4 through V6, PVCs  EKG appears similar to her previous EKGs  I have independently reviewed and interpreted this EKG          I, Henrique Moe, am serving as a scribe to document services personally performed by Dr. Valencia based  on my observation and the provider's statements to me. I, Bebe Valencia MD attest that Henrique Abhi is acting in a scribe capacity, has observed my performance of the services and has documented them in accordance with my direction.    Bebe Valencia MD  Emergency Medicine  [facility] EMERGENCY ROOM  [address] [facility] [address] [facility] [id number] [phone]  Dept: [phone]      Bebe Valencia MD  06/21/23 0417

## 2023-09-06 ENCOUNTER — HOSPITAL ENCOUNTER (OUTPATIENT)
Facility: HOSPITAL | Age: 63
Discharge: HOME | End: 2023-09-06
Admitting: OBSTETRICS & GYNECOLOGY
Payer: MEDICARE

## 2023-09-06 ENCOUNTER — OFFICE VISIT (OUTPATIENT)
Dept: OBSTETRICS AND GYNECOLOGY | Age: 63
End: 2023-09-06
Payer: MEDICARE

## 2023-09-06 VITALS
SYSTOLIC BLOOD PRESSURE: 124 MMHG | DIASTOLIC BLOOD PRESSURE: 76 MMHG | HEIGHT: 62 IN | BODY MASS INDEX: 30.36 KG/M2 | WEIGHT: 165 LBS

## 2023-09-06 DIAGNOSIS — Z01.419 WELL WOMAN EXAM WITH ROUTINE GYNECOLOGICAL EXAM: Primary | ICD-10-CM

## 2023-09-06 DIAGNOSIS — Z79.890 HORMONE REPLACEMENT THERAPY (HRT): ICD-10-CM

## 2023-09-06 DIAGNOSIS — Z12.31 VISIT FOR SCREENING MAMMOGRAM: ICD-10-CM

## 2023-09-06 PROCEDURE — 77067 SCR MAMMO BI INCL CAD: CPT

## 2023-09-06 PROCEDURE — 77063 BREAST TOMOSYNTHESIS BI: CPT

## 2023-09-06 RX ORDER — ESTRADIOL 0.04 MG/D
1 FILM, EXTENDED RELEASE TRANSDERMAL 2 TIMES WEEKLY
Qty: 48 PATCH | Refills: 2 | Status: SHIPPED | OUTPATIENT
Start: 2023-09-07

## 2023-09-06 NOTE — PROGRESS NOTES
Routine Annual Visit    2023    Patient: Marguerite Abarca          MR#:[id number]      Chief Complaint   Patient presents with    Gynecologic Exam     Annual exam, Last Pap 2018 NEG, HPV NEG, (HYST), Mammogram today, Last Colonoscopy 2022, Pt has no complaints today, Doing well        History of Present Illness    63 y.o. female  who presents for annual exam.   Follows with Dr. Espinal for RA, taking methotrexate and eating a healthy diet  She is staying active but no really exercising  2 yr old grandson  Not sexually active but not an issue    Mammo today, needs dexa    No LMP recorded (lmp unknown). Patient has had a hysterectomy.  Obstetric History:  OB History          0    Para   0    Term   0       0    AB   0    Living   0         SAB   0    IAB   0    Ectopic   0    Molar        Multiple   0    Live Births                   Menstrual History:     No LMP recorded (lmp unknown). Patient has had a hysterectomy.       ________________________________________  Patient Active Problem List   Diagnosis    GERD (gastroesophageal reflux disease)    Vitamin D deficiency    Uterine fibroid    Postmenopausal HRT (hormone replacement therapy)    Rheumatoid arthritis    Mixed stress and urge urinary incontinence    Cystocele with prolapse    Vaginal vault prolapse after hysterectomy    Radiculopathy, lumbar region    Hypothyroidism, unspecified    Diverticulosis    Herpes genitalis    Osteoporosis    Family history of colon cancer in father    History of hysterectomy    Age-related nuclear cataract of both eyes    Bilateral nonexudative age-related macular degeneration    Primary open angle glaucoma (POAG) of both eyes    Seasonal allergic rhinitis due to pollen       Past Medical History:   Diagnosis Date    Disease of thyroid gland     GERD (gastroesophageal reflux disease)     Herpes genitalis     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 Lesion in the right interlabial sulcus. Min symptoms, resolved  in couple days.  No lymphadenopathy.  Probable recurrence of old infection, discussed IgG and IgM testing to determine if recent infection.    Osteoporosis     Rheumatoid arthritis     Dr Chava Espinal; Methotrexate,  Decreased relafen 2017.    Vitamin D deficiency        Family History   Problem Relation Age of Onset    Valvular heart disease Father          of CHF.    Colon cancer Father     Colon polyps Sister     COPD Mother     No Known Problems Brother     No Known Problems Maternal Grandmother     No Known Problems Maternal Grandfather     No Known Problems Paternal Grandmother     No Known Problems Paternal Grandfather     No Known Problems Maternal Aunt         x3    No Known Problems Paternal Aunt         x3    Kidney disease Maternal Uncle     BRCA 1/2 Neg Hx     Breast cancer Neg Hx     Endometrial cancer Neg Hx     Ovarian cancer Neg Hx     Malig Hyperthermia Neg Hx     Uterine cancer Neg Hx        Past Surgical History:   Procedure Laterality Date    ANTERIOR AND POSTERIOR VAGINAL REPAIR N/A 2018    Procedure: POSTERIOR VAGINAL REPAIR MID URETHRAL SLING CYSTOSCOPY;  Surgeon: David Pelayo MD;  Location: [facility] MAIN OR;  Service: Gynecology    CHOLECYSTECTOMY WITH INTRAOPERATIVE CHOLANGIOGRAM N/A 2018    Procedure: CHOLECYSTECTOMY LAPAROSCOPIC ;  Surgeon: Silvano Rushing MD;  Location: [facility] OR OSC;  Service:     COLONOSCOPY N/A 2016    Diverticulosis in the sigmoid colon, The examination was otherwise normal on direct and retoflexion views    COLONOSCOPY N/A 2010    Diverticulosis sigmoid colon, otherwise normal-Dr. Arnold Mcneal    COLONOSCOPY N/A 2007    One 7 mm polyp in the distal sigmoid colon-Dr. Arnold Mcneal    COLONOSCOPY N/A 3/14/2022    Procedure: COLONOSCOPY TO CECUM;  Surgeon: Kay Miranda MD;  [facility];  Service: General;  Laterality: N/A;  PRE- FAMILY HX COLON CANCER  POST- DIVERTICULOSIS    CYST REMOVAL Bilateral     Both  wrists, benign.  Sounds like ganglion cyst.    DIAGNOSTIC LAPAROSCOPY  1983    Exploratory Laparoscopy with myomectomy (twice) and cautery of endometriosis. The ovaries were preserved.    ENDOSCOPY N/A 12/30/2017    Procedure: ESOPHAGOGASTRODUODENOSCOPY with biopsy; no H. pylori on biopsy.  Surgeon: Ignacio Rodriguez MD;  Location: [facility] ENDOSCOPY;  Service:     EXCISION TUMOR NECK / THORAX  1987     Removal of Tumor from her back that was Benign, Lumbo-Sacral area.     HEMORRHOIDECTOMY N/A 05/18/2010    w/ fissurectomy-Dr. Arnold Mcneal    MYOMECTOMY  1995    Exploratory Laparoscopy with myomectomy (twice) and cautery of endometriosis. The ovaries were preserved.    SACROSPINUS SUSPENSION N/A 2/27/2020    Procedure: SACROSPINUS LIGAMENT SUSPENSION REPAIR CYSTOSCOPY;  Surgeon: David Pelayo MD;  Location: [facility] MAIN OR;  Service: Gynecology;  Laterality: N/A;    SINUS SURGERY      TOTAL ABDOMINAL HYSTERECTOMY WITH SALPINGO OOPHORECTOMY Bilateral 1999    Total Abdominal Hysterectomy with Removal of Both Tubes and Ovaries. for chronic pelvic pain and adhesions.    VOCAL CORD BIOPSY Right 07/31/2003    Microlaryngoscopy, biopsy of right true cord-Dr. Vamshi Barron       Social History     Tobacco Use   Smoking Status Never   Smokeless Tobacco Never       has a current medication list which includes the following prescription(s): [START ON 9/7/2023] estradiol, fluticasone, folic acid, hydrochlorothiazide, levocetirizine, levothyroxine, methotrexate, nabumetone, pantoprazole, valacyclovir, and vitamin d.  ________________________________________      Review of Systems   Constitutional:  Negative for fever and unexpected weight change.   Respiratory:  Negative for shortness of breath.    Cardiovascular:  Negative for chest pain.   Gastrointestinal:  Negative for abdominal pain, constipation and diarrhea.   Genitourinary:  Negative for frequency and urgency.   Hematological:  Negative for adenopathy.  "  Psychiatric/Behavioral:  Negative for dysphoric mood.      Objective   Physical Exam    /76   Ht 157.5 cm (62.01\")   Wt 74.8 kg (165 lb)   LMP  (LMP Unknown)   BMI 30.17 kg/m²    BP Readings from Last 3 Encounters:   09/06/23 124/76   02/20/23 138/70   08/19/22 125/76      Wt Readings from Last 3 Encounters:   09/06/23 74.8 kg (165 lb)   02/20/23 73.1 kg (161 lb 3.2 oz)   08/19/22 75.8 kg (167 lb)         BMI: Body mass index is 30.17 kg/m².       General:   alert, appears stated age, and cooperative   Neck: No thyromegaly or LAD   Abdomen: soft, non-tender, without masses or organomegaly   Breast: inspection negative, no nipple discharge or bleeding, no masses or nodularity palpable   Urethra and bladder: urethral meatus normal; bladder nontender to palpation;   Vulva: normal, Bartholin's, Urethra, [place]'s normal   Vagina: Normal but atrophic mucosa, no significant prolapse   Cervix: absent   Uterus: absent    Adnexa: normal adnexa and no mass, fullness, tenderness       Assessment:    normal annual exam   HRT- hot flashes  menopause    Plan:    Plan     [x]  Mammogram request made  []  PAP done  []  Labs:   []  GC/Chl/TV  [x]  DEXA scan   []  Referral for colonoscopy:     Desires to continue hormonal therapy.  She has terrible hot flashes when she has tried to discontinue in the past.  She has no absolute contraindications to continue and refill sent in    No urinary complaints    Desires to have her DEXA done with Dr. Espinal, rheumatologist    Counseling  [x]  Nutrition  [x]  Physical activity/regular exercise   [x]  Healthy weight  []  Injury prevention  []  Smoking cessation  []  Substance misuse/abuse  [x]  Sexual behavior  []  STD prevention  []  Contraception  []  Dental health  []  Mental health  []  Immunization  [x]  Encouraged SBE        Ynes Batista MD  09/06/2023  14:32 EDT    "

## 2023-09-07 ENCOUNTER — LAB (OUTPATIENT)
Dept: LAB | Facility: HOSPITAL | Age: 63
End: 2023-09-07
Payer: MEDICARE

## 2023-09-07 ENCOUNTER — OFFICE VISIT (OUTPATIENT)
Dept: FAMILY MEDICINE CLINIC | Age: 63
End: 2023-09-07
Payer: MEDICARE

## 2023-09-07 VITALS
TEMPERATURE: 98.1 F | SYSTOLIC BLOOD PRESSURE: 162 MMHG | HEIGHT: 62 IN | WEIGHT: 167 LBS | OXYGEN SATURATION: 98 % | HEART RATE: 68 BPM | BODY MASS INDEX: 30.73 KG/M2 | DIASTOLIC BLOOD PRESSURE: 100 MMHG

## 2023-09-07 DIAGNOSIS — R03.0 ELEVATED BLOOD PRESSURE READING: ICD-10-CM

## 2023-09-07 DIAGNOSIS — Z13.6 SCREENING FOR CARDIOVASCULAR CONDITION: ICD-10-CM

## 2023-09-07 DIAGNOSIS — E07.9 DISEASE OF THYROID GLAND: ICD-10-CM

## 2023-09-07 DIAGNOSIS — R60.0 LOWER EXTREMITY EDEMA: ICD-10-CM

## 2023-09-07 DIAGNOSIS — E07.9 DISEASE OF THYROID GLAND: Primary | ICD-10-CM

## 2023-09-07 LAB
ALBUMIN SERPL-MCNC: 4.2 G/DL (ref 3.5–5.2)
ALBUMIN/GLOB SERPL: 1.6 G/DL
ALP SERPL-CCNC: 93 U/L (ref 39–117)
ALT SERPL W P-5'-P-CCNC: 10 U/L (ref 1–33)
ANION GAP SERPL CALCULATED.3IONS-SCNC: 12 MMOL/L (ref 5–15)
AST SERPL-CCNC: 18 U/L (ref 1–32)
BILIRUB SERPL-MCNC: 0.4 MG/DL (ref 0–1.2)
BUN SERPL-MCNC: 13 MG/DL (ref 8–23)
BUN/CREAT SERPL: 14.9 (ref 7–25)
CALCIUM SPEC-SCNC: 9.1 MG/DL (ref 8.6–10.5)
CHLORIDE SERPL-SCNC: 105 MMOL/L (ref 98–107)
CHOLEST SERPL-MCNC: 180 MG/DL (ref 0–200)
CO2 SERPL-SCNC: 25 MMOL/L (ref 22–29)
CREAT SERPL-MCNC: 0.87 MG/DL (ref 0.57–1)
DEPRECATED RDW RBC AUTO: 48.5 FL (ref 37–54)
EGFRCR SERPLBLD CKD-EPI 2021: 74 ML/MIN/1.73
ERYTHROCYTE [DISTWIDTH] IN BLOOD BY AUTOMATED COUNT: 13.5 % (ref 12.3–15.4)
GLOBULIN UR ELPH-MCNC: 2.7 GM/DL
GLUCOSE SERPL-MCNC: 94 MG/DL (ref 65–99)
HCT VFR BLD AUTO: 38.8 % (ref 34–46.6)
HDLC SERPL-MCNC: 43 MG/DL (ref 40–60)
HGB BLD-MCNC: 12.4 G/DL (ref 12–15.9)
LDLC SERPL CALC-MCNC: 122 MG/DL (ref 0–100)
LDLC/HDLC SERPL: 2.81 {RATIO}
MCH RBC QN AUTO: 30.8 PG (ref 26.6–33)
MCHC RBC AUTO-ENTMCNC: 32 G/DL (ref 31.5–35.7)
MCV RBC AUTO: 96.5 FL (ref 79–97)
PLATELET # BLD AUTO: 224 10*3/MM3 (ref 140–450)
PMV BLD AUTO: 8.6 FL (ref 6–12)
POTASSIUM SERPL-SCNC: 4.2 MMOL/L (ref 3.5–5.2)
PROT SERPL-MCNC: 6.9 G/DL (ref 6–8.5)
RBC # BLD AUTO: 4.02 10*6/MM3 (ref 3.77–5.28)
SODIUM SERPL-SCNC: 142 MMOL/L (ref 136–145)
TRIGL SERPL-MCNC: 80 MG/DL (ref 0–150)
TSH SERPL DL<=0.05 MIU/L-ACNC: 5.36 UIU/ML (ref 0.27–4.2)
VLDLC SERPL-MCNC: 15 MG/DL (ref 5–40)
WBC NRBC COR # BLD: 3.79 10*3/MM3 (ref 3.4–10.8)

## 2023-09-07 PROCEDURE — 1159F MED LIST DOCD IN RCRD: CPT | Performed by: NURSE PRACTITIONER

## 2023-09-07 PROCEDURE — 84443 ASSAY THYROID STIM HORMONE: CPT

## 2023-09-07 PROCEDURE — 36415 COLL VENOUS BLD VENIPUNCTURE: CPT

## 2023-09-07 PROCEDURE — 80061 LIPID PANEL: CPT

## 2023-09-07 PROCEDURE — 1160F RVW MEDS BY RX/DR IN RCRD: CPT | Performed by: NURSE PRACTITIONER

## 2023-09-07 PROCEDURE — 85027 COMPLETE CBC AUTOMATED: CPT

## 2023-09-07 PROCEDURE — 80053 COMPREHEN METABOLIC PANEL: CPT

## 2023-09-07 PROCEDURE — 99214 OFFICE O/P EST MOD 30 MIN: CPT | Performed by: NURSE PRACTITIONER

## 2023-09-07 RX ORDER — LEVOTHYROXINE SODIUM 0.05 MG/1
50 TABLET ORAL DAILY
Qty: 90 TABLET | Refills: 1 | Status: SHIPPED | OUTPATIENT
Start: 2023-09-07 | End: 2023-09-08 | Stop reason: SDUPTHER

## 2023-09-07 RX ORDER — HYDROCHLOROTHIAZIDE 12.5 MG/1
TABLET ORAL
Qty: 90 TABLET | Refills: 1 | Status: SHIPPED | OUTPATIENT
Start: 2023-09-07

## 2023-09-07 NOTE — PROGRESS NOTES
Chief Complaint  Marguerite Abarca presents to [facility] for Hypothyroidism (Medication follow-up )    Subjective          History of Present Illness    Marguerite is here today for follow up.   She is on levothyroxine 50 mcg daily for thyroid disease. Last TSH 5.53.   She is also on HCTZ 12.5 mg daily PRN. This has been beneficial for LE swelling.  She notes that she is feeling well.   Following with Dr Espinal (rheumatology) for RA. She is taking methotrexate.   Saw GYN for her exam and mammogram yesterday.     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 Lesion in the right interlabial sulcus. Min symptoms, resolved in couple days.  No lymphadenopathy.  Probable recurrence of old infection, discussed IgG and IgM testing to determine if recent infection.    Osteoporosis     Rheumatoid arthritis     Dr Chava Espinal; Methotrexate,  Decreased relafen 12/1/2017.    Vitamin D deficiency      Current Outpatient Medications   Medication Sig Dispense Refill    estradiol (VIVELLE-DOT) 0.0375 MG/24HR patch Place 1 patch on the skin as directed by provider 2 (Two) Times a Week. 48 patch 2    fluticasone (Flonase) 50 MCG/ACT nasal spray 2 sprays into the nostril(s) as directed by provider Daily. (Patient taking differently: 2 sprays into the nostril(s) as directed by provider As Needed.) 48 g 3    folic acid (FOLVITE) 1 MG tablet Take 1 tablet by mouth Daily.      hydroCHLOROthiazide (HYDRODIURIL) 12.5 MG tablet Take 1 tablet by mouth daily as needed for leg swelling 90 tablet 1    levocetirizine (XYZAL) 5 MG tablet Take 1 tablet by mouth Daily. 90 tablet 3    levothyroxine (SYNTHROID, LEVOTHROID) 50 MCG tablet Take 1 tablet by mouth  Daily. 90 tablet 1    methotrexate 2.5 MG tablet Take 6 tablets by mouth 1 (One) Time Per Week. Thursday TAKES 2.5 MG 6 TABS      nabumetone (RELAFEN) 750 MG tablet Take 1 tablet by mouth 2 (Two) Times a Day. HOLD PRIOR TO SURGERY      pantoprazole (PROTONIX) 40 MG EC tablet Take 1 tablet by mouth 2 (Two) Times a Day Before Meals. (Patient taking differently: Take 1 tablet by mouth Daily.) 60 tablet 2    valACYclovir (VALTREX) 500 MG tablet Take 1 tablet by mouth 2 (Two) Times a Day As Needed.      vitamin D (ERGOCALCIFEROL) 94495 UNITS capsule capsule Take 1 capsule by mouth 1 (One) Time Per Week. THURSDAY       No current facility-administered medications for this visit.     Past Surgical History:   Procedure Laterality Date    ANTERIOR AND POSTERIOR VAGINAL REPAIR N/A 8/7/2018    Procedure: POSTERIOR VAGINAL REPAIR MID URETHRAL SLING CYSTOSCOPY;  Surgeon: David Pelayo MD;  Location: [facility] MAIN OR;  Service: Gynecology    CHOLECYSTECTOMY WITH INTRAOPERATIVE CHOLANGIOGRAM N/A 2/16/2018    Procedure: CHOLECYSTECTOMY LAPAROSCOPIC ;  Surgeon: Silvano Rushing MD;  Location: [facility] OR OSC;  Service:     COLONOSCOPY N/A 8/25/2016    Diverticulosis in the sigmoid colon, The examination was otherwise normal on direct and retoflexion views    COLONOSCOPY N/A 05/07/2010    Diverticulosis sigmoid colon, otherwise normal-Dr. Arnold Mcneal    COLONOSCOPY N/A 05/04/2007    One 7 mm polyp in the distal sigmoid colon-Dr. Arnold Mcneal    COLONOSCOPY N/A 3/14/2022    Procedure: COLONOSCOPY TO CECUM;  Surgeon: Kay Miranda MD;  Location: [facility] ENDOSCOPY;  Service: General;  Laterality: N/A;  PRE- FAMILY HX COLON CANCER  POST- DIVERTICULOSIS    CYST REMOVAL Bilateral     Both wrists, benign.  Sounds like ganglion cyst.    DIAGNOSTIC LAPAROSCOPY  1983    Exploratory Laparoscopy with myomectomy (twice) and cautery of endometriosis. The ovaries were preserved.    ENDOSCOPY N/A 12/30/2017    Procedure:  ESOPHAGOGASTRODUODENOSCOPY with biopsy; no H. pylori on biopsy.  Surgeon: Ignacio Rodriguez MD;  Location: [facility] ENDOSCOPY;  Service:     EXCISION TUMOR NECK / THORAX       Removal of Tumor from her back that was Benign, Lumbo-Sacral area.     HEMORRHOIDECTOMY N/A 2010    w/ fissurectomy-Dr. Arnold Mcneal    MYOMECTOMY      Exploratory Laparoscopy with myomectomy (twice) and cautery of endometriosis. The ovaries were preserved.    SACROSPINUS SUSPENSION N/A 2020    Procedure: SACROSPINUS LIGAMENT SUSPENSION REPAIR CYSTOSCOPY;  Surgeon: David Pelayo MD;  Location: [facility] MAIN OR;  Service: Gynecology;  Laterality: N/A;    SINUS SURGERY      TOTAL ABDOMINAL HYSTERECTOMY WITH SALPINGO OOPHORECTOMY Bilateral     Total Abdominal Hysterectomy with Removal of Both Tubes and Ovaries. for chronic pelvic pain and adhesions.    VOCAL CORD BIOPSY Right 2003    Microlaryngoscopy, biopsy of right true cord-Dr. Vamshi Barron      Social History     Tobacco Use    Smoking status: Never    Smokeless tobacco: Never   Vaping Use    Vaping Use: Never used   Substance Use Topics    Alcohol use: Not Currently     Comment: socially    Drug use: No     Family History   Problem Relation Age of Onset    Valvular heart disease Father          of CHF.    Colon cancer Father     Colon polyps Sister     COPD Mother     No Known Problems Brother     No Known Problems Maternal Grandmother     No Known Problems Maternal Grandfather     No Known Problems Paternal Grandmother     No Known Problems Paternal Grandfather     No Known Problems Maternal Aunt         x3    No Known Problems Paternal Aunt         x3    Kidney disease Maternal Uncle     BRCA 1/2 Neg Hx     Breast cancer Neg Hx     Endometrial cancer Neg Hx     Ovarian cancer Neg Hx     Malig Hyperthermia Neg Hx     Uterine cancer Neg Hx      Health Maintenance Due   Topic Date Due    DXA SCAN  Never done    ANNUAL WELLNESS VISIT  Never done    MAMMOGRAM  " 02/25/2023      Immunization History   Administered Date(s) Administered    Flucelvax Quad Vial =>4yrs 10/01/2020    Fluzone >6mos 01/04/2022    Pneumococcal Polysaccharide (PPSV23) 02/24/2021        Objective     Vitals:    09/07/23 0850 09/07/23 0931   BP: 163/91 162/100   BP Location: Left arm Left arm   Patient Position: Sitting Sitting   Cuff Size: Adult Adult   Pulse: 69 68   Temp: 98.1 °F (36.7 °C)    TempSrc: Oral    SpO2: 98%    Weight: 75.8 kg (167 lb)    Height: 157.5 cm (62.01\")      Body mass index is 30.54 kg/m².     BMI is >= 30 and <35. (Class 1 Obesity). The following options were offered after discussion;: exercise counseling/recommendations and nutrition counseling/recommendations       Physical Exam  Vitals reviewed.   Constitutional:       General: She is not in acute distress.     Appearance: Normal appearance. She is well-developed.   HENT:      Head: Normocephalic and atraumatic.   Cardiovascular:      Rate and Rhythm: Normal rate and regular rhythm.   Pulmonary:      Effort: Pulmonary effort is normal.      Breath sounds: Normal breath sounds.   Neurological:      Mental Status: She is alert and oriented to person, place, and time.   Psychiatric:         Mood and Affect: Mood and affect normal.         Result Review :                               Assessment and Plan      Diagnoses and all orders for this visit:    1. Disease of thyroid gland (Primary)  Comments:  Medical condition is stable.  Continue same therapy.  Will recheck at next regular appointment  Orders:  -     levothyroxine (SYNTHROID, LEVOTHROID) 50 MCG tablet; Take 1 tablet by mouth Daily.  Dispense: 90 tablet; Refill: 1  -     TSH; Future  -     CBC No Differential; Future    2. Screening for cardiovascular condition  Comments:  She is fasting for labs today  Orders:  -     Lipid panel; Future  -     Comprehensive metabolic panel; Future    3. Lower extremity edema  Comments:  Medical condition is stable.  Continue same " therapy.  Will recheck at next regular appointment. Discussed low sodium diet.  Orders:  -     hydroCHLOROthiazide (HYDRODIURIL) 12.5 MG tablet; Take 1 tablet by mouth daily as needed for leg swelling  Dispense: 90 tablet; Refill: 1    4. Elevated blood pressure reading  Comments:  BP elevated some today. Was good at GYN office yesterday. Will continue to monitor. May need to take HCTZ daily.              Follow Up     Return in about 8 weeks (around 11/2/2023) for Medicare Wellness.

## 2023-09-08 DIAGNOSIS — E07.9 DISEASE OF THYROID GLAND: ICD-10-CM

## 2023-09-08 RX ORDER — LEVOTHYROXINE SODIUM 0.07 MG/1
75 TABLET ORAL DAILY
Qty: 90 TABLET | Refills: 1 | Status: SHIPPED | OUTPATIENT
Start: 2023-09-08

## 2023-09-20 ENCOUNTER — TELEPHONE (OUTPATIENT)
Dept: FAMILY MEDICINE CLINIC | Age: 63
End: 2023-09-20
Payer: COMMERCIAL

## 2023-09-20 NOTE — TELEPHONE ENCOUNTER
Caller: Rima Marguerite ADRIANA    Relationship: Self    Best call back number: [phone]     What medications are you currently taking:   Current Outpatient Medications on File Prior to Visit   Medication Sig Dispense Refill    estradiol (VIVELLE-DOT) 0.0375 MG/24HR patch Place 1 patch on the skin as directed by provider 2 (Two) Times a Week. 48 patch 2    fluticasone (Flonase) 50 MCG/ACT nasal spray 2 sprays into the nostril(s) as directed by provider Daily. (Patient taking differently: 2 sprays into the nostril(s) as directed by provider As Needed.) 48 g 3    folic acid (FOLVITE) 1 MG tablet Take 1 tablet by mouth Daily.      hydroCHLOROthiazide (HYDRODIURIL) 12.5 MG tablet Take 1 tablet by mouth daily as needed for leg swelling 90 tablet 1    levocetirizine (XYZAL) 5 MG tablet Take 1 tablet by mouth Daily. 90 tablet 3    levothyroxine (SYNTHROID, LEVOTHROID) 75 MCG tablet Take 1 tablet by mouth Daily. 90 tablet 1    methotrexate 2.5 MG tablet Take 6 tablets by mouth 1 (One) Time Per Week. Thursday TAKES 2.5 MG 6 TABS      nabumetone (RELAFEN) 750 MG tablet Take 1 tablet by mouth 2 (Two) Times a Day. HOLD PRIOR TO SURGERY      pantoprazole (PROTONIX) 40 MG EC tablet Take 1 tablet by mouth 2 (Two) Times a Day Before Meals. (Patient taking differently: Take 1 tablet by mouth Daily.) 60 tablet 2    valACYclovir (VALTREX) 500 MG tablet Take 1 tablet by mouth 2 (Two) Times a Day As Needed.      vitamin D (ERGOCALCIFEROL) 51849 UNITS capsule capsule Take 1 capsule by mouth 1 (One) Time Per Week. THURSDAY       No current facility-administered medications on file prior to visit.          When did you start taking these medications: A LITTLE OVER A WEEK     Which medication are you concerned about: levothyroxine (SYNTHROID, LEVOTHROID) 75 MCG tablet     Who prescribed you this medication: SKIP WHELAN     What are your concerns: PATIENT STATED AFTER SHE STARTED TO TAKE HIGHER DOSE OF LEVOTHYROXINE SHE IS NOT FELLING  WELL AND WOULD LIKE TO GO BACK DOWN TO LEVOTHYROXINE 50 MG    PLEASE ADVISE

## 2023-09-21 RX ORDER — LEVOTHYROXINE SODIUM 0.05 MG/1
50 TABLET ORAL DAILY
COMMUNITY

## 2023-09-25 DIAGNOSIS — Z12.31 ENCOUNTER FOR SCREENING MAMMOGRAM FOR MALIGNANT NEOPLASM OF BREAST: Primary | ICD-10-CM

## 2023-09-27 ENCOUNTER — OFFICE VISIT (OUTPATIENT)
Dept: FAMILY MEDICINE CLINIC | Age: 63
End: 2023-09-27
Payer: MEDICARE

## 2023-09-27 VITALS
OXYGEN SATURATION: 97 % | WEIGHT: 166 LBS | DIASTOLIC BLOOD PRESSURE: 100 MMHG | SYSTOLIC BLOOD PRESSURE: 150 MMHG | HEART RATE: 75 BPM | HEIGHT: 62 IN | TEMPERATURE: 98.2 F | BODY MASS INDEX: 30.55 KG/M2

## 2023-09-27 DIAGNOSIS — J30.1 SEASONAL ALLERGIC RHINITIS DUE TO POLLEN: ICD-10-CM

## 2023-09-27 DIAGNOSIS — J32.9 SINUSITIS, UNSPECIFIED CHRONICITY, UNSPECIFIED LOCATION: Primary | ICD-10-CM

## 2023-09-27 PROCEDURE — 1160F RVW MEDS BY RX/DR IN RCRD: CPT | Performed by: NURSE PRACTITIONER

## 2023-09-27 PROCEDURE — 99214 OFFICE O/P EST MOD 30 MIN: CPT | Performed by: NURSE PRACTITIONER

## 2023-09-27 PROCEDURE — 1159F MED LIST DOCD IN RCRD: CPT | Performed by: NURSE PRACTITIONER

## 2023-09-27 RX ORDER — AZITHROMYCIN 250 MG/1
TABLET, FILM COATED ORAL
Qty: 6 TABLET | Refills: 0 | Status: SHIPPED | OUTPATIENT
Start: 2023-09-27

## 2023-09-27 NOTE — PROGRESS NOTES
Chief Complaint  Marguerite Abarca presents to [facility] for Cough (X 1.5 week - negative for fever, chills, sore throat, body aches) and Nasal Congestion    Subjective          History of Present Illness    Marguerite is here today with c/o a 1/2 week history of symptoms. She notes nasal congestion, sinus congestion. She notes green drainage. She has been taking her xyzal and flonase. She has been taking Stahist recently. She took at home covid test that was negative.    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 Lesion in the right interlabial sulcus. Min symptoms, resolved in couple days.  No lymphadenopathy.  Probable recurrence of old infection, discussed IgG and IgM testing to determine if recent infection.    Osteoporosis     Rheumatoid arthritis     Dr Chava Espinal; Methotrexate,  Decreased relafen 12/1/2017.    Vitamin D deficiency      Current Outpatient Medications   Medication Sig Dispense Refill    fluticasone (Flonase) 50 MCG/ACT nasal spray 2 sprays into the nostril(s) as directed by provider Daily. (Patient taking differently: 2 sprays into the nostril(s) as directed by provider As Needed.) 48 g 3    folic acid (FOLVITE) 1 MG tablet Take 1 tablet by mouth Daily.      hydroCHLOROthiazide (HYDRODIURIL) 12.5 MG tablet Take 1 tablet by mouth daily as needed for leg swelling 90 tablet 1    levocetirizine (XYZAL) 5 MG tablet Take 1 tablet by mouth Daily. 90 tablet 3    levothyroxine (SYNTHROID, LEVOTHROID) 50 MCG tablet Take 1 tablet by mouth Daily.      methotrexate 2.5 MG tablet Take 6 tablets by mouth 1 (One) Time Per Week. Thursday TAKES 2.5 MG 6 TABS      nabumetone (RELAFEN) 750 MG tablet Take 1 tablet by  mouth Daily. HOLD PRIOR TO SURGERY      pantoprazole (PROTONIX) 40 MG EC tablet Take 1 tablet by mouth 2 (Two) Times a Day Before Meals. (Patient taking differently: Take 1 tablet by mouth Daily.) 60 tablet 2    valACYclovir (VALTREX) 500 MG tablet Take 1 tablet by mouth 2 (Two) Times a Day As Needed.      vitamin D (ERGOCALCIFEROL) 17867 UNITS capsule capsule Take 1 capsule by mouth 1 (One) Time Per Week. THURSDAY      azithromycin (Zithromax Z-Mert) 250 MG tablet Take 2 tablets by mouth on day 1, then 1 tablet daily on days 2-5 6 tablet 0     No current facility-administered medications for this visit.     Past Surgical History:   Procedure Laterality Date    ANTERIOR AND POSTERIOR VAGINAL REPAIR N/A 8/7/2018    Procedure: POSTERIOR VAGINAL REPAIR MID URETHRAL SLING CYSTOSCOPY;  Surgeon: David Pelayo MD;  Location: [facility] MAIN OR;  Service: Gynecology    CHOLECYSTECTOMY WITH INTRAOPERATIVE CHOLANGIOGRAM N/A 2/16/2018    Procedure: CHOLECYSTECTOMY LAPAROSCOPIC ;  Surgeon: Silvano Rushing MD;  Location: [facility] OR OSC;  Service:     COLONOSCOPY N/A 8/25/2016    Diverticulosis in the sigmoid colon, The examination was otherwise normal on direct and retoflexion views    COLONOSCOPY N/A 05/07/2010    Diverticulosis sigmoid colon, otherwise normal-Dr. Arnold Mcneal    COLONOSCOPY N/A 05/04/2007    One 7 mm polyp in the distal sigmoid colon-Dr. Arnold Mcneal    COLONOSCOPY N/A 3/14/2022    Procedure: COLONOSCOPY TO CECUM;  Surgeon: Kay Miranda MD;  Location: [facility] ENDOSCOPY;  Service: General;  Laterality: N/A;  PRE- FAMILY HX COLON CANCER  POST- DIVERTICULOSIS    CYST REMOVAL Bilateral     Both wrists, benign.  Sounds like ganglion cyst.    DIAGNOSTIC LAPAROSCOPY  1983    Exploratory Laparoscopy with myomectomy (twice) and cautery of endometriosis. The ovaries were preserved.    ENDOSCOPY N/A 12/30/2017    Procedure: ESOPHAGOGASTRODUODENOSCOPY with biopsy; no H. pylori on biopsy.  Surgeon: Ignacio Rodriguez MD;   Location: [facility] ENDOSCOPY;  Service:     EXCISION TUMOR NECK / THORAX       Removal of Tumor from her back that was Benign, Lumbo-Sacral area.     HEMORRHOIDECTOMY N/A 2010    w/ fissurectomy-Dr. Arnold Mcneal    MYOMECTOMY      Exploratory Laparoscopy with myomectomy (twice) and cautery of endometriosis. The ovaries were preserved.    SACROSPINUS SUSPENSION N/A 2020    Procedure: SACROSPINUS LIGAMENT SUSPENSION REPAIR CYSTOSCOPY;  Surgeon: David Pelayo MD;  Location: [facility] MAIN OR;  Service: Gynecology;  Laterality: N/A;    SINUS SURGERY      TOTAL ABDOMINAL HYSTERECTOMY WITH SALPINGO OOPHORECTOMY Bilateral     Total Abdominal Hysterectomy with Removal of Both Tubes and Ovaries. for chronic pelvic pain and adhesions.    VOCAL CORD BIOPSY Right 2003    Microlaryngoscopy, biopsy of right true cord-Dr. Vamshi Barron      Social History     Tobacco Use    Smoking status: Never    Smokeless tobacco: Never   Vaping Use    Vaping Use: Never used   Substance Use Topics    Alcohol use: Not Currently     Comment: socially    Drug use: No     Family History   Problem Relation Age of Onset    Valvular heart disease Father          of CHF.    Colon cancer Father     Colon polyps Sister     COPD Mother     No Known Problems Brother     No Known Problems Maternal Grandmother     No Known Problems Maternal Grandfather     No Known Problems Paternal Grandmother     No Known Problems Paternal Grandfather     No Known Problems Maternal Aunt         x3    No Known Problems Paternal Aunt         x3    Kidney disease Maternal Uncle     BRCA 1/2 Neg Hx     Breast cancer Neg Hx     Endometrial cancer Neg Hx     Ovarian cancer Neg Hx     Malig Hyperthermia Neg Hx     Uterine cancer Neg Hx      Health Maintenance Due   Topic Date Due    DXA SCAN  Never done    ANNUAL WELLNESS VISIT  Never done      Immunization History   Administered Date(s) Administered    Flucelvax Quad Vial =>4yrs 10/01/2020     "Fluzone (or Fluarix & Flulaval for VFC) >6mos 01/04/2022    Pneumococcal Polysaccharide (PPSV23) 02/24/2021        Objective     Vitals:    09/27/23 1115 09/27/23 1117   BP: 160/97 150/100   BP Location: Left arm Right arm   Patient Position: Sitting Sitting   Cuff Size: Adult Adult   Pulse: 75    Temp: 98.2 °F (36.8 °C)    TempSrc: Oral    SpO2: 97%    Weight: 75.3 kg (166 lb)    Height: 157.5 cm (62.01\")      Body mass index is 30.35 kg/m².             Physical Exam  Vitals reviewed.   Constitutional:       General: She is not in acute distress.     Appearance: Normal appearance. She is well-developed.   HENT:      Head: Normocephalic and atraumatic.      Right Ear: Tympanic membrane normal. A middle ear effusion is present.      Left Ear: Tympanic membrane normal. A middle ear effusion is present.      Nose: Congestion present.      Right Sinus: Maxillary sinus tenderness present.      Left Sinus: Maxillary sinus tenderness present.      Mouth/Throat:      Mouth: Mucous membranes are moist.   Eyes:      Extraocular Movements: Extraocular movements intact.      Pupils: Pupils are equal, round, and reactive to light.   Cardiovascular:      Rate and Rhythm: Normal rate and regular rhythm.   Pulmonary:      Effort: Pulmonary effort is normal.      Breath sounds: Normal breath sounds.   Neurological:      Mental Status: She is alert and oriented to person, place, and time.   Psychiatric:         Mood and Affect: Mood and affect normal.         Result Review :                               Assessment and Plan      Diagnoses and all orders for this visit:    1. Sinusitis, unspecified chronicity, unspecified location (Primary)  Comments:  Rest, fluids.  Orders:  -     azithromycin (Zithromax Z-Mert) 250 MG tablet; Take 2 tablets by mouth on day 1, then 1 tablet daily on days 2-5  Dispense: 6 tablet; Refill: 0    2. Seasonal allergic rhinitis due to pollen  Comments:  Continue antihistamine and nasal steroid. Consider " singular.              Follow Up     Return for As needed for persistent or worsening symptoms, Next scheduled follow up.

## 2023-10-02 NOTE — PROGRESS NOTES
Marguerite Abarca  1960     Office/Outpatient Visit    Visit Date: Tue, Mar 10, 2020 09:23 am    Provider: Allison Hernandez N.P. (Assistant: Susan Mcdaniel MA)    Location: [facility]        Electronically signed by Allison Hernandez N.P. on  03/10/2020 10:54:38 AM                             Subjective:        CC: Mrs. Abarca is a 59 year old White female.  [facility];         HPI: Pt was started on a very low dose of levothyroxine. TSH was mildly elevated with a normal T4. At the time of testing, pt states she was feeling heart palpitations during stressful times at work. She states she had a lot going on in her life. She has been told by specialities that she should have never been started on medication. She would like to try to get off. She denies any anxiety, palpitations, or cold/heat intolerance. She just recently had bladder repair (tacked) in feb, 2020. She just saw urology and is waiting on urine culture. She states she has had some dysuria and incontinence. She will call them today for results. Medical, surgical, preventive, family and social hx reviewed.         ROS:     CONSTITUTIONAL:  Negative for fatigue, fever and night sweats.      EYES:  Negative for blurred vision.      E/N/T:  Negative for diminished hearing and nasal congestion.      CARDIOVASCULAR:  Negative for chest pain, palpitations and pedal edema.      RESPIRATORY:  Negative for recent cough and dyspnea.      GASTROINTESTINAL:  Negative for abdominal pain, constipation, diarrhea, nausea and vomiting.      GENITOURINARY:  Positive for dysuria and urinary incontinence.      MUSCULOSKELETAL:  Positive for arthralgias and joint stiffness.   Negative for myalgias.      INTEGUMENTARY/BREAST:  Negative for atypical mole(s) and rash.      NEUROLOGICAL:  Negative for dizziness, paresthesias and weakness.      PSYCHIATRIC:  Negative for anxiety, depression, sleep  disturbance and suicidal thoughts.          Past Medical History / Family History / Social History:         Last Reviewed on 3/10/2020 10:00 AM by Allison Hernandez    Past Medical History:                 PAST MEDICAL HISTORY         mild mitral regurgitation         GYNECOLOGICAL HISTORY:             CURRENT MEDICAL PROVIDERS:    Obstetrician/Gynecologist    Rheumatologist: Kylie         PREVENTIVE HEALTH MAINTENANCE             COLORECTAL CANCER SCREENING: colonoscopy with the following abnormalities noted-- few diverticula; The next colonoscopy is due   (Q 5 years d/t father hx of colon cancer)     MAMMOGRAM: was last done  with normal results     PAP SMEAR: was last done 2018 with normal results Dr. Lopez in [facility]         Surgical History:     Other Surgeries    Hysterectomy: WITH B.S.O.;;;     sinus surgery  per dr caballero;    Bladder repair (2020);     Cholecystectomy: 18;         Family History:     Father:  at age 90;  Arrhythmia; Congestive Heart Failure;  Colon Cancer     Mother:  at age 68; Cause of death was COPD         Social History:     Occupation: [employer] (will retire 2020)     Marital Status:      Children: None         Tobacco/Alcohol/Supplements:     Last Reviewed on 3/10/2020 10:00 AM by Allison Hernandez    Tobacco: She has never smoked.          Alcohol: Frequency: Socially     Caffeine:  She admits to consuming caffeine via coffee ( 1 serving per day ).          Substance Abuse History:     Last Reviewed on 3/10/2020 10:00 AM by Allison Hernandez    None         Mental Health History:     Last Reviewed on 3/10/2020 10:00 AM by Allison Hernandez    NEGATIVE         Communicable Diseases (eg STDs):     Last Reviewed on 9/15/2018 09:45 AM by Dora Alejandra        Immunizations:     None        Allergies:     Last Reviewed on 3/10/2020 09:58 AM by Allison Hernandez    No Known Allergies.        Current Medications:     Last Reviewed on  3/10/2020 09:58 AM by Allison Hernandez    Folic Acid 1 mg oral tablet [Take 1 tablet(s) by mouth daily]    Methotrexate Sodium 2.5 mg oral tablet [6 tabs once weekly]    Pantoprazole 40 mg oral tablet, delayed release (enteric coated) [1 tab daily PRN]    Vitamin D2         Objective:        Vitals:         Current: 3/10/2020 9:28:20 AM    Ht:  5 ft, 2.75 in;  Wt: 167.4 lbs;  BMI: 29.9T: 98.9 F (oral);  BP: 153/90 mm Hg (left arm, sitting);  P: 83 bpm (left arm (BP Cuff), sitting);  sCr: 0.87 mg/dL;  GFR: 69.70        Repeat:     10:37:57 AM  BP:   150/90mm Hg (left arm, sitting, HR: 86)     Exams:     PHYSICAL EXAM:     GENERAL: vital signs recorded - well developed, well nourished;  well groomed;  no apparent distress;     EYES: extraocular movements intact; conjunctiva and cornea are normal; PERRLA;     E/N/T: OROPHARYNX:  normal mucosa, dentition, gingiva, and posterior pharynx;     NECK: range of motion is normal; thyroid exam reveals not enlarged;     RESPIRATORY: normal respiratory rate and pattern with no distress; normal breath sounds with no rales, rhonchi, wheezes or rubs;     CARDIOVASCULAR: normal rate; rhythm is regular;  no systolic murmur; no edema;     GASTROINTESTINAL: nontender; normal bowel sounds;     LYMPHATIC: no enlargement of cervical or facial nodes;     MUSCULOSKELETAL: normal gait; normal overall tone     NEUROLOGIC: mental status: alert and oriented x 3;     PSYCHIATRIC:  appropriate affect and demeanor; normal speech pattern; grossly normal memory;         Assessment:         R03.0   Elevated blood-pressure reading, without diagnosis of hypertension       M06.89   (RA) Other specified rheumatoid arthritis, multiple sites       E02   Subclinical iodine-deficiency hypothyroidism       Z13.31   Encounter for screening for depression       R30.0   Dysuria           ORDERS:         Radiology/Test Orders:       3017F  Colorectal CA screen results documented and reviewed (PV)  (In-House)               Other Orders:         Depression screen negative  (In-House)              Screening mammogram results documented  (Send-Out)            1124F  Advance Care Planning discussed and doc in MR; no surrogate named or advance care plan provided  (Send-Out)                      Plan:         Elevated blood-pressure reading, without diagnosis of hypertensionPt states BP always reads high while in office. At home and in the hospital after bladder repair, she states it is always 120-130s/70-80s. RTC with any frequent headaches  or if BP is staying elevated at home. Pt v/u and had no further questions upon d/c.         (RA) Other specified rheumatoid arthritis, multiple sitesf/u with rheumatology as scheduled. RTC with any acute concerns. Pt v/u.         Subclinical iodine-deficiency hypothyroidism    Pt to rtc in 5 weeks for labs and then f/u appt to make sure pt does not start to experience palpitations again.  Pt to notify clinic prior to scheduled appt with any acute concerns or symptoms. Pt v/u and had no further questions upon d/c.          Encounter for screening for depression    MIPS PHQ-9 Depression Screening: Completed form scanned and in chart; Total Score 0; Negative Depression Screen           Orders:         Depression screen negative  (In-House)              Screening mammogram results documented  (Send-Out)            3017F  Colorectal CA screen results documented and reviewed (PV)  (In-House)            1124F  Advance Care Planning discussed and doc in MR; no surrogate named or advance care plan provided  (Send-Out)              Dysuriacall urology as discussed. Notify clinic with any issues or if unable to reach them. Pt v/u and had no further questions upon d/c.             Charge Capture:         Primary Diagnosis:     R03.0  Elevated blood-pressure reading, without diagnosis of hypertension           Orders:      87755  Office/outpatient visit; established patient, level 4   : Yes (In-House)              M06.89  (RA) Other specified rheumatoid arthritis, multiple sites     E02  Subclinical iodine-deficiency hypothyroidism     Z13.31  Encounter for screening for depression           Orders:        Depression screen negative  (In-House)            3017F  Colorectal CA screen results documented and reviewed (PV)  (In-House)              R30.0  Dysuria

## 2023-10-06 ENCOUNTER — TELEPHONE (OUTPATIENT)
Dept: FAMILY MEDICINE CLINIC | Age: 63
End: 2023-10-06
Payer: COMMERCIAL

## 2023-10-06 RX ORDER — AZITHROMYCIN 250 MG/1
TABLET, FILM COATED ORAL
Qty: 6 TABLET | Refills: 0 | Status: SHIPPED | OUTPATIENT
Start: 2023-10-06

## 2023-10-06 RX ORDER — BENZONATATE 100 MG/1
100 CAPSULE ORAL 3 TIMES DAILY PRN
Qty: 40 CAPSULE | Refills: 0 | Status: SHIPPED | OUTPATIENT
Start: 2023-10-06

## 2023-10-06 NOTE — TELEPHONE ENCOUNTER
Pt states she has completed the zpak and is feeling some better, but it is not all cleared up. She still has congestion and cough. She is leaving this Sunday for on a bus tour and she is requesting refill on zpak and something for cough(tessalon pearles). Would like to make sure she can take the zpak with her methotrexate and relafen. Please advise.

## 2023-11-08 ENCOUNTER — OFFICE VISIT (OUTPATIENT)
Dept: FAMILY MEDICINE CLINIC | Age: 63
End: 2023-11-08
Payer: MEDICARE

## 2023-11-08 VITALS
WEIGHT: 165.2 LBS | HEIGHT: 62 IN | OXYGEN SATURATION: 98 % | HEART RATE: 74 BPM | DIASTOLIC BLOOD PRESSURE: 82 MMHG | TEMPERATURE: 98.1 F | BODY MASS INDEX: 30.4 KG/M2 | SYSTOLIC BLOOD PRESSURE: 138 MMHG

## 2023-11-08 DIAGNOSIS — R03.0 ELEVATED BLOOD PRESSURE READING: ICD-10-CM

## 2023-11-08 DIAGNOSIS — Z00.00 WELCOME TO MEDICARE PREVENTIVE VISIT: Primary | ICD-10-CM

## 2023-11-08 PROCEDURE — G0402 INITIAL PREVENTIVE EXAM: HCPCS | Performed by: NURSE PRACTITIONER

## 2023-11-08 PROCEDURE — 1159F MED LIST DOCD IN RCRD: CPT | Performed by: NURSE PRACTITIONER

## 2023-11-08 PROCEDURE — 1160F RVW MEDS BY RX/DR IN RCRD: CPT | Performed by: NURSE PRACTITIONER

## 2023-11-08 PROCEDURE — 1170F FXNL STATUS ASSESSED: CPT | Performed by: NURSE PRACTITIONER

## 2023-11-08 RX ORDER — VALACYCLOVIR HYDROCHLORIDE 500 MG/1
500 TABLET, FILM COATED ORAL 2 TIMES DAILY PRN
Qty: 60 TABLET | Refills: 0 | Status: SHIPPED | OUTPATIENT
Start: 2023-11-08

## 2023-11-08 NOTE — PROGRESS NOTES
The ABCs of the Annual Wellness Visit  [facility]    Subjective     Marguerite Abarca is a 63 y.o. female who presents for a  Welcome to Medicare Visit.    The following portions of the patient's history were reviewed and   updated as appropriate: allergies, current medications, past family history, past medical history, past social history, past surgical history, and problem list.     Compared to one year ago, the patient feels her physical   health is the same.    Compared to one year ago, the patient feels her mental   health is the same.    Recent Hospitalizations:  She was not admitted to the hospital during the last year.       Current Medical Providers:  Patient Care Team:  Mackenzie Ash APRN as PCP - General (Nurse Practitioner)  Chava Espinal MD as Consulting Physician (Rheumatology)  Ynes Batista MD as Consulting Physician (Obstetrics and Gynecology)    Outpatient Medications Prior to Visit   Medication Sig Dispense Refill    benzonatate (Tessalon Perles) 100 MG capsule Take 1 capsule by mouth 3 (Three) Times a Day As Needed for Cough. 40 capsule 0    fluticasone (Flonase) 50 MCG/ACT nasal spray 2 sprays into the nostril(s) as directed by provider Daily. (Patient taking differently: 2 sprays into the nostril(s) as directed by provider As Needed.) 48 g 3    folic acid (FOLVITE) 1 MG tablet Take 1 tablet by mouth Daily.      hydroCHLOROthiazide (HYDRODIURIL) 12.5 MG tablet Take 1 tablet by mouth daily as needed for leg swelling 90 tablet 1    levocetirizine (XYZAL) 5 MG tablet Take 1 tablet by mouth Daily. (Patient taking differently: Take 1 tablet by mouth As Needed.) 90 tablet 3    levothyroxine (SYNTHROID, LEVOTHROID) 50 MCG tablet Take 1 tablet by mouth Daily.      methotrexate 2.5 MG tablet Take 6 tablets by mouth 1 (One) Time Per Week. Thursday TAKES 2.5 MG 6 TABS      nabumetone (RELAFEN) 750 MG tablet Take 1 tablet by mouth Daily. HOLD PRIOR TO SURGERY      pantoprazole  (PROTONIX) 40 MG EC tablet Take 1 tablet by mouth 2 (Two) Times a Day Before Meals. (Patient taking differently: Take 1 tablet by mouth Daily.) 60 tablet 2    vitamin D (ERGOCALCIFEROL) 76745 UNITS capsule capsule Take 1 capsule by mouth 1 (One) Time Per Week. THURSDAY      valACYclovir (VALTREX) 500 MG tablet Take 1 tablet by mouth 2 (Two) Times a Day As Needed.      azithromycin (Zithromax Z-Mert) 250 MG tablet Take 2 tablets by mouth on day 1, then 1 tablet daily on days 2-5 (Patient not taking: Reported on 11/8/2023) 6 tablet 0     No facility-administered medications prior to visit.       No opioid medication identified on active medication list. I have reviewed chart for other potential  high risk medication/s and harmful drug interactions in the elderly.        Aspirin is not on active medication list.  Aspirin use is not indicated based on review of current medical condition/s. Risk of harm outweighs potential benefits.  .    Patient Active Problem List   Diagnosis    GERD (gastroesophageal reflux disease)    Vitamin D deficiency    Uterine fibroid    Postmenopausal HRT (hormone replacement therapy)    Rheumatoid arthritis    Mixed stress and urge urinary incontinence    Cystocele with prolapse    Vaginal vault prolapse after hysterectomy    Radiculopathy, lumbar region    Hypothyroidism, unspecified    Diverticulosis    Herpes genitalis    Osteoporosis    Family history of colon cancer in father    History of hysterectomy    Age-related nuclear cataract of both eyes    Bilateral nonexudative age-related macular degeneration    Primary open angle glaucoma (POAG) of both eyes    Seasonal allergic rhinitis due to pollen     Advance Care Planning   Advance Care Planning     Advance Directive is not on file.  ACP discussion was held with the patient during this visit. Patient does not have an advance directive, declines further assistance.       Objective   Vitals:    11/08/23 1331 11/08/23 1340 11/08/23 1426  "  BP: (!) 176/103 179/84 138/82   BP Location: Right arm Right arm Right arm   Patient Position: Sitting Sitting Sitting   Cuff Size: Adult Adult Adult   Pulse: 69 74    Temp: 98.1 °F (36.7 °C)     TempSrc: Oral     SpO2: 98%     Weight: 74.9 kg (165 lb 3.2 oz)     Height: 157.5 cm (62.01\")       Estimated body mass index is 30.21 kg/m² as calculated from the following:    Height as of this encounter: 157.5 cm (62.01\").    Weight as of this encounter: 74.9 kg (165 lb 3.2 oz).           Does the patient have evidence of cognitive impairment?   No    Lab Results   Component Value Date    TRIG 80 2023    HDL 43 2023     (H) 2023    VLDL 15 2023       Procedures       HEALTH RISK ASSESSMENT    Smoking Status:  Social History     Tobacco Use   Smoking Status Never    Passive exposure: Past   Smokeless Tobacco Never     Alcohol Consumption:  Social History     Substance and Sexual Activity   Alcohol Use Not Currently    Comment: socially       Fall Risk Screen:    STEADI Fall Risk Assessment was completed, and patient is at LOW risk for falls.Assessment completed on:2023    Depression Screen:       2023     1:34 PM   PHQ-2/PHQ-9 Depression Screening   Little Interest or Pleasure in Doing Things 0-->not at all   Feeling Down, Depressed or Hopeless 0-->not at all   PHQ-9: Brief Depression Severity Measure Score 0       Health Habits and Functional and Cognitive Screenin/8/2023     1:34 PM   Functional & Cognitive Status   Do you have difficulty preparing food and eating? No   Do you have difficulty bathing yourself, getting dressed or grooming yourself? No   Do you have difficulty using the toilet? No   Do you have difficulty moving around from place to place? No   Do you have trouble with steps or getting out of a bed or a chair? No   Current Diet Well Balanced Diet   Dental Exam Up to date   Eye Exam Up to date   Exercise (times per week) 0 times per week   Current " Exercises Include No Regular Exercise   Do you need help using the phone?  No   Are you deaf or do you have serious difficulty hearing?  No   Do you need help to go to places out of walking distance? No   Do you need help shopping? No   Do you need help preparing meals?  No   Do you need help with housework?  No   Do you need help with laundry? No   Do you need help taking your medications? No   Do you need help managing money? No   Do you ever drive or ride in a car without wearing a seat belt? No   Have you felt unusual stress, anger or loneliness in the last month? No   Who do you live with? Spouse   If you need help, do you have trouble finding someone available to you? No   Have you been bothered in the last four weeks by sexual problems? No   Do you have difficulty concentrating, remembering or making decisions? No       Visual Acuity:    Vision Screening    Right eye Left eye Both eyes   Without correction      With correction 20/70 20/70 20/70       Age-appropriate Screening Schedule:  Refer to the list below for future screening recommendations based on patient's age, sex and/or medical conditions. Orders for these recommended tests are listed in the plan section. The patient has been provided with a written plan.    Health Maintenance   Topic Date Due    DXA SCAN  Never done    ZOSTER VACCINE (1 of 2) 11/08/2023 (Originally 9/1/2008)    COVID-19 Vaccine (1) 11/10/2023 (Originally 3/1/1961)    TDAP/TD VACCINES (1 - Tdap) 02/20/2024 (Originally 9/1/1977)    INFLUENZA VACCINE  03/31/2024 (Originally 8/1/2023)    Pneumococcal Vaccine 65+ (2 - PCV) 09/07/2024 (Originally 9/1/2023)    MAMMOGRAM  09/06/2024    BMI FOLLOWUP  09/07/2024    ANNUAL WELLNESS VISIT  11/08/2024    COLORECTAL CANCER SCREENING  03/14/2027    HEPATITIS C SCREENING  Completed    PAP SMEAR  Discontinued        CMS Preventative Services Quick Reference  Risk Factors Identified During Encounter    Glaucoma or Family History of Glaucoma:    "Following with optometry and opthalmologist  Immunizations Discussed/Encouraged: Tdap, Influenza, Prevnar 20 (Pneumococcal 20-valent conjugate), Shingrix, and COVID19  Vision Screening Recommended  The above risks/problems have been discussed with the patient.  Pertinent information has been shared with the patient in the After Visit Summary.    Follow Up:   Initial Medicare Visit in one year    An After Visit Summary and PPPS were made available to the patient.           Chief Complaint  Welcome To Medicare    Subjective            Objective   Vital Signs:  /82 (BP Location: Right arm, Patient Position: Sitting, Cuff Size: Adult)   Pulse 74   Temp 98.1 °F (36.7 °C) (Oral)   Ht 157.5 cm (62.01\")   Wt 74.9 kg (165 lb 3.2 oz)   SpO2 98%   BMI 30.21 kg/m²     Physical Exam  Vitals reviewed.   Constitutional:       General: She is not in acute distress.     Appearance: Normal appearance. She is well-developed.   HENT:      Head: Normocephalic and atraumatic.   Cardiovascular:      Rate and Rhythm: Normal rate and regular rhythm.   Pulmonary:      Effort: Pulmonary effort is normal.      Breath sounds: Normal breath sounds.   Neurological:      Mental Status: She is alert and oriented to person, place, and time.   Psychiatric:         Mood and Affect: Mood and affect normal.                      Assessment and Plan   Diagnoses and all orders for this visit:    1. Welcome to Medicare preventive visit (Primary)  Comments:  Appropriate screenings and vaccinations were reviewed with the pt and offered as indicated.  Pt counseled on healthy lifestyle including healthy diet, exercise.    2. Elevated blood pressure reading  Comments:  Likely due in part to decongestant use. BP normal at home. Will continue to monitor.    Other orders  -     valACYclovir (VALTREX) 500 MG tablet; Take 1 tablet by mouth 2 (Two) Times a Day As Needed (cold sore).  Dispense: 60 tablet; Refill: 0             Follow Up   Return in about 6 " months (around 5/8/2024) for Recheck.  Patient was given instructions and counseling regarding her condition or for health maintenance advice. Please see specific information pulled into the AVS if appropriate.

## 2023-11-13 RX ORDER — LEVOTHYROXINE SODIUM 0.07 MG/1
75 TABLET ORAL DAILY
Qty: 90 TABLET | Refills: 1 | Status: SHIPPED | OUTPATIENT
Start: 2023-11-13

## 2023-12-04 ENCOUNTER — TELEPHONE (OUTPATIENT)
Dept: OBSTETRICS AND GYNECOLOGY | Age: 63
End: 2023-12-04
Payer: COMMERCIAL

## 2023-12-04 NOTE — TELEPHONE ENCOUNTER
Pt calling stating she thinks she is starting to get a UTI. Pt c/o urinary burning & discomfort. Pt asking if Rx can be sent to pharmacy. Pt pharmacy verified & on file. Please advise if Rx can be sent or if pt needs to make an appt for UA.

## 2023-12-05 RX ORDER — NITROFURANTOIN 25; 75 MG/1; MG/1
100 CAPSULE ORAL 2 TIMES DAILY
Qty: 14 CAPSULE | Refills: 0 | Status: SHIPPED | OUTPATIENT
Start: 2023-12-05

## 2024-01-05 NOTE — PROGRESS NOTES
SURGERY  Marguerite Abarca   1960  01/10/24    Chief Complaint:  epigastric pain    HPI    Ms. Abarca is a very nice 63 y.o. female who is here with a stomach problem.  She had her gallbladder removed by Dr Rushing for biliary dyskinesia in 2018.  She now presents with pain of 2 hours duration, greater than one month, associated with nausea, diarrhea and belching.  Nothing aggravates it but moving about and passing flatus improves.      She was on relafen for her arthritis and started nexium (but it had magnesium in it and made her nausea worse).  She thus started prilosec and she is better.  She stopped the nabumetone.   Her RA is manageable unless she eats sweets or before rain.    Father with colon cancer, none in the stomach.  She had her last c scope in 2022 with a ?history of colon polyps.      She thinks she has an allergy to milk which explains her blisters she had around the anus.      Past Medical History:   Diagnosis Date    Disease of thyroid gland     GERD (gastroesophageal reflux disease)     Herpes genitalis 2018    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 Lesion in the right interlabial sulcus. Min symptoms, resolved in couple days.  No lymphadenopathy.  Probable recurrence of old infection, discussed IgG and IgM testing to determine if recent infection.    Osteoporosis     Rheumatoid arthritis     Dr Chava Espinal; Methotrexate,  Decreased relafen 12/1/2017.    Vitamin D deficiency      Past Surgical History:   Procedure Laterality Date    ANTERIOR AND POSTERIOR VAGINAL REPAIR N/A 8/7/2018    Procedure: POSTERIOR VAGINAL REPAIR MID URETHRAL SLING CYSTOSCOPY;  Surgeon: David Pelayo MD;  Location: [facility] OR;  Service: Gynecology    CHOLECYSTECTOMY WITH INTRAOPERATIVE CHOLANGIOGRAM N/A 2/16/2018    Procedure: CHOLECYSTECTOMY LAPAROSCOPIC ;  Surgeon: Silvano Rushing MD;  Location: [facility];  Service:     COLONOSCOPY N/A 8/25/2016    Diverticulosis in the sigmoid colon, The examination was otherwise  normal on direct and retoflexion views    COLONOSCOPY N/A 2010    Diverticulosis sigmoid colon, otherwise normal-Dr. Arnold Mcneal    COLONOSCOPY N/A 2007    One 7 mm polyp in the distal sigmoid colon-Dr. Arnold Mcneal    COLONOSCOPY N/A 3/14/2022    Procedure: COLONOSCOPY TO CECUM;  Surgeon: Kay Miranda MD;  Location: [facility] ENDOSCOPY;  Service: General;  Laterality: N/A;  PRE- FAMILY HX COLON CANCER  POST- DIVERTICULOSIS    CYST REMOVAL Bilateral     Both wrists, benign.  Sounds like ganglion cyst.    DIAGNOSTIC LAPAROSCOPY      Exploratory Laparoscopy with myomectomy (twice) and cautery of endometriosis. The ovaries were preserved.    ENDOSCOPY N/A 2017    Procedure: ESOPHAGOGASTRODUODENOSCOPY with biopsy; no H. pylori on biopsy.  Surgeon: Ignacio Rodriguez MD;  Location: [facility] ENDOSCOPY;  Service:     EXCISION TUMOR NECK / THORAX       Removal of Tumor from her back that was Benign, Lumbo-Sacral area.     HEMORRHOIDECTOMY N/A 2010    w/ fissurectomy-Dr. Arnold Mcneal    MYOMECTOMY      Exploratory Laparoscopy with myomectomy (twice) and cautery of endometriosis. The ovaries were preserved.    SACROSPINUS SUSPENSION N/A 2020    Procedure: SACROSPINUS LIGAMENT SUSPENSION REPAIR CYSTOSCOPY;  Surgeon: David Pelayo MD;  Location: [facility] MAIN OR;  Service: Gynecology;  Laterality: N/A;    SINUS SURGERY      TOTAL ABDOMINAL HYSTERECTOMY WITH SALPINGO OOPHORECTOMY Bilateral     Total Abdominal Hysterectomy with Removal of Both Tubes and Ovaries. for chronic pelvic pain and adhesions.    VOCAL CORD BIOPSY Right 2003    Microlaryngoscopy, biopsy of right true cord-Dr. Vamshi Barron     Family History   Problem Relation Age of Onset    Valvular heart disease Father          of CHF.    Colon cancer Father     Cancer Father         Colon    Colon polyps Sister     COPD Mother     No Known Problems Brother     No Known Problems Maternal Grandmother     No  Known Problems Maternal Grandfather     No Known Problems Paternal Grandmother     No Known Problems Paternal Grandfather     No Known Problems Maternal Aunt         x3    No Known Problems Paternal Aunt         x3    Kidney disease Maternal Uncle     BRCA 1/2 Neg Hx     Breast cancer Neg Hx     Endometrial cancer Neg Hx     Ovarian cancer Neg Hx     Malig Hyperthermia Neg Hx     Uterine cancer Neg Hx      Social History     Socioeconomic History    Marital status:      Spouse name: LETITIA    Number of children: 0   Tobacco Use    Smoking status: Never     Passive exposure: Past    Smokeless tobacco: Never   Vaping Use    Vaping Use: Never used   Substance and Sexual Activity    Alcohol use: Not Currently     Comment: socially    Drug use: No    Sexual activity: Yes     Partners: Male     Birth control/protection: Surgical     Comment: spouse = LETITIA          Current Outpatient Medications:     benzonatate (Tessalon Perles) 100 MG capsule, Take 1 capsule by mouth 3 (Three) Times a Day As Needed for Cough., Disp: 40 capsule, Rfl: 0    fluticasone (Flonase) 50 MCG/ACT nasal spray, 2 sprays into the nostril(s) as directed by provider As Needed for Rhinitis., Disp: , Rfl:     folic acid (FOLVITE) 1 MG tablet, Take 1 tablet by mouth Daily., Disp: , Rfl:     hydroCHLOROthiazide (HYDRODIURIL) 12.5 MG tablet, Take 1 tablet by mouth daily as needed for leg swelling, Disp: 90 tablet, Rfl: 1    latanoprost (XALATAN) 0.005 % ophthalmic solution, 1 drop Every Night., Disp: , Rfl:     levocetirizine (XYZAL) 5 MG tablet, Take 1 tablet by mouth As Needed for Allergies (itching)., Disp: , Rfl:     levothyroxine (SYNTHROID, LEVOTHROID) 50 MCG tablet, , Disp: , Rfl:     methotrexate 2.5 MG tablet, Take 6 tablets by mouth 1 (One) Time Per Week. Thursday TAKES 2.5 MG 6 TABS, Disp: , Rfl:     omeprazole (priLOSEC) 20 MG capsule, Take 1 capsule by mouth Daily., Disp: , Rfl:     pantoprazole (PROTONIX) 40 MG EC tablet, Take 1 tablet by  "mouth 2 (Two) Times a Day Before Meals., Disp: 60 tablet, Rfl: 2    valACYclovir (VALTREX) 500 MG tablet, Take 1 tablet by mouth 2 (Two) Times a Day As Needed (cold sore)., Disp: 60 tablet, Rfl: 0    vitamin D (ERGOCALCIFEROL) 06396 UNITS capsule capsule, Take 1 capsule by mouth 1 (One) Time Per Week. THURSDAY, Disp: , Rfl:     Allergies   Allergen Reactions    Nsaids Unknown - Low Severity     States cannot take nsaids due to home med interaction       PHYSICAL EXAM:    /90   Pulse 77   Ht 157.5 cm (62.01\")   Wt 76.5 kg (168 lb 9.6 oz)   LMP  (LMP Unknown)   SpO2 97%   BMI 30.83 kg/m²   Body mass index is 30.83 kg/m².      Constitutional: well developed, well nourished, appears  healthy, stated age  ENMT: Hearing intact, neck without masses  CVS: RRR, no murmur  Respiratory: CTA, normal respiratory effort   Gastrointestinal: abdomen nontender now, but tender deeply at epigastrium earlier today, abdominal hernia not detected  Musculoskeletal: gait normal, muscle mass normal  Neurological: awake and alert, seems to have reasonable capacity for understanding for medical decision making  Psychiatric: appears to have reasonable judgement, pleasant    Radiographic/Lab Findings:   Hgb12.3, 9/2023,     IMPRESSION:  Epigastric pain suggestive of ulcer disease, S/P cholecystectomy  History of relafan use, now stopped.  Prilosec use, with improvement  History of colon polyps?.  C scope not needed until at least 2027.    Father with colon cancer.  osteoporosis    PLAN:  EGD  C scope 2027.  Continue protonix and omeprazole use until symptoms resolve.  With DC of relafen, may be able to stop prilosec at least.  Continuation of PPI will increase risk of osteoporosis.  She states she must take protonix.      Kay Miranda MD  12:40 EST      In order to provide a more personal and interactive patient experience as well as improve efficiency, this note was started prior to the office visit, including review of past " history and pertinent images, surgeries.

## 2024-01-05 NOTE — H&P (VIEW-ONLY)
SURGERY  Marguerite Abarca   1960  01/10/24    Chief Complaint:  epigastric pain    HPI    Ms. Abarca is a very nice 63 y.o. female who is here with a stomach problem.  She had her gallbladder removed by Dr Rushing for biliary dyskinesia in 2018.  She now presents with pain of 2 hours duration, greater than one month, associated with nausea, diarrhea and belching.  Nothing aggravates it but moving about and passing flatus improves.      She was on relafen for her arthritis and started nexium (but it had magnesium in it and made her nausea worse).  She thus started prilosec and she is better.  She stopped the nabumetone.   Her RA is manageable unless she eats sweets or before rain.    Father with colon cancer, none in the stomach.  She had her last c scope in 2022 with a ?history of colon polyps.      She thinks she has an allergy to milk which explains her blisters she had around the anus.      Past Medical History:   Diagnosis Date    Disease of thyroid gland     GERD (gastroesophageal reflux disease)     Herpes genitalis 2018    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 Lesion in the right interlabial sulcus. Min symptoms, resolved in couple days.  No lymphadenopathy.  Probable recurrence of old infection, discussed IgG and IgM testing to determine if recent infection.    Osteoporosis     Rheumatoid arthritis     Dr Chava Espinal; Methotrexate,  Decreased relafen 12/1/2017.    Vitamin D deficiency      Past Surgical History:   Procedure Laterality Date    ANTERIOR AND POSTERIOR VAGINAL REPAIR N/A 8/7/2018    Procedure: POSTERIOR VAGINAL REPAIR MID URETHRAL SLING CYSTOSCOPY;  Surgeon: David Pealyo MD;  Location: [facility] OR;  Service: Gynecology    CHOLECYSTECTOMY WITH INTRAOPERATIVE CHOLANGIOGRAM N/A 2/16/2018    Procedure: CHOLECYSTECTOMY LAPAROSCOPIC ;  Surgeon: Silvano Rushing MD;  Location: [facility];  Service:     COLONOSCOPY N/A 8/25/2016    Diverticulosis in the sigmoid colon, The examination was otherwise  normal on direct and retoflexion views    COLONOSCOPY N/A 2010    Diverticulosis sigmoid colon, otherwise normal-Dr. Arnold Mcneal    COLONOSCOPY N/A 2007    One 7 mm polyp in the distal sigmoid colon-Dr. Arnold Mcneal    COLONOSCOPY N/A 3/14/2022    Procedure: COLONOSCOPY TO CECUM;  Surgeon: Kay Miranda MD;  Location: [facility] ENDOSCOPY;  Service: General;  Laterality: N/A;  PRE- FAMILY HX COLON CANCER  POST- DIVERTICULOSIS    CYST REMOVAL Bilateral     Both wrists, benign.  Sounds like ganglion cyst.    DIAGNOSTIC LAPAROSCOPY      Exploratory Laparoscopy with myomectomy (twice) and cautery of endometriosis. The ovaries were preserved.    ENDOSCOPY N/A 2017    Procedure: ESOPHAGOGASTRODUODENOSCOPY with biopsy; no H. pylori on biopsy.  Surgeon: Ignacio Rodriguez MD;  Location: [facility] ENDOSCOPY;  Service:     EXCISION TUMOR NECK / THORAX       Removal of Tumor from her back that was Benign, Lumbo-Sacral area.     HEMORRHOIDECTOMY N/A 2010    w/ fissurectomy-Dr. Arnold Mcneal    MYOMECTOMY      Exploratory Laparoscopy with myomectomy (twice) and cautery of endometriosis. The ovaries were preserved.    SACROSPINUS SUSPENSION N/A 2020    Procedure: SACROSPINUS LIGAMENT SUSPENSION REPAIR CYSTOSCOPY;  Surgeon: David Pelayo MD;  Location: [facility] MAIN OR;  Service: Gynecology;  Laterality: N/A;    SINUS SURGERY      TOTAL ABDOMINAL HYSTERECTOMY WITH SALPINGO OOPHORECTOMY Bilateral     Total Abdominal Hysterectomy with Removal of Both Tubes and Ovaries. for chronic pelvic pain and adhesions.    VOCAL CORD BIOPSY Right 2003    Microlaryngoscopy, biopsy of right true cord-Dr. Vamshi Barron     Family History   Problem Relation Age of Onset    Valvular heart disease Father          of CHF.    Colon cancer Father     Cancer Father         Colon    Colon polyps Sister     COPD Mother     No Known Problems Brother     No Known Problems Maternal Grandmother     No  Known Problems Maternal Grandfather     No Known Problems Paternal Grandmother     No Known Problems Paternal Grandfather     No Known Problems Maternal Aunt         x3    No Known Problems Paternal Aunt         x3    Kidney disease Maternal Uncle     BRCA 1/2 Neg Hx     Breast cancer Neg Hx     Endometrial cancer Neg Hx     Ovarian cancer Neg Hx     Malig Hyperthermia Neg Hx     Uterine cancer Neg Hx      Social History     Socioeconomic History    Marital status:      Spouse name: LETITIA    Number of children: 0   Tobacco Use    Smoking status: Never     Passive exposure: Past    Smokeless tobacco: Never   Vaping Use    Vaping Use: Never used   Substance and Sexual Activity    Alcohol use: Not Currently     Comment: socially    Drug use: No    Sexual activity: Yes     Partners: Male     Birth control/protection: Surgical     Comment: spouse = LETITIA          Current Outpatient Medications:     benzonatate (Tessalon Perles) 100 MG capsule, Take 1 capsule by mouth 3 (Three) Times a Day As Needed for Cough., Disp: 40 capsule, Rfl: 0    fluticasone (Flonase) 50 MCG/ACT nasal spray, 2 sprays into the nostril(s) as directed by provider As Needed for Rhinitis., Disp: , Rfl:     folic acid (FOLVITE) 1 MG tablet, Take 1 tablet by mouth Daily., Disp: , Rfl:     hydroCHLOROthiazide (HYDRODIURIL) 12.5 MG tablet, Take 1 tablet by mouth daily as needed for leg swelling, Disp: 90 tablet, Rfl: 1    latanoprost (XALATAN) 0.005 % ophthalmic solution, 1 drop Every Night., Disp: , Rfl:     levocetirizine (XYZAL) 5 MG tablet, Take 1 tablet by mouth As Needed for Allergies (itching)., Disp: , Rfl:     levothyroxine (SYNTHROID, LEVOTHROID) 50 MCG tablet, , Disp: , Rfl:     methotrexate 2.5 MG tablet, Take 6 tablets by mouth 1 (One) Time Per Week. Thursday TAKES 2.5 MG 6 TABS, Disp: , Rfl:     omeprazole (priLOSEC) 20 MG capsule, Take 1 capsule by mouth Daily., Disp: , Rfl:     pantoprazole (PROTONIX) 40 MG EC tablet, Take 1 tablet by  "mouth 2 (Two) Times a Day Before Meals., Disp: 60 tablet, Rfl: 2    valACYclovir (VALTREX) 500 MG tablet, Take 1 tablet by mouth 2 (Two) Times a Day As Needed (cold sore)., Disp: 60 tablet, Rfl: 0    vitamin D (ERGOCALCIFEROL) 44194 UNITS capsule capsule, Take 1 capsule by mouth 1 (One) Time Per Week. THURSDAY, Disp: , Rfl:     Allergies   Allergen Reactions    Nsaids Unknown - Low Severity     States cannot take nsaids due to home med interaction       PHYSICAL EXAM:    /90   Pulse 77   Ht 157.5 cm (62.01\")   Wt 76.5 kg (168 lb 9.6 oz)   LMP  (LMP Unknown)   SpO2 97%   BMI 30.83 kg/m²   Body mass index is 30.83 kg/m².      Constitutional: well developed, well nourished, appears  healthy, stated age  ENMT: Hearing intact, neck without masses  CVS: RRR, no murmur  Respiratory: CTA, normal respiratory effort   Gastrointestinal: abdomen nontender now, but tender deeply at epigastrium earlier today, abdominal hernia not detected  Musculoskeletal: gait normal, muscle mass normal  Neurological: awake and alert, seems to have reasonable capacity for understanding for medical decision making  Psychiatric: appears to have reasonable judgement, pleasant    Radiographic/Lab Findings:   Hgb12.3, 9/2023,     IMPRESSION:  Epigastric pain suggestive of ulcer disease, S/P cholecystectomy  History of relafan use, now stopped.  Prilosec use, with improvement  History of colon polyps?.  C scope not needed until at least 2027.    Father with colon cancer.  osteoporosis    PLAN:  EGD  C scope 2027.  Continue protonix and omeprazole use until symptoms resolve.  With DC of relafen, may be able to stop prilosec at least.  Continuation of PPI will increase risk of osteoporosis.  She states she must take protonix.      Kay Miranda MD  12:40 EST      In order to provide a more personal and interactive patient experience as well as improve efficiency, this note was started prior to the office visit, including review of past " history and pertinent images, surgeries.

## 2024-01-10 ENCOUNTER — OFFICE VISIT (OUTPATIENT)
Dept: SURGERY | Facility: CLINIC | Age: 64
End: 2024-01-10
Payer: MEDICARE

## 2024-01-10 ENCOUNTER — PREP FOR SURGERY (OUTPATIENT)
Dept: OTHER | Facility: HOSPITAL | Age: 64
End: 2024-01-10
Payer: COMMERCIAL

## 2024-01-10 VITALS
WEIGHT: 168.6 LBS | OXYGEN SATURATION: 97 % | HEART RATE: 77 BPM | DIASTOLIC BLOOD PRESSURE: 90 MMHG | HEIGHT: 62 IN | BODY MASS INDEX: 31.03 KG/M2 | SYSTOLIC BLOOD PRESSURE: 140 MMHG

## 2024-01-10 DIAGNOSIS — R10.13 EPIGASTRIC PAIN: Primary | ICD-10-CM

## 2024-01-10 DIAGNOSIS — J30.1 SEASONAL ALLERGIC RHINITIS DUE TO POLLEN: ICD-10-CM

## 2024-01-10 PROCEDURE — 1160F RVW MEDS BY RX/DR IN RCRD: CPT | Performed by: SURGERY

## 2024-01-10 PROCEDURE — 1159F MED LIST DOCD IN RCRD: CPT | Performed by: SURGERY

## 2024-01-10 PROCEDURE — 99214 OFFICE O/P EST MOD 30 MIN: CPT | Performed by: SURGERY

## 2024-01-10 RX ORDER — FLUTICASONE PROPIONATE 50 MCG
2 SPRAY, SUSPENSION (ML) NASAL AS NEEDED
Status: SHIPPED
Start: 2024-01-10

## 2024-01-10 RX ORDER — OMEPRAZOLE 20 MG/1
20 CAPSULE, DELAYED RELEASE ORAL DAILY
COMMUNITY

## 2024-01-10 RX ORDER — LATANOPROST 50 UG/ML
1 SOLUTION/ DROPS OPHTHALMIC NIGHTLY
COMMUNITY

## 2024-01-10 RX ORDER — LEVOCETIRIZINE DIHYDROCHLORIDE 5 MG/1
5 TABLET, FILM COATED ORAL AS NEEDED
Status: SHIPPED
Start: 2024-01-10

## 2024-01-10 RX ORDER — LEVOTHYROXINE SODIUM 0.05 MG/1
TABLET ORAL
COMMUNITY
Start: 2023-11-16

## 2024-01-24 ENCOUNTER — TELEPHONE (OUTPATIENT)
Dept: SURGERY | Facility: CLINIC | Age: 64
End: 2024-01-24
Payer: COMMERCIAL

## 2024-01-24 NOTE — TELEPHONE ENCOUNTER
Voicemail left with updated arrival time of 6:30 am for procedure on 1/29/21. Requested return call to confirm, [employer] message also sent.    Patient returned call and confirmed updated arrival time of 6:30.   Detail Level: Simple Instructions: This plan will send the code FBSE to the PM system.  DO NOT or CHANGE the price. Price (Do Not Change): 0.00

## 2024-01-29 ENCOUNTER — ANESTHESIA EVENT (OUTPATIENT)
Dept: GASTROENTEROLOGY | Facility: HOSPITAL | Age: 64
End: 2024-01-29
Payer: MEDICARE

## 2024-01-29 ENCOUNTER — HOSPITAL ENCOUNTER (OUTPATIENT)
Facility: HOSPITAL | Age: 64
Setting detail: HOSPITAL OUTPATIENT SURGERY
Discharge: HOME | End: 2024-01-29
Attending: SURGERY | Admitting: SURGERY
Payer: MEDICARE

## 2024-01-29 ENCOUNTER — ANESTHESIA (OUTPATIENT)
Dept: GASTROENTEROLOGY | Facility: HOSPITAL | Age: 64
End: 2024-01-29
Payer: MEDICARE

## 2024-01-29 VITALS
RESPIRATION RATE: 16 BRPM | DIASTOLIC BLOOD PRESSURE: 90 MMHG | SYSTOLIC BLOOD PRESSURE: 172 MMHG | TEMPERATURE: 98 F | OXYGEN SATURATION: 100 % | HEIGHT: 62 IN | WEIGHT: 166 LBS | HEART RATE: 75 BPM | BODY MASS INDEX: 30.55 KG/M2

## 2024-01-29 DIAGNOSIS — R10.13 EPIGASTRIC PAIN: ICD-10-CM

## 2024-01-29 PROCEDURE — 43239 EGD BIOPSY SINGLE/MULTIPLE: CPT | Performed by: SURGERY

## 2024-01-29 PROCEDURE — 25010000002 PROPOFOL 10 MG/ML EMULSION: Performed by: NURSE ANESTHETIST, CERTIFIED REGISTERED

## 2024-01-29 PROCEDURE — 25810000003 LACTATED RINGERS PER 1000 ML: Performed by: SURGERY

## 2024-01-29 PROCEDURE — 25010000002 GLYCOPYRROLATE 0.2 MG/ML SOLUTION: Performed by: NURSE ANESTHETIST, CERTIFIED REGISTERED

## 2024-01-29 PROCEDURE — 88305 TISSUE EXAM BY PATHOLOGIST: CPT | Performed by: SURGERY

## 2024-01-29 RX ORDER — LIDOCAINE HYDROCHLORIDE 20 MG/ML
INJECTION, SOLUTION INFILTRATION; PERINEURAL AS NEEDED
Status: DISCONTINUED | OUTPATIENT
Start: 2024-01-29 | End: 2024-01-29 | Stop reason: SURG

## 2024-01-29 RX ORDER — PROPOFOL 10 MG/ML
VIAL (ML) INTRAVENOUS AS NEEDED
Status: DISCONTINUED | OUTPATIENT
Start: 2024-01-29 | End: 2024-01-29 | Stop reason: SURG

## 2024-01-29 RX ORDER — SODIUM CHLORIDE, SODIUM LACTATE, POTASSIUM CHLORIDE, CALCIUM CHLORIDE 600; 310; 30; 20 MG/100ML; MG/100ML; MG/100ML; MG/100ML
1000 INJECTION, SOLUTION INTRAVENOUS CONTINUOUS
Status: DISCONTINUED | OUTPATIENT
Start: 2024-01-29 | End: 2024-01-29 | Stop reason: HOSPADM

## 2024-01-29 RX ORDER — GLYCOPYRROLATE 0.2 MG/ML
INJECTION INTRAMUSCULAR; INTRAVENOUS AS NEEDED
Status: DISCONTINUED | OUTPATIENT
Start: 2024-01-29 | End: 2024-01-29 | Stop reason: SURG

## 2024-01-29 RX ORDER — LIDOCAINE HYDROCHLORIDE 10 MG/ML
0.5 INJECTION, SOLUTION INFILTRATION; PERINEURAL ONCE AS NEEDED
Status: DISCONTINUED | OUTPATIENT
Start: 2024-01-29 | End: 2024-01-29 | Stop reason: HOSPADM

## 2024-01-29 RX ORDER — SODIUM CHLORIDE 0.9 % (FLUSH) 0.9 %
10 SYRINGE (ML) INJECTION AS NEEDED
Status: DISCONTINUED | OUTPATIENT
Start: 2024-01-29 | End: 2024-01-29 | Stop reason: HOSPADM

## 2024-01-29 RX ADMIN — GLYCOPYRROLATE 0.2 MG: 0.2 INJECTION INTRAMUSCULAR; INTRAVENOUS at 08:12

## 2024-01-29 RX ADMIN — PROPOFOL 80 MG: 10 INJECTION, EMULSION INTRAVENOUS at 08:13

## 2024-01-29 RX ADMIN — SODIUM CHLORIDE, POTASSIUM CHLORIDE, SODIUM LACTATE AND CALCIUM CHLORIDE 1000 ML: 600; 310; 30; 20 INJECTION, SOLUTION INTRAVENOUS at 07:37

## 2024-01-29 RX ADMIN — LIDOCAINE HYDROCHLORIDE 60 MG: 20 INJECTION, SOLUTION INFILTRATION; PERINEURAL at 08:13

## 2024-01-29 NOTE — ANESTHESIA PREPROCEDURE EVALUATION
Anesthesia Evaluation     Patient summary reviewed and Nursing notes reviewed                Airway   Mallampati: II  TM distance: >3 FB  Neck ROM: full  Dental      Pulmonary - negative pulmonary ROS   Cardiovascular - negative cardio ROS    ECG reviewed        Neuro/Psych  (+) numbness  GI/Hepatic/Renal/Endo    (+) obesity, GERD, thyroid problem hypothyroidism    Musculoskeletal     Abdominal    Substance History - negative use     OB/GYN negative ob/gyn ROS         Other   arthritis,                   Anesthesia Plan    ASA 2     MAC     intravenous induction     Anesthetic plan, risks, benefits, and alternatives have been provided, discussed and informed consent has been obtained with: patient.    Plan discussed with CRNA.    CODE STATUS:

## 2024-01-29 NOTE — OP NOTE
EGD Procedure Note  Marguerite Abarca  YOB: 1960    Procedure Date: 01/29/24    Pre-operative Diagnosis:   Epigastric pain suggestive of ulcer disease, S/P cholecystectomy  History of relafan use, now stopped.  Prilosec use, with improvement, now stopped.  Protonix continues  History of colon polyps?.  C scope not needed until at least 2027.    Father with colon cancer.  osteoporosis    Post-operative Diagnosis:  3 cm hiatal hernia. (33-36) without evidence of esophagitis.     Procedure:  EGD with antral biopsy to rule out helicobacter    Surgeon: Ruth    Anesthetic: MAC per Taz Escudero MD    Findings/Treatments:  Nothing to clearly identify source of epigastric pain, which is much better with relafen DC    Recommendations:  Don't use NSAIDS or if used, then must be accompanied by PPI (prilosec or protonix)  Use of PPI increases risk of osteoporosis  3.  Give patient a copy of the procedural photographs    Technique:    MAC anesthesia was induced.  Bite block was placed and the gastroscope was inserted thru such and advanced under direct vision to the second portion of the duodenum.  A careful inspection was made as the scope was withdrawn and photographs taken, including a retroflexed view of the stomach.   Findings and interventions are described above.  Biopsies were  done with the cold biopsy forceps.      Air was removed at the end of the procedure    Kay Miranda MD  01/29/24  08:24 EST

## 2024-01-29 NOTE — DISCHARGE INSTRUCTIONS
For the next 24 hours patient needs to be with a responsible adult.    For 24 hours DO NOT drive, operate machinery, appliances, drink alcohol, make important decisions or sign legal documents.    Start with a light or bland diet if you are feeling sick to your stomach otherwise advance to regular diet as tolerated.    Follow recommendations on procedure report if provided by your doctor.    Call Dr Miranda for problems [phone]    Problems may include but not limited to: large amounts of bleeding, trouble breathing, repeated vomiting, severe unrelieved pain, fever or chills.

## 2024-01-30 LAB
LAB AP CASE REPORT: NORMAL
PATH REPORT.FINAL DX SPEC: NORMAL
PATH REPORT.GROSS SPEC: NORMAL

## 2024-02-01 RX ORDER — LEVOTHYROXINE SODIUM 0.05 MG/1
50 TABLET ORAL DAILY
Qty: 90 TABLET | Refills: 0 | Status: SHIPPED | OUTPATIENT
Start: 2024-02-01

## 2024-03-05 DIAGNOSIS — U07.1 COVID: ICD-10-CM

## 2024-03-06 RX ORDER — CHLORCYCLIZINE HYDROCHLORIDE AND PSEUDOEPHEDRINE HYDROCHLORIDE 25; 60 MG/1; MG/1
TABLET ORAL
Qty: 30 TABLET | Refills: 0 | Status: SHIPPED | OUTPATIENT
Start: 2024-03-06

## 2024-05-08 ENCOUNTER — OFFICE VISIT (OUTPATIENT)
Dept: FAMILY MEDICINE CLINIC | Age: 64
End: 2024-05-08
Payer: MEDICARE

## 2024-05-08 ENCOUNTER — TELEPHONE (OUTPATIENT)
Dept: FAMILY MEDICINE CLINIC | Age: 64
End: 2024-05-08

## 2024-05-08 VITALS
WEIGHT: 161 LBS | HEART RATE: 101 BPM | SYSTOLIC BLOOD PRESSURE: 136 MMHG | HEIGHT: 62 IN | BODY MASS INDEX: 29.63 KG/M2 | DIASTOLIC BLOOD PRESSURE: 84 MMHG | TEMPERATURE: 97.7 F | OXYGEN SATURATION: 98 %

## 2024-05-08 DIAGNOSIS — M81.0 AGE-RELATED OSTEOPOROSIS WITHOUT CURRENT PATHOLOGICAL FRACTURE: ICD-10-CM

## 2024-05-08 DIAGNOSIS — R60.0 LOWER EXTREMITY EDEMA: ICD-10-CM

## 2024-05-08 DIAGNOSIS — E03.9 HYPOTHYROIDISM, UNSPECIFIED TYPE: Primary | ICD-10-CM

## 2024-05-08 DIAGNOSIS — M06.9 RHEUMATOID ARTHRITIS, INVOLVING UNSPECIFIED SITE, UNSPECIFIED WHETHER RHEUMATOID FACTOR PRESENT: ICD-10-CM

## 2024-05-08 PROBLEM — K44.9 HIATAL HERNIA: Status: ACTIVE | Noted: 2024-05-08

## 2024-05-08 PROCEDURE — 99214 OFFICE O/P EST MOD 30 MIN: CPT | Performed by: NURSE PRACTITIONER

## 2024-05-08 PROCEDURE — 1159F MED LIST DOCD IN RCRD: CPT | Performed by: NURSE PRACTITIONER

## 2024-05-08 PROCEDURE — 1160F RVW MEDS BY RX/DR IN RCRD: CPT | Performed by: NURSE PRACTITIONER

## 2024-05-08 RX ORDER — ROMOSOZUMAB-AQQG 105 MG/1.17ML
INJECTION, SOLUTION SUBCUTANEOUS
COMMUNITY

## 2024-05-08 RX ORDER — HYDROCHLOROTHIAZIDE 12.5 MG/1
TABLET ORAL
Qty: 90 TABLET | Refills: 0 | Status: SHIPPED | OUTPATIENT
Start: 2024-05-08

## 2024-05-08 RX ORDER — PHENOL 1.4 %
600 AEROSOL, SPRAY (ML) MUCOUS MEMBRANE DAILY
COMMUNITY

## 2024-05-08 RX ORDER — LEVOTHYROXINE SODIUM 0.05 MG/1
50 TABLET ORAL DAILY
Qty: 90 TABLET | Refills: 1 | Status: SHIPPED | OUTPATIENT
Start: 2024-05-08

## 2024-06-11 ENCOUNTER — TELEPHONE (OUTPATIENT)
Dept: FAMILY MEDICINE CLINIC | Age: 64
End: 2024-06-11
Payer: COMMERCIAL

## 2024-06-14 RX ORDER — LEVOTHYROXINE SODIUM 0.07 MG/1
75 TABLET ORAL DAILY
Qty: 90 TABLET | Refills: 1 | Status: SHIPPED | OUTPATIENT
Start: 2024-06-14

## 2024-09-11 ENCOUNTER — OFFICE VISIT (OUTPATIENT)
Dept: OBSTETRICS AND GYNECOLOGY | Age: 64
End: 2024-09-11
Payer: MEDICARE

## 2024-09-11 ENCOUNTER — HOSPITAL ENCOUNTER (OUTPATIENT)
Facility: HOSPITAL | Age: 64
Discharge: HOME | End: 2024-09-11
Admitting: OBSTETRICS & GYNECOLOGY
Payer: MEDICARE

## 2024-09-11 VITALS
WEIGHT: 170 LBS | SYSTOLIC BLOOD PRESSURE: 138 MMHG | DIASTOLIC BLOOD PRESSURE: 84 MMHG | HEIGHT: 62 IN | BODY MASS INDEX: 31.28 KG/M2

## 2024-09-11 DIAGNOSIS — Z12.31 ENCOUNTER FOR SCREENING MAMMOGRAM FOR MALIGNANT NEOPLASM OF BREAST: ICD-10-CM

## 2024-09-11 DIAGNOSIS — Z79.890 HORMONE REPLACEMENT THERAPY (HRT): ICD-10-CM

## 2024-09-11 DIAGNOSIS — Z12.31 ENCOUNTER FOR SCREENING MAMMOGRAM FOR MALIGNANT NEOPLASM OF BREAST: Primary | ICD-10-CM

## 2024-09-11 PROCEDURE — 99214 OFFICE O/P EST MOD 30 MIN: CPT | Performed by: OBSTETRICS & GYNECOLOGY

## 2024-09-11 PROCEDURE — 77067 SCR MAMMO BI INCL CAD: CPT

## 2024-09-11 PROCEDURE — 77063 BREAST TOMOSYNTHESIS BI: CPT

## 2024-09-11 RX ORDER — ESTRADIOL 0.03 MG/D
1 PATCH TRANSDERMAL WEEKLY
Qty: 12 EACH | Refills: 3 | Status: SHIPPED | OUTPATIENT
Start: 2024-09-11

## 2024-09-11 NOTE — PROGRESS NOTES
"Subjective     Chief Complaint   Patient presents with    Gynecologic Exam     AE Today, Last AE 2023, Last pap 2018 (-), HPV (-), MG scheduled today, Dexa 2024, Colonoscopy 3/14/2022       Marguerite Abarca is a 64 y.o.  whose LMP is No LMP recorded (lmp unknown). Patient has had a hysterectomy. presents to follow-up regarding continuing hormone replacement therapy.  She does desire to continue and denies any issues with the patch.  She feels she would have hot flashes if she were to discontinue the patch.  They have been traveling quite a lot this year    Patient reports that she is not currently experiencing any symptoms of urinary incontinence.      The following portions of the patient's history were reviewed and updated as appropriate:vital signs, allergies, current medications, past medical history, past social history, past surgical history, and problem list      Objective      /84   Ht 157.5 cm (62\")   Wt 77.1 kg (170 lb)   LMP  (LMP Unknown)   BMI 31.09 kg/m²     Physical Exam  Well, no distress  Regular, nonlabored breathing  Normal bilateral breast exam without masses, skin changes, nipple findings.  No axillary lymphadenopathy  Abdomen is soft, nontender  Normal vulva without lesion, the vagina is atrophic and she has grade 1 cystocele but otherwise normal-appearing vagina, her uterus is surgically absent.  Normal bimanual exam without mass      Assessment & Plan     Diagnoses and all orders for this visit:    1. Encounter for screening mammogram for malignant neoplasm of breast (Primary)  -     Mammo Screening Digital Tomosynthesis Bilateral With CAD; Future    2. Hormone replacement therapy (HRT)    Other orders  -     estradiol (CLIMARA) 0.025 MG/24HR patch; Place 1 patch on the skin as directed by provider 1 (One) Time Per Week.  Dispense: 12 each; Refill: 3      She has not developed any contraindications to HRT and I sent her patch to the pharmacy    Follow up: 1  "  year(s)    Ynes Batista MD  9/11/2024

## 2024-11-13 ENCOUNTER — OFFICE VISIT (OUTPATIENT)
Dept: FAMILY MEDICINE CLINIC | Age: 64
End: 2024-11-13
Payer: MEDICARE

## 2024-11-13 VITALS
BODY MASS INDEX: 31.43 KG/M2 | WEIGHT: 170.8 LBS | OXYGEN SATURATION: 98 % | HEART RATE: 67 BPM | TEMPERATURE: 98.1 F | HEIGHT: 62 IN | SYSTOLIC BLOOD PRESSURE: 157 MMHG | DIASTOLIC BLOOD PRESSURE: 84 MMHG

## 2024-11-13 DIAGNOSIS — Z13.6 SCREENING FOR CARDIOVASCULAR CONDITION: ICD-10-CM

## 2024-11-13 DIAGNOSIS — E03.9 HYPOTHYROIDISM, UNSPECIFIED TYPE: ICD-10-CM

## 2024-11-13 DIAGNOSIS — Z00.00 MEDICARE ANNUAL WELLNESS VISIT, SUBSEQUENT: Primary | ICD-10-CM

## 2024-11-13 DIAGNOSIS — B00.1 COLD SORE: ICD-10-CM

## 2024-11-13 DIAGNOSIS — R60.0 LOWER EXTREMITY EDEMA: ICD-10-CM

## 2024-11-13 DIAGNOSIS — R03.0 ELEVATED BLOOD PRESSURE READING: ICD-10-CM

## 2024-11-13 RX ORDER — LEVOTHYROXINE SODIUM 75 UG/1
75 TABLET ORAL DAILY
Qty: 90 TABLET | Refills: 1 | Status: SHIPPED | OUTPATIENT
Start: 2024-11-13

## 2024-11-13 RX ORDER — HYDROCHLOROTHIAZIDE 12.5 MG/1
TABLET ORAL
Qty: 90 TABLET | Refills: 0 | Status: SHIPPED | OUTPATIENT
Start: 2024-11-13

## 2024-11-13 RX ORDER — VALACYCLOVIR HYDROCHLORIDE 500 MG/1
500 TABLET, FILM COATED ORAL 2 TIMES DAILY PRN
Qty: 60 TABLET | Refills: 0 | Status: SHIPPED | OUTPATIENT
Start: 2024-11-13

## 2024-11-13 NOTE — PROGRESS NOTES
Subjective   The ABCs of the Annual Wellness Visit  Medicare Wellness Visit      Marguerite Abarca is a 64 y.o. patient who presents for a Medicare Wellness Visit.    The following portions of the patient's history were reviewed and   updated as appropriate: allergies, current medications, past family history, past medical history, past social history, past surgical history, and problem list.    Compared to one year ago, the patient's physical   health is the same.  Compared to one year ago, the patient's mental   health is the same.    Recent Hospitalizations:  She was not admitted to the hospital during the last year.     Current Medical Providers:  Patient Care Team:  Mackenzie Ash APRN as PCP - General (Nurse Practitioner)  Chava Espinal MD as Consulting Physician (Rheumatology)  Ynes Batista MD as Consulting Physician (Obstetrics and Gynecology)    Outpatient Medications Prior to Visit   Medication Sig Dispense Refill    Calcium-Cholecalciferol-Zinc (VIACTIV CALCIUM IMMUNE PO) Take  by mouth.      Chlorcyclizine-Pseudoephed (Stahist AD) 25-60 MG tablet TAKE ONE TABLET BY MOUTH EVERY 8 HOURS AS NEEDED FOR CONGESTION 30 tablet 0    estradiol (CLIMARA) 0.025 MG/24HR patch Place 1 patch on the skin as directed by provider 1 (One) Time Per Week. 12 each 3    fluticasone (Flonase) 50 MCG/ACT nasal spray 2 sprays into the nostril(s) as directed by provider As Needed for Rhinitis.      folic acid (FOLVITE) 1 MG tablet Take 1 tablet by mouth Daily.      latanoprost (XALATAN) 0.005 % ophthalmic solution 1 drop Every Night.      methotrexate 2.5 MG tablet Take 6 tablets by mouth 1 (One) Time Per Week. Thursday TAKES 2.5 MG 6 TABS      nabumetone (RELAFEN) 750 MG tablet Take 1 tablet by mouth 2 (Two) Times a Day.      pantoprazole (PROTONIX) 40 MG EC tablet Take 1 tablet by mouth 2 (Two) Times a Day Before Meals. (Patient taking differently: Take 1 tablet by mouth Daily.) 60 tablet 2    Romosozumab-aqqg  (Evenity) 105 MG/1.17ML subcutaneous solution Inject  under the skin into the appropriate area as directed.      vitamin D (ERGOCALCIFEROL) 02008 UNITS capsule capsule Take 1 capsule by mouth 1 (One) Time Per Week. THURSDAY      hydroCHLOROthiazide 12.5 MG tablet Take 1 tablet by mouth daily as needed for leg swelling 90 tablet 0    levothyroxine (SYNTHROID, LEVOTHROID) 75 MCG tablet Take 1 tablet by mouth Daily. 90 tablet 1    valACYclovir (VALTREX) 500 MG tablet Take 1 tablet by mouth 2 (Two) Times a Day As Needed (cold sore). 60 tablet 0     No facility-administered medications prior to visit.     No opioid medication identified on active medication list. I have reviewed chart for other potential  high risk medication/s and harmful drug interactions in the elderly.      Aspirin is not on active medication list.  Aspirin use is not indicated based on review of current medical condition/s. Risk of harm outweighs potential benefits.  .    Patient Active Problem List   Diagnosis    GERD (gastroesophageal reflux disease)    Vitamin D deficiency    Uterine fibroid    Postmenopausal HRT (hormone replacement therapy)    Rheumatoid arthritis    Mixed stress and urge urinary incontinence    Cystocele with prolapse    Vaginal vault prolapse after hysterectomy    Radiculopathy, lumbar region    Hypothyroidism, unspecified    Diverticulosis    Herpes genitalis    Osteoporosis    Family history of colon cancer in father    History of hysterectomy    Age-related nuclear cataract of both eyes    Bilateral nonexudative age-related macular degeneration    Primary open angle glaucoma (POAG) of both eyes    Seasonal allergic rhinitis due to pollen    Epigastric pain    Hiatal hernia    Hormone replacement therapy (HRT)     Advance Care Planning Advance Directive is not on file.    ACP discussion was declined by the patient. Patient does not have an advance directive, declines further assistance.            Objective   Vitals:     "24 1132 24 1207 24 1208   BP: 157/94 154/80 157/84   BP Location: Left arm Left arm Right arm   Patient Position: Sitting Sitting Sitting   Cuff Size: Adult     Pulse: 60 65 67   Temp: 98.1 °F (36.7 °C)     TempSrc: Oral     SpO2: 98%     Weight: 77.5 kg (170 lb 12.8 oz)     Height: 157.5 cm (62.01\")     PainSc: 0-No pain         Estimated body mass index is 31.23 kg/m² as calculated from the following:    Height as of this encounter: 157.5 cm (62.01\").    Weight as of this encounter: 77.5 kg (170 lb 12.8 oz).    BMI is >= 30 and <35. (Class 1 Obesity). The following options were offered after discussion;: exercise counseling/recommendations and nutrition counseling/recommendations       Does the patient have evidence of cognitive impairment? No                                                                                                Health  Risk Assessment    Smoking Status:  Social History     Tobacco Use   Smoking Status Never    Passive exposure: Past   Smokeless Tobacco Never     Alcohol Consumption:  Social History     Substance and Sexual Activity   Alcohol Use Not Currently    Comment: socially       Fall Risk Screen  STEADI Fall Risk Assessment was completed, and patient is at LOW risk for falls.Assessment completed on:2024    Depression Screening   Little interest or pleasure in doing things? Not at all   Feeling down, depressed, or hopeless? Not at all   PHQ-2 Total Score 0      Health Habits and Functional and Cognitive Screenin/13/2024    11:36 AM   Functional & Cognitive Status   Do you have difficulty preparing food and eating? No   Do you have difficulty bathing yourself, getting dressed or grooming yourself? No   Do you have difficulty using the toilet? No   Do you have difficulty moving around from place to place? No   Do you have trouble with steps or getting out of a bed or a chair? No   Current Diet Well Balanced Diet   Dental Exam Up to date   Eye Exam Up " to date   Exercise (times per week) 2 times per week   Current Exercises Include Other        Exercise Comment chair yoga   Do you need help using the phone?  No   Are you deaf or do you have serious difficulty hearing?  No   Do you need help to go to places out of walking distance? No   Do you need help shopping? No   Do you need help preparing meals?  No   Do you need help with housework?  No   Do you need help with laundry? No   Do you need help taking your medications? No   Do you need help managing money? No   Do you ever drive or ride in a car without wearing a seat belt? No   Have you felt unusual stress, anger or loneliness in the last month? No   Who do you live with? Spouse   If you need help, do you have trouble finding someone available to you? No   Have you been bothered in the last four weeks by sexual problems? No   Do you have difficulty concentrating, remembering or making decisions? No           Age-appropriate Screening Schedule:  Refer to the list below for future screening recommendations based on patient's age, sex and/or medical conditions. Orders for these recommended tests are listed in the plan section. The patient has been provided with a written plan.    Health Maintenance List  Health Maintenance   Topic Date Due    BMI FOLLOWUP  09/07/2024    ZOSTER VACCINE (1 of 2) 11/13/2024 (Originally 9/1/2008)    COVID-19 Vaccine (1 - 2024-25 season) 11/15/2024 (Originally 9/1/2024)    INFLUENZA VACCINE  03/31/2025 (Originally 8/1/2024)    TDAP/TD VACCINES (1 - Tdap) 05/08/2025 (Originally [birth year]1977)    Pneumococcal Vaccine 65+ (2 of 2 - PCV) 11/13/2025 (Originally 9/1/2023)    MAMMOGRAM  09/11/2025    ANNUAL WELLNESS VISIT  11/13/2025    DXA SCAN  01/23/2026    COLORECTAL CANCER SCREENING  03/14/2027    HEPATITIS C SCREENING  Completed    PAP SMEAR  Discontinued                                                                                                                                             "    CMS Preventative Services Quick Reference  Risk Factors Identified During Encounter  Immunizations Discussed/Encouraged: Influenza, Prevnar 20 (Pneumococcal 20-valent conjugate), Shingrix, and COVID19    The above risks/problems have been discussed with the patient.  Pertinent information has been shared with the patient in the After Visit Summary.  An After Visit Summary and PPPS were made available to the patient.    Follow Up:   Next Medicare Wellness visit to be scheduled in 1 year.         Additional E&M Note during same encounter follows:  Patient has additional, significant, and separately identifiable condition(s)/problem(s) that require work above and beyond the Medicare Wellness Visit     Chief Complaint  Medicare Wellness-subsequent    Subjective   HPI  Marguerite is also being seen today for additional medical problem/s.  Marguerite is on levothyroxine 75 mcg daily for thyroid disease. Last TSH 6.86 6/18/24. Dose increased after that. She has appt scheduled with rheumatology in December and will have repeat labs at that time.   She is also on HCTZ 12.5 mg daily PRN. This has been beneficial for LE swelling.  Following with Dr Espinal (rheumatology) for RA. She is taking methotrexate.   She is also seeing rheumatology for osteoporosis. She has been started on evenity. She is also taking calcium and Vitamin D.               Objective   Vital Signs:  /84 (BP Location: Right arm, Patient Position: Sitting)   Pulse 67   Temp 98.1 °F (36.7 °C) (Oral)   Ht 157.5 cm (62.01\")   Wt 77.5 kg (170 lb 12.8 oz)   SpO2 98%   BMI 31.23 kg/m²     Physical Exam  Vitals reviewed.   Constitutional:       General: She is not in acute distress.     Appearance: Normal appearance. She is well-developed.   HENT:      Head: Normocephalic and atraumatic.   Cardiovascular:      Rate and Rhythm: Normal rate and regular rhythm.   Pulmonary:      Effort: Pulmonary effort is normal.      Breath sounds: Normal breath sounds. "   Neurological:      Mental Status: She is alert and oriented to person, place, and time.   Psychiatric:         Mood and Affect: Mood and affect normal.                       Assessment and Plan            Medicare annual wellness visit, subsequent  Appropriate screenings and vaccinations were reviewed with the pt and offered as indicated.  Pt counseled on healthy lifestyle including healthy diet, exercise.         Lower extremity edema  Medical condition is stable.  Continue same therapy.  Will recheck at next regular appointment    Orders:    hydroCHLOROthiazide 12.5 MG tablet; Take 1 tablet by mouth daily as needed for leg swelling    Cold sore  Medical condition is stable.  Continue same therapy.  Will recheck at next regular appointment    Orders:    valACYclovir (VALTREX) 500 MG tablet; Take 1 tablet by mouth 2 (Two) Times a Day As Needed (cold sore).    Hypothyroidism, unspecified type  Plan for lab recheck.   Orders:    TSH; Future    Screening for cardiovascular condition  Checking labs.  Orders:    Comprehensive Metabolic Panel; Future    Lipid Panel; Future    Elevated blood pressure reading  BP elevated today but was WNL at recent GYN appt. Will continue to monitor.                Follow Up   Return in about 6 months (around 5/13/2025) for Recheck.  Patient was given instructions and counseling regarding her condition or for health maintenance advice. Please see specific information pulled into the AVS if appropriate.

## 2024-12-04 ENCOUNTER — TELEPHONE (OUTPATIENT)
Dept: FAMILY MEDICINE CLINIC | Age: 64
End: 2024-12-04
Payer: MEDICARE

## 2024-12-04 NOTE — TELEPHONE ENCOUNTER
Spoke to pt regarding overdue 11/15/24 and 11/17/24 lab orders. Pt states she completed at [facility]. Records Requested.

## 2024-12-23 DIAGNOSIS — R60.0 LOWER EXTREMITY EDEMA: ICD-10-CM

## 2024-12-23 RX ORDER — HYDROCHLOROTHIAZIDE 12.5 MG/1
TABLET ORAL
Qty: 90 TABLET | Refills: 0 | OUTPATIENT
Start: 2024-12-23

## 2024-12-30 ENCOUNTER — TELEPHONE (OUTPATIENT)
Dept: FAMILY MEDICINE CLINIC | Age: 64
End: 2024-12-30
Payer: MEDICARE

## 2024-12-30 DIAGNOSIS — R60.0 LOWER EXTREMITY EDEMA: ICD-10-CM

## 2024-12-30 RX ORDER — HYDROCHLOROTHIAZIDE 12.5 MG/1
TABLET ORAL
Qty: 90 TABLET | Refills: 0 | Status: SHIPPED | OUTPATIENT
Start: 2024-12-30

## 2024-12-30 NOTE — TELEPHONE ENCOUNTER
Pt states she is needing a refill on the hctz sent in. She is having to change her ins and right now she can get it refilled for no cost. She is requesting rx be sent in asap. Please advise.

## 2025-04-08 RX ORDER — ESTRADIOL 0.04 MG/D
PATCH, EXTENDED RELEASE TRANSDERMAL
Qty: 48 PATCH | Refills: 2 | OUTPATIENT
Start: 2025-04-08

## 2025-04-08 RX ORDER — ESTRADIOL 0.03 MG/D
1 PATCH TRANSDERMAL WEEKLY
Qty: 12 EACH | Refills: 3 | Status: SHIPPED | OUTPATIENT
Start: 2025-04-08 | End: 2025-04-09

## 2025-04-09 ENCOUNTER — TELEPHONE (OUTPATIENT)
Dept: OBSTETRICS AND GYNECOLOGY | Age: 65
End: 2025-04-09
Payer: MEDICARE

## 2025-04-09 RX ORDER — ESTRADIOL 0.04 MG/D
1 PATCH TRANSDERMAL 2 TIMES WEEKLY
Qty: 8 EACH | Refills: 11 | Status: SHIPPED | OUTPATIENT
Start: 2025-04-10

## 2025-04-09 NOTE — TELEPHONE ENCOUNTER
Pt last annual on 9/11/24    Pt is currently taking estradiol patch 0.0375 strength. During last appt pt had mentioned that she is wanting to gradually stop taking but wanted to stay on the same strength the 0.0375. Pt had a refill in 9/11/24 but it was for the 0.025 and pt never picked them up due to the wrong strength. Pt is requesting a refill of the right one estradiol 0.0375. She mentioned that she usually gets 6 boxes but I informed her that I wasn't sure about how many. Pt did show me the box that did show 0.0375. Confirmed pharmacy. Please advise

## 2025-05-07 ENCOUNTER — TELEPHONE (OUTPATIENT)
Dept: FAMILY MEDICINE CLINIC | Age: 65
End: 2025-05-07

## 2025-05-07 ENCOUNTER — OFFICE VISIT (OUTPATIENT)
Dept: FAMILY MEDICINE CLINIC | Age: 65
End: 2025-05-07
Payer: MEDICARE

## 2025-05-07 VITALS
HEART RATE: 70 BPM | HEIGHT: 62 IN | WEIGHT: 170 LBS | OXYGEN SATURATION: 98 % | BODY MASS INDEX: 31.28 KG/M2 | TEMPERATURE: 98.2 F | SYSTOLIC BLOOD PRESSURE: 132 MMHG | DIASTOLIC BLOOD PRESSURE: 76 MMHG

## 2025-05-07 DIAGNOSIS — M06.9 RHEUMATOID ARTHRITIS, INVOLVING UNSPECIFIED SITE, UNSPECIFIED WHETHER RHEUMATOID FACTOR PRESENT: ICD-10-CM

## 2025-05-07 DIAGNOSIS — J32.9 SINUSITIS, UNSPECIFIED CHRONICITY, UNSPECIFIED LOCATION: ICD-10-CM

## 2025-05-07 DIAGNOSIS — E03.9 HYPOTHYROIDISM, UNSPECIFIED TYPE: Primary | ICD-10-CM

## 2025-05-07 DIAGNOSIS — M81.0 AGE-RELATED OSTEOPOROSIS WITHOUT CURRENT PATHOLOGICAL FRACTURE: ICD-10-CM

## 2025-05-07 DIAGNOSIS — R60.0 LOWER EXTREMITY EDEMA: ICD-10-CM

## 2025-05-07 RX ORDER — AZITHROMYCIN 250 MG/1
TABLET, FILM COATED ORAL
Qty: 6 TABLET | Refills: 0 | Status: SHIPPED | OUTPATIENT
Start: 2025-05-07

## 2025-05-07 RX ORDER — HYDROCHLOROTHIAZIDE 12.5 MG/1
TABLET ORAL
Qty: 90 TABLET | Refills: 1 | Status: SHIPPED | OUTPATIENT
Start: 2025-05-07

## 2025-05-07 RX ORDER — LEVOTHYROXINE SODIUM 75 UG/1
75 TABLET ORAL DAILY
Qty: 90 TABLET | Refills: 1 | Status: SHIPPED | OUTPATIENT
Start: 2025-05-07

## 2025-05-07 NOTE — PROGRESS NOTES
Chief Complaint  Marguerite Abarca presents to [facility] for Hypothyroidism    Subjective          History of Present Illness    Marugerite is here today for follow up thyroid disease. She is on levothyroxine 75 mcg daily. TSH 5.02 on 12/2/24.   She reports that she had lab work with rheumatology recently and has another appt upcoming as well.   She is also on HCTZ 12.5 mg daily PRN. This has been beneficial for LE swelling.  Following with Dr Espinal (rheumatology) for RA. She is taking methotrexate.   She is also seeing rheumatology for osteoporosis. She is on prolia. She is taking calcium and Vitamin D.   She notes sinus congestion and cough for 5 weeks. She has taken Stahist AD and mucinex. Also took some tessalon perles. Otherwise feeling well.   Reports BP at rheumatology office 130/80s.     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 Lesion in the right interlabial sulcus. Min symptoms, resolved in couple days.  No lymphadenopathy.  Probable recurrence of old infection, discussed IgG and IgM testing to determine if recent infection.    Osteoporosis     Rheumatoid arthritis     Dr Chava Espinal; Methotrexate,  Decreased relafen 12/1/2017.    Vitamin D deficiency      Current Outpatient Medications   Medication Sig Dispense Refill    Chlorcyclizine-Pseudoephed (Stahist AD) 25-60 MG tablet TAKE ONE TABLET BY MOUTH EVERY 8 HOURS AS NEEDED FOR CONGESTION 30 tablet 0    denosumab (PROLIA) 60 MG/ML solution prefilled syringe syringe Inject 1 mL under the skin into the appropriate area as directed Every 6 (Six) Months.      estradiol (Climara) 0.0375 MG/24HR Place 1 patch on the skin as directed by provider 2 (Two) Times a Week. 8  each 11    fluticasone (Flonase) 50 MCG/ACT nasal spray 2 sprays into the nostril(s) as directed by provider As Needed for Rhinitis.      folic acid (FOLVITE) 1 MG tablet Take 1 tablet by mouth Daily.      hydroCHLOROthiazide 12.5 MG tablet Take 1 tablet by mouth daily as needed for leg swelling 90 tablet 1    latanoprost (XALATAN) 0.005 % ophthalmic solution 1 drop Every Night.      levothyroxine (SYNTHROID, LEVOTHROID) 75 MCG tablet Take 1 tablet by mouth Daily. 90 tablet 1    methotrexate 2.5 MG tablet Take 6 tablets by mouth 1 (One) Time Per Week. Thursday TAKES 2.5 MG 6 TABS      nabumetone (RELAFEN) 750 MG tablet Take 1 tablet by mouth 2 (Two) Times a Day.      pantoprazole (PROTONIX) 40 MG EC tablet Take 1 tablet by mouth 2 (Two) Times a Day Before Meals. (Patient taking differently: Take 1 tablet by mouth Daily.) 60 tablet 2    valACYclovir (VALTREX) 500 MG tablet Take 1 tablet by mouth 2 (Two) Times a Day As Needed (cold sore). 60 tablet 0    vitamin D (ERGOCALCIFEROL) 54003 UNITS capsule capsule Take 1 capsule by mouth 1 (One) Time Per Week. THURSDAY      azithromycin (Zithromax Z-Mert) 250 MG tablet Take 2 tablets by mouth on day 1, then 1 tablet daily on days 2-5 6 tablet 0     No current facility-administered medications for this visit.     Past Surgical History:   Procedure Laterality Date    ANTERIOR AND POSTERIOR VAGINAL REPAIR N/A 8/7/2018    Procedure: POSTERIOR VAGINAL REPAIR MID URETHRAL SLING CYSTOSCOPY;  Surgeon: David Pelayo MD;  Location: [facility] OR;  Service: Gynecology    CHOLECYSTECTOMY WITH INTRAOPERATIVE CHOLANGIOGRAM N/A 2/16/2018    Procedure: CHOLECYSTECTOMY LAPAROSCOPIC ;  Surgeon: Silvano Rushing MD;  Location: [facility] [place] [facility] [place];  Service:     COLONOSCOPY N/A 8/25/2016    Diverticulosis in the sigmoid colon, The examination was otherwise normal on direct and retoflexion views    COLONOSCOPY N/A 05/07/2010    Diverticulosis sigmoid colon, otherwise normal-Dr. Arnold Mcneal     COLONOSCOPY N/A 05/04/2007    One 7 mm polyp in the distal sigmoid colon-Dr. Arnold Mcneal    COLONOSCOPY N/A 3/14/2022    Procedure: COLONOSCOPY TO CECUM;  Surgeon: Kay Miranda MD;  Location: [facility] ENDOSCOPY;  Service: General;  Laterality: N/A;  PRE- FAMILY HX COLON CANCER  POST- DIVERTICULOSIS    CYST REMOVAL Bilateral     Both wrists, benign.  Sounds like ganglion cyst.    DIAGNOSTIC LAPAROSCOPY  1983    Exploratory Laparoscopy with myomectomy (twice) and cautery of endometriosis. The ovaries were preserved.    ENDOSCOPY N/A 12/30/2017    Procedure: ESOPHAGOGASTRODUODENOSCOPY with biopsy; no H. pylori on biopsy.  Surgeon: Ignacio Rodriguez MD;  Location: [facility] ENDOSCOPY;  Service:     ENDOSCOPY N/A 1/29/2024    Procedure: ESOPHAGOGASTRODUODENOSCOPY WITH BIOPSY;  Surgeon: Kay Miranda MD;  Location: [facility] ENDOSCOPY;  Service: General;  Laterality: N/A;  EPIGASTRIC PAIN/3cm HIATAL HERNIA    EXCISION TUMOR NECK / THORAX  1987     Removal of Tumor from her back that was Benign, Lumbo-Sacral area.     HEMORRHOIDECTOMY N/A 05/18/2010    w/ fissurectomy-Dr. Arnold Mcneal    MYOMECTOMY  1995    Exploratory Laparoscopy with myomectomy (twice) and cautery of endometriosis. The ovaries were preserved.    SACROSPINUS SUSPENSION N/A 2/27/2020    Procedure: SACROSPINUS LIGAMENT SUSPENSION REPAIR CYSTOSCOPY;  Surgeon: David Pelayo MD;  Location: [facility] MAIN OR;  Service: Gynecology;  Laterality: N/A;    SINUS SURGERY      TOTAL ABDOMINAL HYSTERECTOMY WITH SALPINGO OOPHORECTOMY Bilateral 1999    Total Abdominal Hysterectomy with Removal of Both Tubes and Ovaries. for chronic pelvic pain and adhesions.    VOCAL CORD BIOPSY Right 07/31/2003    Microlaryngoscopy, biopsy of right true cord-Dr. Vamshi Barron      Social History     Tobacco Use    Smoking status: Never     Passive exposure: Past    Smokeless tobacco: Never   Vaping Use    Vaping status: Never Used   Substance Use Topics    Alcohol use: Not Currently  "    Comment: socially    Drug use: No     Family History   Problem Relation Age of Onset    Valvular heart disease Father          of CHF.    Colon cancer Father     Cancer Father         Colon    Colon polyps Sister     COPD Mother     No Known Problems Brother     No Known Problems Maternal Grandmother     No Known Problems Maternal Grandfather     No Known Problems Paternal Grandmother     No Known Problems Paternal Grandfather     No Known Problems Maternal Aunt         x3    No Known Problems Paternal Aunt         x3    Kidney disease Maternal Uncle     BRCA 1/2 Neg Hx     Breast cancer Neg Hx     Endometrial cancer Neg Hx     Ovarian cancer Neg Hx     Malig Hyperthermia Neg Hx     Uterine cancer Neg Hx      There are no preventive care reminders to display for this patient.   Immunization History   Administered Date(s) Administered    Flucelvax Quad Vial =>4yrs 10/01/2020    Fluzone (or Fluarix & Flulaval for VFC) >6mos 2022    Pneumococcal Polysaccharide (PPSV23) 2021        Objective     Vitals:    25 1355 25 1439   BP: 147/75 132/76   Pulse: 70    Temp: 98.2 °F (36.8 °C)    TempSrc: Oral    SpO2: 98%    Weight: 77.1 kg (170 lb)    Height: 157.5 cm (62.01\")      Body mass index is 31.08 kg/m².                No results found.    Physical Exam  Vitals reviewed.   Constitutional:       General: She is not in acute distress.     Appearance: Normal appearance. She is well-developed.   HENT:      Head: Normocephalic and atraumatic.      Right Ear: Tympanic membrane and ear canal normal.      Left Ear: Tympanic membrane and ear canal normal.      Nose: Congestion present.      Right Sinus: Maxillary sinus tenderness present.      Left Sinus: Maxillary sinus tenderness present.      Mouth/Throat:      Mouth: Mucous membranes are moist.      Comments: Uvula midline, PND  Eyes:      Extraocular Movements: Extraocular movements intact.      Pupils: Pupils are equal, round, and reactive to " light.   Cardiovascular:      Rate and Rhythm: Normal rate and regular rhythm.   Pulmonary:      Effort: Pulmonary effort is normal.      Breath sounds: Normal breath sounds.   Neurological:      Mental Status: She is alert and oriented to person, place, and time.   Psychiatric:         Mood and Affect: Mood and affect normal.           Result Review :                               Assessment and Plan      Assessment & Plan  Hypothyroidism, unspecified type  Medical condition is stable.  Continue same therapy.  Will recheck at next regular appointment    Orders:    levothyroxine (SYNTHROID, LEVOTHROID) 75 MCG tablet; Take 1 tablet by mouth Daily.    Lower extremity edema  Medical condition is stable.  Continue same therapy.  Will recheck at next regular appointment    Orders:    hydroCHLOROthiazide 12.5 MG tablet; Take 1 tablet by mouth daily as needed for leg swelling    Sinusitis, unspecified chronicity, unspecified location  Rx azithromycin sent. Increase fluids.   Orders:    azithromycin (Zithromax Z-Mert) 250 MG tablet; Take 2 tablets by mouth on day 1, then 1 tablet daily on days 2-5    Rheumatoid arthritis, involving unspecified site, unspecified whether rheumatoid factor present  Continue follow up with rheumatology       Age-related osteoporosis without current pathological fracture  Continue follow up rheumatology. Continue calcium and Vitamin D.                  Follow Up     Return in about 6 months (around 11/7/2025).

## 2025-05-08 ENCOUNTER — RESULTS FOLLOW-UP (OUTPATIENT)
Dept: FAMILY MEDICINE CLINIC | Age: 65
End: 2025-05-08
Payer: MEDICARE

## 2025-05-08 LAB
ALT SERPL W P-5'-P-CCNC: 24 U/L (ref 1–33)
AST SERPL-CCNC: 21 U/L (ref 1–32)
CREATININE: 0.92
HCT VFR BLD AUTO: 37.7 % (ref 34–46.6)
HGB BLD-MCNC: 12.5 G/DL (ref 12–15.9)

## 2025-06-05 ENCOUNTER — TELEPHONE (OUTPATIENT)
Dept: FAMILY MEDICINE CLINIC | Age: 65
End: 2025-06-05
Payer: MEDICARE

## 2025-06-05 NOTE — TELEPHONE ENCOUNTER
Pt came into office and stated that [facility] owes her a refund because she has paid her deductible twice. She stated that she paid it with her arthritis dr and she paid it here. I attempted to give her the billing dept number but she told me that she was not calling them because she doesn't trust them and has already had issues with them before. She believes that she paid the deductible with or office on 5/30 but she is going to go check her documents then call us back to let us know for sure.

## 2025-06-05 NOTE — TELEPHONE ENCOUNTER
Pt called her Medicare and she stated that they told her that the 119.17 that was paid on 5/30/2025 should have not been collected. Please advise the pt on what she will need to do to collect that refund or how that all works.

## 2025-08-12 ENCOUNTER — OFFICE VISIT (OUTPATIENT)
Dept: FAMILY MEDICINE CLINIC | Age: 65
End: 2025-08-12
Payer: MEDICARE

## 2025-08-12 VITALS
HEIGHT: 62 IN | WEIGHT: 168.6 LBS | TEMPERATURE: 97.6 F | OXYGEN SATURATION: 97 % | HEART RATE: 79 BPM | BODY MASS INDEX: 31.03 KG/M2 | SYSTOLIC BLOOD PRESSURE: 149 MMHG | DIASTOLIC BLOOD PRESSURE: 85 MMHG

## 2025-08-12 DIAGNOSIS — M54.31 RIGHT SCIATIC NERVE PAIN: Primary | ICD-10-CM

## 2025-08-12 DIAGNOSIS — R03.0 ELEVATED BLOOD PRESSURE READING: ICD-10-CM

## 2025-08-12 RX ORDER — METHYLPREDNISOLONE 4 MG/1
TABLET ORAL
Qty: 21 TABLET | Refills: 0 | Status: SHIPPED | OUTPATIENT
Start: 2025-08-12

## 2025-08-12 RX ORDER — ACETAMINOPHEN 160 MG
2000 TABLET,DISINTEGRATING ORAL DAILY
COMMUNITY

## (undated) DEVICE — PREMIUM WET SKIN PREP TRAY: Brand: MEDLINE INDUSTRIES, INC.

## (undated) DEVICE — METER,URINE,400ML,DRAIN BAG,L/F,LL,SLIDE: Brand: MEDLINE

## (undated) DEVICE — ENDOPATH XCEL BLADELESS TROCARS WITH STABILITY SLEEVES: Brand: ENDOPATH XCEL

## (undated) DEVICE — SOL NS 500ML

## (undated) DEVICE — INTENDED FOR TISSUE SEPARATION, AND OTHER PROCEDURES THAT REQUIRE A SHARP SURGICAL BLADE TO PUNCTURE OR CUT.: Brand: BARD-PARKER ® CARBON RIB-BACK BLADES

## (undated) DEVICE — COVER,MAYO STAND,STERILE: Brand: MEDLINE

## (undated) DEVICE — NDL SPINE 18G 31/2IN PNK

## (undated) DEVICE — CANNULA,ADULT,SOFT-TOUCH,7'TUBE,UC: Brand: PENDING

## (undated) DEVICE — BITEBLOCK OMNI BLOC

## (undated) DEVICE — SKIN PREP TRAY W/CHG: Brand: MEDLINE INDUSTRIES, INC.

## (undated) DEVICE — INTENT TO BE USED WITH SUTURE MATERIAL FOR TISSUE CLOSURE: Brand: RICHARD-ALLAN® NEEDLE 1/2 CIRCLE TAPER

## (undated) DEVICE — ADAPT CLN BIOGUARD AIR/H2O DISP

## (undated) DEVICE — FRCP BX RADJAW4 NDL 2.8 240CM LG OG BX40

## (undated) DEVICE — ENDOPOUCH RETRIEVER SPECIMEN RETRIEVAL BAGS: Brand: ENDOPOUCH RETRIEVER

## (undated) DEVICE — Device: Brand: DEFENDO AIR/WATER/SUCTION AND BIOPSY VALVE

## (undated) DEVICE — PK HYST VAG 40

## (undated) DEVICE — SUT VIC 0 CT1 36IN J946H

## (undated) DEVICE — ENDOPATH PNEUMONEEDLE INSUFFLATION NEEDLES WITH LUER LOCK CONNECTORS 120MM: Brand: ENDOPATH

## (undated) DEVICE — DRAPE,REIN 53X77,STERILE: Brand: MEDLINE

## (undated) DEVICE — SYR LUERLOK 20CC

## (undated) DEVICE — NEEDLE, QUINCKE, 18GX3.5": Brand: MEDLINE

## (undated) DEVICE — Device

## (undated) DEVICE — STRAP STIRUP SLP RNG 19X3.5IN DISP

## (undated) DEVICE — ENCORE® LATEX ORTHO SIZE 7.5, STERILE LATEX POWDER-FREE SURGICAL GLOVE: Brand: ENCORE

## (undated) DEVICE — SENSR O2 OXIMAX FNGR A/ 18IN NONSTR

## (undated) DEVICE — BLCK/BITE BLOX W/DENTL/RIM W/STRAP 54F

## (undated) DEVICE — ANTIBACTERIAL UNDYED BRAIDED (POLYGLACTIN 910), SYNTHETIC ABSORBABLE SUTURE: Brand: COATED VICRYL

## (undated) DEVICE — KT ORCA ORCAPOD DISP STRL

## (undated) DEVICE — SUT VIC 1 CT1 36IN J947H

## (undated) DEVICE — DEV CAPTURE SUT CAPIO/SLIM 11MM 25CM

## (undated) DEVICE — DECANT BG O JET

## (undated) DEVICE — PK LAP CHOLE BG

## (undated) DEVICE — DRAPE,UNDERBUTTOCKS,PCH,STERILE: Brand: MEDLINE

## (undated) DEVICE — SUT VIC 0 TN 27IN DYED JTN0G

## (undated) DEVICE — SYR LUERLOK 50ML

## (undated) DEVICE — TUBING, SUCTION, 1/4" X 10', STRAIGHT: Brand: MEDLINE

## (undated) DEVICE — CANN O2 ETCO2 FITS ALL CONN CO2 SMPL A/ 7IN DISP LF

## (undated) DEVICE — GOWN,SIRUS,NON REINFRCD,LARGE,SET IN SL: Brand: MEDLINE

## (undated) DEVICE — GOWN,SLEEVE,STERILE,W/CSR WRAP,1/P: Brand: MEDLINE

## (undated) DEVICE — LN SMPL CO2 SHTRM SD STREAM W/M LUER

## (undated) DEVICE — GLV SURG SIGNATURE ESSENTIAL PF LTX SZ8

## (undated) DEVICE — ENCORE® LATEX ORTHO SIZE 8, STERILE LATEX POWDER-FREE SURGICAL GLOVE: Brand: ENCORE

## (undated) DEVICE — SUT VIC 5/0 PS2 18IN J495H

## (undated) DEVICE — TBG 02 CRUSH RESIST LF CLR 7FT

## (undated) DEVICE — CATH FOL SIMPLYLATEX SILELAST 16F 17IN

## (undated) DEVICE — SUT VIC 2/0 CT2 27IN J269H

## (undated) DEVICE — TOTAL TRAY, 16FR 10ML SIL FOLEY, URN: Brand: MEDLINE

## (undated) DEVICE — SUT MNCRYL PLS ANTIB UD 4/0 PS2 18IN

## (undated) DEVICE — ADHS SKIN DERMABOND TOP ADVANCED

## (undated) DEVICE — ST IRR CYSTO W/SPK 77IN LF